# Patient Record
Sex: MALE | Race: BLACK OR AFRICAN AMERICAN | NOT HISPANIC OR LATINO | Employment: OTHER | URBAN - METROPOLITAN AREA
[De-identification: names, ages, dates, MRNs, and addresses within clinical notes are randomized per-mention and may not be internally consistent; named-entity substitution may affect disease eponyms.]

---

## 2019-11-12 ENCOUNTER — HOSPITAL ENCOUNTER (OUTPATIENT)
Dept: GENERAL RADIOLOGY | Facility: HOSPITAL | Age: 69
Discharge: HOME | End: 2019-11-12
Admitting: UROLOGY

## 2019-11-12 ENCOUNTER — APPOINTMENT (OUTPATIENT)
Dept: PREADMISSION TESTING | Facility: HOSPITAL | Age: 69
End: 2019-11-12

## 2019-11-12 VITALS
WEIGHT: 230.25 LBS | RESPIRATION RATE: 16 BRPM | SYSTOLIC BLOOD PRESSURE: 120 MMHG | TEMPERATURE: 97.7 F | OXYGEN SATURATION: 98 % | HEART RATE: 60 BPM | BODY MASS INDEX: 28.63 KG/M2 | HEIGHT: 75 IN | DIASTOLIC BLOOD PRESSURE: 73 MMHG

## 2019-11-12 LAB
ANION GAP SERPL CALCULATED.3IONS-SCNC: 11.8 MMOL/L (ref 5–15)
BUN BLD-MCNC: 16 MG/DL (ref 8–23)
BUN/CREAT SERPL: 9.4 (ref 7–25)
CALCIUM SPEC-SCNC: 9.6 MG/DL (ref 8.6–10.5)
CHLORIDE SERPL-SCNC: 97 MMOL/L (ref 98–107)
CO2 SERPL-SCNC: 24.2 MMOL/L (ref 22–29)
CREAT BLD-MCNC: 1.7 MG/DL (ref 0.76–1.27)
DEPRECATED RDW RBC AUTO: 42.1 FL (ref 37–54)
ERYTHROCYTE [DISTWIDTH] IN BLOOD BY AUTOMATED COUNT: 13 % (ref 12.3–15.4)
GFR SERPL CREATININE-BSD FRML MDRD: 40 ML/MIN/1.73
GLUCOSE BLD-MCNC: 190 MG/DL (ref 65–99)
HCT VFR BLD AUTO: 37.5 % (ref 37.5–51)
HGB BLD-MCNC: 12.8 G/DL (ref 13–17.7)
MCH RBC QN AUTO: 30.2 PG (ref 26.6–33)
MCHC RBC AUTO-ENTMCNC: 34.1 G/DL (ref 31.5–35.7)
MCV RBC AUTO: 88.4 FL (ref 79–97)
PLATELET # BLD AUTO: 209 10*3/MM3 (ref 140–450)
PMV BLD AUTO: 11.2 FL (ref 6–12)
POTASSIUM BLD-SCNC: 4 MMOL/L (ref 3.5–5.2)
RBC # BLD AUTO: 4.24 10*6/MM3 (ref 4.14–5.8)
SODIUM BLD-SCNC: 133 MMOL/L (ref 136–145)
WBC NRBC COR # BLD: 7.63 10*3/MM3 (ref 3.4–10.8)

## 2019-11-12 PROCEDURE — 93005 ELECTROCARDIOGRAM TRACING: CPT

## 2019-11-12 PROCEDURE — 93010 ELECTROCARDIOGRAM REPORT: CPT | Performed by: INTERNAL MEDICINE

## 2019-11-12 PROCEDURE — 80048 BASIC METABOLIC PNL TOTAL CA: CPT | Performed by: UROLOGY

## 2019-11-12 PROCEDURE — 36415 COLL VENOUS BLD VENIPUNCTURE: CPT

## 2019-11-12 PROCEDURE — 85027 COMPLETE CBC AUTOMATED: CPT | Performed by: UROLOGY

## 2019-11-12 PROCEDURE — 71046 X-RAY EXAM CHEST 2 VIEWS: CPT

## 2019-11-12 RX ORDER — TIZANIDINE 4 MG/1
4 TABLET ORAL NIGHTLY
COMMUNITY

## 2019-11-12 RX ORDER — AMLODIPINE BESYLATE 10 MG/1
10 TABLET ORAL EVERY MORNING
COMMUNITY

## 2019-11-12 RX ORDER — GLIPIZIDE 10 MG/1
10 TABLET ORAL
COMMUNITY
End: 2019-11-22 | Stop reason: HOSPADM

## 2019-11-12 RX ORDER — HYDROCODONE BITARTRATE AND ACETAMINOPHEN 10; 325 MG/1; MG/1
1 TABLET ORAL EVERY 4 HOURS PRN
COMMUNITY
End: 2023-03-31 | Stop reason: HOSPADM

## 2019-11-12 RX ORDER — TRAZODONE HYDROCHLORIDE 100 MG/1
100 TABLET ORAL NIGHTLY
COMMUNITY

## 2019-11-12 RX ORDER — INSULIN GLARGINE 100 [IU]/ML
30 INJECTION, SOLUTION SUBCUTANEOUS DAILY
COMMUNITY
End: 2019-11-22 | Stop reason: HOSPADM

## 2019-11-12 RX ORDER — OMEPRAZOLE 40 MG/1
40 CAPSULE, DELAYED RELEASE ORAL NIGHTLY
COMMUNITY

## 2019-11-12 RX ORDER — ALLOPURINOL 100 MG/1
100 TABLET ORAL EVERY EVENING
COMMUNITY

## 2019-11-12 RX ORDER — SIMVASTATIN 20 MG
20 TABLET ORAL
COMMUNITY
End: 2021-01-12

## 2019-11-12 RX ORDER — LOSARTAN POTASSIUM AND HYDROCHLOROTHIAZIDE 25; 100 MG/1; MG/1
1 TABLET ORAL DAILY
COMMUNITY
End: 2019-11-22 | Stop reason: HOSPADM

## 2019-11-12 RX ORDER — NEBIVOLOL 10 MG/1
10 TABLET ORAL EVERY MORNING
COMMUNITY

## 2019-11-12 NOTE — DISCHARGE INSTRUCTIONS
Take the following medications the morning of surgery with a small sip of water:    TAKE OMEPRAZOLE, AMLODIPINE, LOSARTAN, BYSTOLIC             MAY TAKE PAIN PILL IF NEEDED    General Instructions:  • Do not eat or drink anything after midnight the night before surgery. PER ORDER OF DOCTOR  • Patients who avoid  alcohol for 4 weeks prior to surgery have a reduced risk of post-operative complications.    • Do not  drink alcohol the day of surgery.   • Bring any papers given to you in the doctor’s office.  • Wear clean comfortable clothes.  • Remove all piercings.  Leave jewelry and any other valuables at home.  • The Pre-Admission Testing nurse will instruct you to bring medications if unable to obtain an accurate list in Pre-Admission Testing.   • REPORT TO MAIN SURGERY  ON 11-19-19 AT 1 PM           Preventing a Surgical Site Infection:  • For 2 to 3 days before surgery, avoid shaving with a razor because the razor can irritate skin and make it easier to develop an infection.    • Any areas of open skin can increase the risk of a post-operative wound infection by allowing bacteria to enter and travel throughout the body.  Notify your surgeon if you have any skin wounds / rashes even if it is not near the expected surgical site.  The area will need assessed to determine if surgery should be delayed until it is healed.  • The night prior to surgery sleep in a clean bed with clean clothing.  Do not allow pets to sleep with you.  • Shower on the morning of surgery using a fresh bar of anti-bacterial soap (such as Dial) and clean washcloth.  Dry with a clean towel and dress in clean clothing.  • Ask your surgeon if you will be receiving antibiotics prior to surgery.  • Make sure you, your family, and all healthcare providers clean their hands with soap and water or an alcohol based hand  before caring for you or your wound.    Day of surgery:  Your arrival time is approximately two hours before your scheduled  surgery time.  Upon arrival, a Pre-op nurse and Anesthesiologist will review your health history, obtain vital signs, and answer questions you may have.  The only belongings needed at this time will be your home medications and if applicable your C-PAP/BI-PAP machine.  If you are staying overnight your family can leave the rest of your belongings in the car and bring them to your room later.  A Pre-op nurse will start an IV and you may receive medication in preparation for surgery, including something to help you relax.  Your family will be able to see you in the Pre-op area.  Two visitors at a time will be allowed in the Pre-op room.  While you are in surgery your family should notify the waiting room  if they leave the waiting room area and provide a contact phone number.    Please be aware that surgery does come with discomfort.  We want to make every effort to control your discomfort so please discuss any uncontrolled symptoms with your nurse.   Your doctor will most likely have prescribed pain medications.          If you are staying overnight following surgery, you will be transported to your hospital room following the recovery period.  [facility] has all private rooms.    You have received a list of surgical assistants for your reference.  If you have any questions please call Pre-Admission Testing at [phone].  Deductibles and co-payments are collected on the day of service. Please be prepared to pay the required co-pay, deductible or deposit on the day of service as defined by your plan.

## 2019-11-19 ENCOUNTER — ANESTHESIA (OUTPATIENT)
Dept: PERIOP | Facility: HOSPITAL | Age: 69
End: 2019-11-19

## 2019-11-19 ENCOUNTER — HOSPITAL ENCOUNTER (INPATIENT)
Facility: HOSPITAL | Age: 69
LOS: 3 days | Discharge: HOME | End: 2019-11-22
Attending: UROLOGY | Admitting: UROLOGY

## 2019-11-19 ENCOUNTER — ANESTHESIA EVENT (OUTPATIENT)
Dept: PERIOP | Facility: HOSPITAL | Age: 69
End: 2019-11-19

## 2019-11-19 DIAGNOSIS — N28.89 LEFT RENAL MASS: Primary | ICD-10-CM

## 2019-11-19 LAB
ABO GROUP BLD: NORMAL
BLD GP AB SCN SERPL QL: NEGATIVE
GLUCOSE BLDC GLUCOMTR-MCNC: 107 MG/DL (ref 70–130)
GLUCOSE BLDC GLUCOMTR-MCNC: 129 MG/DL (ref 70–130)
RH BLD: POSITIVE
T&S EXPIRATION DATE: NORMAL

## 2019-11-19 PROCEDURE — 86901 BLOOD TYPING SEROLOGIC RH(D): CPT | Performed by: UROLOGY

## 2019-11-19 PROCEDURE — 25010000003 CEFAZOLIN IN DEXTROSE 2-4 GM/100ML-% SOLUTION: Performed by: UROLOGY

## 2019-11-19 PROCEDURE — 25010000002 FENTANYL CITRATE (PF) 100 MCG/2ML SOLUTION: Performed by: NURSE ANESTHETIST, CERTIFIED REGISTERED

## 2019-11-19 PROCEDURE — 88342 IMHCHEM/IMCYTCHM 1ST ANTB: CPT | Performed by: UROLOGY

## 2019-11-19 PROCEDURE — 25010000002 PROPOFOL 10 MG/ML EMULSION: Performed by: NURSE ANESTHETIST, CERTIFIED REGISTERED

## 2019-11-19 PROCEDURE — 86920 COMPATIBILITY TEST SPIN: CPT

## 2019-11-19 PROCEDURE — 25010000002 HYDROMORPHONE PER 4 MG: Performed by: NURSE ANESTHETIST, CERTIFIED REGISTERED

## 2019-11-19 PROCEDURE — 0TB10ZZ EXCISION OF LEFT KIDNEY, OPEN APPROACH: ICD-10-PCS | Performed by: UROLOGY

## 2019-11-19 PROCEDURE — 25010000002 MIDAZOLAM PER 1 MG: Performed by: ANESTHESIOLOGY

## 2019-11-19 PROCEDURE — 86900 BLOOD TYPING SEROLOGIC ABO: CPT | Performed by: UROLOGY

## 2019-11-19 PROCEDURE — 88341 IMHCHEM/IMCYTCHM EA ADD ANTB: CPT | Performed by: UROLOGY

## 2019-11-19 PROCEDURE — 82962 GLUCOSE BLOOD TEST: CPT

## 2019-11-19 PROCEDURE — 25010000002 FUROSEMIDE PER 20 MG: Performed by: NURSE ANESTHETIST, CERTIFIED REGISTERED

## 2019-11-19 PROCEDURE — 25010000002 ONDANSETRON PER 1 MG: Performed by: UROLOGY

## 2019-11-19 PROCEDURE — 25010000002 HYDROMORPHONE 1 MG/ML SOLUTION: Performed by: UROLOGY

## 2019-11-19 PROCEDURE — 25010000002 MANNITOL PER 50 ML: Performed by: NURSE ANESTHETIST, CERTIFIED REGISTERED

## 2019-11-19 PROCEDURE — 86850 RBC ANTIBODY SCREEN: CPT | Performed by: UROLOGY

## 2019-11-19 PROCEDURE — 25010000002 SUCCINYLCHOLINE PER 20 MG: Performed by: NURSE ANESTHETIST, CERTIFIED REGISTERED

## 2019-11-19 PROCEDURE — 88341 IMHCHEM/IMCYTCHM EA ADD ANTB: CPT

## 2019-11-19 PROCEDURE — 88307 TISSUE EXAM BY PATHOLOGIST: CPT | Performed by: UROLOGY

## 2019-11-19 DEVICE — CLIP LIG HEMOLOK PA LG 6CT PRP: Type: IMPLANTABLE DEVICE | Site: KIDNEY | Status: FUNCTIONAL

## 2019-11-19 DEVICE — CLIP LIGAT VASC HORIZON TI LG ORNG 6CT: Type: IMPLANTABLE DEVICE | Site: KIDNEY | Status: FUNCTIONAL

## 2019-11-19 DEVICE — CLIP LIGAT VASC HORIZON TI MD/LG GRN 6CT: Type: IMPLANTABLE DEVICE | Site: KIDNEY | Status: FUNCTIONAL

## 2019-11-19 RX ORDER — OXYCODONE AND ACETAMINOPHEN 7.5; 325 MG/1; MG/1
1 TABLET ORAL ONCE AS NEEDED
Status: COMPLETED | OUTPATIENT
Start: 2019-11-19 | End: 2019-11-19

## 2019-11-19 RX ORDER — MANNITOL 250 MG/ML
INJECTION, SOLUTION INTRAVENOUS AS NEEDED
Status: DISCONTINUED | OUTPATIENT
Start: 2019-11-19 | End: 2019-11-19 | Stop reason: SURG

## 2019-11-19 RX ORDER — HYDROCODONE BITARTRATE AND ACETAMINOPHEN 10; 325 MG/1; MG/1
1 TABLET ORAL EVERY 4 HOURS PRN
Status: DISCONTINUED | OUTPATIENT
Start: 2019-11-19 | End: 2019-11-21

## 2019-11-19 RX ORDER — CEFAZOLIN SODIUM 2 G/100ML
2 INJECTION, SOLUTION INTRAVENOUS
Status: COMPLETED | OUTPATIENT
Start: 2019-11-19 | End: 2019-11-19

## 2019-11-19 RX ORDER — DIPHENHYDRAMINE HCL 25 MG
25 CAPSULE ORAL
Status: DISCONTINUED | OUTPATIENT
Start: 2019-11-19 | End: 2019-11-19 | Stop reason: HOSPADM

## 2019-11-19 RX ORDER — ASPIRIN 81 MG/1
81 TABLET, CHEWABLE ORAL DAILY
COMMUNITY
End: 2019-11-22 | Stop reason: HOSPADM

## 2019-11-19 RX ORDER — ONDANSETRON 2 MG/ML
4 INJECTION INTRAMUSCULAR; INTRAVENOUS ONCE AS NEEDED
Status: DISCONTINUED | OUTPATIENT
Start: 2019-11-19 | End: 2019-11-19 | Stop reason: HOSPADM

## 2019-11-19 RX ORDER — SUCCINYLCHOLINE CHLORIDE 20 MG/ML
INJECTION INTRAMUSCULAR; INTRAVENOUS AS NEEDED
Status: DISCONTINUED | OUTPATIENT
Start: 2019-11-19 | End: 2019-11-19 | Stop reason: SURG

## 2019-11-19 RX ORDER — ALLOPURINOL 100 MG/1
100 TABLET ORAL NIGHTLY
Status: DISCONTINUED | OUTPATIENT
Start: 2019-11-19 | End: 2019-11-22 | Stop reason: HOSPADM

## 2019-11-19 RX ORDER — NEBIVOLOL 10 MG/1
10 TABLET ORAL DAILY
Status: DISCONTINUED | OUTPATIENT
Start: 2019-11-20 | End: 2019-11-22 | Stop reason: HOSPADM

## 2019-11-19 RX ORDER — SENNA AND DOCUSATE SODIUM 50; 8.6 MG/1; MG/1
2 TABLET, FILM COATED ORAL 2 TIMES DAILY
Status: DISCONTINUED | OUTPATIENT
Start: 2019-11-19 | End: 2019-11-22 | Stop reason: HOSPADM

## 2019-11-19 RX ORDER — CEFAZOLIN SODIUM 2 G/100ML
2 INJECTION, SOLUTION INTRAVENOUS EVERY 8 HOURS
Status: COMPLETED | OUTPATIENT
Start: 2019-11-19 | End: 2019-11-20

## 2019-11-19 RX ORDER — ONDANSETRON 4 MG/1
4 TABLET, FILM COATED ORAL EVERY 6 HOURS PRN
Status: DISCONTINUED | OUTPATIENT
Start: 2019-11-19 | End: 2019-11-22 | Stop reason: HOSPADM

## 2019-11-19 RX ORDER — LIDOCAINE HYDROCHLORIDE 10 MG/ML
0.5 INJECTION, SOLUTION EPIDURAL; INFILTRATION; INTRACAUDAL; PERINEURAL ONCE AS NEEDED
Status: DISCONTINUED | OUTPATIENT
Start: 2019-11-19 | End: 2019-11-19 | Stop reason: HOSPADM

## 2019-11-19 RX ORDER — HYDROMORPHONE HYDROCHLORIDE 1 MG/ML
0.5 INJECTION, SOLUTION INTRAMUSCULAR; INTRAVENOUS; SUBCUTANEOUS
Status: DISCONTINUED | OUTPATIENT
Start: 2019-11-19 | End: 2019-11-19 | Stop reason: HOSPADM

## 2019-11-19 RX ORDER — ACETAMINOPHEN 325 MG/1
650 TABLET ORAL ONCE AS NEEDED
Status: DISCONTINUED | OUTPATIENT
Start: 2019-11-19 | End: 2019-11-19 | Stop reason: HOSPADM

## 2019-11-19 RX ORDER — PANTOPRAZOLE SODIUM 40 MG/1
40 TABLET, DELAYED RELEASE ORAL
Status: DISCONTINUED | OUTPATIENT
Start: 2019-11-20 | End: 2019-11-22 | Stop reason: HOSPADM

## 2019-11-19 RX ORDER — SODIUM CHLORIDE 0.9 % (FLUSH) 0.9 %
3 SYRINGE (ML) INJECTION EVERY 12 HOURS SCHEDULED
Status: DISCONTINUED | OUTPATIENT
Start: 2019-11-19 | End: 2019-11-19 | Stop reason: HOSPADM

## 2019-11-19 RX ORDER — MIDAZOLAM HYDROCHLORIDE 1 MG/ML
0.5 INJECTION INTRAMUSCULAR; INTRAVENOUS
Status: DISCONTINUED | OUTPATIENT
Start: 2019-11-19 | End: 2019-11-19 | Stop reason: HOSPADM

## 2019-11-19 RX ORDER — NALOXONE HCL 0.4 MG/ML
0.1 VIAL (ML) INJECTION
Status: DISCONTINUED | OUTPATIENT
Start: 2019-11-19 | End: 2019-11-22 | Stop reason: HOSPADM

## 2019-11-19 RX ORDER — LIDOCAINE HYDROCHLORIDE 40 MG/ML
SOLUTION TOPICAL AS NEEDED
Status: DISCONTINUED | OUTPATIENT
Start: 2019-11-19 | End: 2019-11-19 | Stop reason: SURG

## 2019-11-19 RX ORDER — ATORVASTATIN CALCIUM 10 MG/1
10 TABLET, FILM COATED ORAL DAILY
Status: DISCONTINUED | OUTPATIENT
Start: 2019-11-20 | End: 2019-11-22 | Stop reason: HOSPADM

## 2019-11-19 RX ORDER — FAMOTIDINE 10 MG/ML
20 INJECTION, SOLUTION INTRAVENOUS ONCE
Status: COMPLETED | OUTPATIENT
Start: 2019-11-19 | End: 2019-11-19

## 2019-11-19 RX ORDER — HYDROCODONE BITARTRATE AND ACETAMINOPHEN 7.5; 325 MG/1; MG/1
1 TABLET ORAL EVERY 4 HOURS PRN
Status: DISCONTINUED | OUTPATIENT
Start: 2019-11-19 | End: 2019-11-22 | Stop reason: HOSPADM

## 2019-11-19 RX ORDER — PROPOFOL 10 MG/ML
VIAL (ML) INTRAVENOUS AS NEEDED
Status: DISCONTINUED | OUTPATIENT
Start: 2019-11-19 | End: 2019-11-19 | Stop reason: SURG

## 2019-11-19 RX ORDER — EPHEDRINE SULFATE 50 MG/ML
INJECTION, SOLUTION INTRAVENOUS AS NEEDED
Status: DISCONTINUED | OUTPATIENT
Start: 2019-11-19 | End: 2019-11-19 | Stop reason: SURG

## 2019-11-19 RX ORDER — HYDROCODONE BITARTRATE AND ACETAMINOPHEN 7.5; 325 MG/1; MG/1
1 TABLET ORAL ONCE AS NEEDED
Status: DISCONTINUED | OUTPATIENT
Start: 2019-11-19 | End: 2019-11-19 | Stop reason: HOSPADM

## 2019-11-19 RX ORDER — ONDANSETRON 2 MG/ML
4 INJECTION INTRAMUSCULAR; INTRAVENOUS EVERY 6 HOURS PRN
Status: DISCONTINUED | OUTPATIENT
Start: 2019-11-19 | End: 2019-11-22 | Stop reason: HOSPADM

## 2019-11-19 RX ORDER — PROMETHAZINE HYDROCHLORIDE 25 MG/1
25 TABLET ORAL ONCE AS NEEDED
Status: DISCONTINUED | OUTPATIENT
Start: 2019-11-19 | End: 2019-11-19 | Stop reason: HOSPADM

## 2019-11-19 RX ORDER — FUROSEMIDE 10 MG/ML
INJECTION INTRAMUSCULAR; INTRAVENOUS AS NEEDED
Status: DISCONTINUED | OUTPATIENT
Start: 2019-11-19 | End: 2019-11-19 | Stop reason: SURG

## 2019-11-19 RX ORDER — DIPHENHYDRAMINE HYDROCHLORIDE 50 MG/ML
12.5 INJECTION INTRAMUSCULAR; INTRAVENOUS
Status: DISCONTINUED | OUTPATIENT
Start: 2019-11-19 | End: 2019-11-19 | Stop reason: HOSPADM

## 2019-11-19 RX ORDER — EPHEDRINE SULFATE 50 MG/ML
5 INJECTION, SOLUTION INTRAVENOUS ONCE AS NEEDED
Status: DISCONTINUED | OUTPATIENT
Start: 2019-11-19 | End: 2019-11-19 | Stop reason: HOSPADM

## 2019-11-19 RX ORDER — NITROGLYCERIN 0.4 MG/1
0.4 TABLET SUBLINGUAL
Status: DISCONTINUED | OUTPATIENT
Start: 2019-11-19 | End: 2019-11-22 | Stop reason: HOSPADM

## 2019-11-19 RX ORDER — ROCURONIUM BROMIDE 10 MG/ML
INJECTION, SOLUTION INTRAVENOUS AS NEEDED
Status: DISCONTINUED | OUTPATIENT
Start: 2019-11-19 | End: 2019-11-19 | Stop reason: SURG

## 2019-11-19 RX ORDER — FLUMAZENIL 0.1 MG/ML
0.2 INJECTION INTRAVENOUS AS NEEDED
Status: DISCONTINUED | OUTPATIENT
Start: 2019-11-19 | End: 2019-11-19 | Stop reason: HOSPADM

## 2019-11-19 RX ORDER — LIDOCAINE HYDROCHLORIDE 20 MG/ML
INJECTION, SOLUTION INFILTRATION; PERINEURAL AS NEEDED
Status: DISCONTINUED | OUTPATIENT
Start: 2019-11-19 | End: 2019-11-19 | Stop reason: SURG

## 2019-11-19 RX ORDER — TIZANIDINE 4 MG/1
4 TABLET ORAL 2 TIMES DAILY
Status: DISCONTINUED | OUTPATIENT
Start: 2019-11-19 | End: 2019-11-22 | Stop reason: HOSPADM

## 2019-11-19 RX ORDER — FENTANYL CITRATE 50 UG/ML
50 INJECTION, SOLUTION INTRAMUSCULAR; INTRAVENOUS
Status: DISCONTINUED | OUTPATIENT
Start: 2019-11-19 | End: 2019-11-19 | Stop reason: HOSPADM

## 2019-11-19 RX ORDER — GLIPIZIDE 10 MG/1
10 TABLET ORAL
Status: DISCONTINUED | OUTPATIENT
Start: 2019-11-20 | End: 2019-11-20

## 2019-11-19 RX ORDER — PROMETHAZINE HYDROCHLORIDE 25 MG/1
25 SUPPOSITORY RECTAL ONCE AS NEEDED
Status: DISCONTINUED | OUTPATIENT
Start: 2019-11-19 | End: 2019-11-19 | Stop reason: HOSPADM

## 2019-11-19 RX ORDER — SODIUM CHLORIDE, SODIUM LACTATE, POTASSIUM CHLORIDE, CALCIUM CHLORIDE 600; 310; 30; 20 MG/100ML; MG/100ML; MG/100ML; MG/100ML
9 INJECTION, SOLUTION INTRAVENOUS CONTINUOUS
Status: DISCONTINUED | OUTPATIENT
Start: 2019-11-19 | End: 2019-11-20

## 2019-11-19 RX ORDER — FENTANYL CITRATE 50 UG/ML
INJECTION, SOLUTION INTRAMUSCULAR; INTRAVENOUS AS NEEDED
Status: DISCONTINUED | OUTPATIENT
Start: 2019-11-19 | End: 2019-11-19 | Stop reason: SURG

## 2019-11-19 RX ORDER — NALOXONE HCL 0.4 MG/ML
0.2 VIAL (ML) INJECTION AS NEEDED
Status: DISCONTINUED | OUTPATIENT
Start: 2019-11-19 | End: 2019-11-19 | Stop reason: HOSPADM

## 2019-11-19 RX ORDER — PROMETHAZINE HYDROCHLORIDE 25 MG/ML
12.5 INJECTION, SOLUTION INTRAMUSCULAR; INTRAVENOUS ONCE AS NEEDED
Status: DISCONTINUED | OUTPATIENT
Start: 2019-11-19 | End: 2019-11-19 | Stop reason: HOSPADM

## 2019-11-19 RX ORDER — SODIUM CHLORIDE 0.9 % (FLUSH) 0.9 %
3-10 SYRINGE (ML) INJECTION AS NEEDED
Status: DISCONTINUED | OUTPATIENT
Start: 2019-11-19 | End: 2019-11-19 | Stop reason: HOSPADM

## 2019-11-19 RX ORDER — SODIUM CHLORIDE 450 MG/100ML
100 INJECTION, SOLUTION INTRAVENOUS CONTINUOUS
Status: DISCONTINUED | OUTPATIENT
Start: 2019-11-19 | End: 2019-11-20

## 2019-11-19 RX ORDER — TRAZODONE HYDROCHLORIDE 100 MG/1
100 TABLET ORAL NIGHTLY
Status: DISCONTINUED | OUTPATIENT
Start: 2019-11-19 | End: 2019-11-22 | Stop reason: HOSPADM

## 2019-11-19 RX ORDER — HYDROMORPHONE HCL 110MG/55ML
PATIENT CONTROLLED ANALGESIA SYRINGE INTRAVENOUS AS NEEDED
Status: DISCONTINUED | OUTPATIENT
Start: 2019-11-19 | End: 2019-11-19 | Stop reason: SURG

## 2019-11-19 RX ORDER — CEFAZOLIN SODIUM 1 G/50ML
1 INJECTION, SOLUTION INTRAVENOUS
Status: DISCONTINUED | OUTPATIENT
Start: 2019-11-19 | End: 2019-11-19

## 2019-11-19 RX ORDER — AMLODIPINE BESYLATE 10 MG/1
10 TABLET ORAL DAILY
Status: DISCONTINUED | OUTPATIENT
Start: 2019-11-20 | End: 2019-11-22 | Stop reason: HOSPADM

## 2019-11-19 RX ORDER — HYDRALAZINE HYDROCHLORIDE 20 MG/ML
5 INJECTION INTRAMUSCULAR; INTRAVENOUS
Status: DISCONTINUED | OUTPATIENT
Start: 2019-11-19 | End: 2019-11-19 | Stop reason: HOSPADM

## 2019-11-19 RX ORDER — PROMETHAZINE HYDROCHLORIDE 25 MG/ML
6.25 INJECTION, SOLUTION INTRAMUSCULAR; INTRAVENOUS
Status: DISCONTINUED | OUTPATIENT
Start: 2019-11-19 | End: 2019-11-19 | Stop reason: HOSPADM

## 2019-11-19 RX ORDER — MAGNESIUM HYDROXIDE 1200 MG/15ML
LIQUID ORAL AS NEEDED
Status: DISCONTINUED | OUTPATIENT
Start: 2019-11-19 | End: 2019-11-19 | Stop reason: HOSPADM

## 2019-11-19 RX ADMIN — EPHEDRINE SULFATE 10 MG: 50 INJECTION INTRAMUSCULAR; INTRAVENOUS; SUBCUTANEOUS at 15:46

## 2019-11-19 RX ADMIN — HYDROMORPHONE HYDROCHLORIDE 0.5 MG: 1 INJECTION, SOLUTION INTRAMUSCULAR; INTRAVENOUS; SUBCUTANEOUS at 20:11

## 2019-11-19 RX ADMIN — TRAZODONE HYDROCHLORIDE 100 MG: 100 TABLET ORAL at 22:50

## 2019-11-19 RX ADMIN — CEFAZOLIN SODIUM 2 G: 2 INJECTION, SOLUTION INTRAVENOUS at 15:28

## 2019-11-19 RX ADMIN — METFORMIN HYDROCHLORIDE 1000 MG: 1000 TABLET ORAL at 22:50

## 2019-11-19 RX ADMIN — EPHEDRINE SULFATE 10 MG: 50 INJECTION INTRAMUSCULAR; INTRAVENOUS; SUBCUTANEOUS at 15:42

## 2019-11-19 RX ADMIN — MIDAZOLAM 0.5 MG: 1 INJECTION INTRAMUSCULAR; INTRAVENOUS at 13:55

## 2019-11-19 RX ADMIN — LIDOCAINE HYDROCHLORIDE 1 EACH: 40 SOLUTION TOPICAL at 15:26

## 2019-11-19 RX ADMIN — SENNOSIDES AND DOCUSATE SODIUM 2 TABLET: 8.6; 5 TABLET ORAL at 22:50

## 2019-11-19 RX ADMIN — ROCURONIUM BROMIDE 40 MG: 10 INJECTION INTRAVENOUS at 15:30

## 2019-11-19 RX ADMIN — SUCCINYLCHOLINE CHLORIDE 140 MG: 20 INJECTION, SOLUTION INTRAMUSCULAR; INTRAVENOUS; PARENTERAL at 15:24

## 2019-11-19 RX ADMIN — FENTANYL CITRATE 100 MCG: 50 INJECTION, SOLUTION INTRAMUSCULAR; INTRAVENOUS at 15:24

## 2019-11-19 RX ADMIN — FENTANYL CITRATE 50 MCG: 50 INJECTION, SOLUTION INTRAMUSCULAR; INTRAVENOUS at 18:49

## 2019-11-19 RX ADMIN — TIZANIDINE 4 MG: 4 TABLET ORAL at 22:50

## 2019-11-19 RX ADMIN — ROCURONIUM BROMIDE 10 MG: 10 INJECTION INTRAVENOUS at 16:55

## 2019-11-19 RX ADMIN — HYDROMORPHONE HYDROCHLORIDE 0.5 MG: 1 INJECTION, SOLUTION INTRAMUSCULAR; INTRAVENOUS; SUBCUTANEOUS at 18:39

## 2019-11-19 RX ADMIN — MANNITOL 12.5 G: 12.5 INJECTION, SOLUTION INTRAVENOUS at 17:07

## 2019-11-19 RX ADMIN — HYDROMORPHONE HYDROCHLORIDE 1 MG: 10 INJECTION INTRAMUSCULAR; INTRAVENOUS; SUBCUTANEOUS at 21:51

## 2019-11-19 RX ADMIN — SODIUM CHLORIDE 125 ML/HR: 4.5 INJECTION, SOLUTION INTRAVENOUS at 21:02

## 2019-11-19 RX ADMIN — SODIUM CHLORIDE, POTASSIUM CHLORIDE, SODIUM LACTATE AND CALCIUM CHLORIDE 9 ML/HR: 600; 310; 30; 20 INJECTION, SOLUTION INTRAVENOUS at 13:55

## 2019-11-19 RX ADMIN — FUROSEMIDE 20 MG: 10 INJECTION, SOLUTION INTRAMUSCULAR; INTRAVENOUS at 17:07

## 2019-11-19 RX ADMIN — ROCURONIUM BROMIDE 20 MG: 10 INJECTION INTRAVENOUS at 16:00

## 2019-11-19 RX ADMIN — FAMOTIDINE 20 MG: 10 INJECTION INTRAVENOUS at 13:55

## 2019-11-19 RX ADMIN — SODIUM CHLORIDE, POTASSIUM CHLORIDE, SODIUM LACTATE AND CALCIUM CHLORIDE: 600; 310; 30; 20 INJECTION, SOLUTION INTRAVENOUS at 17:10

## 2019-11-19 RX ADMIN — CEFAZOLIN SODIUM 2 G: 2 INJECTION, SOLUTION INTRAVENOUS at 22:51

## 2019-11-19 RX ADMIN — FENTANYL CITRATE 50 MCG: 50 INJECTION, SOLUTION INTRAMUSCULAR; INTRAVENOUS at 16:09

## 2019-11-19 RX ADMIN — OXYCODONE HYDROCHLORIDE AND ACETAMINOPHEN 1 TABLET: 7.5; 325 TABLET ORAL at 19:15

## 2019-11-19 RX ADMIN — ONDANSETRON 4 MG: 2 INJECTION INTRAMUSCULAR; INTRAVENOUS at 21:58

## 2019-11-19 RX ADMIN — FENTANYL CITRATE 50 MCG: 50 INJECTION, SOLUTION INTRAMUSCULAR; INTRAVENOUS at 18:34

## 2019-11-19 RX ADMIN — SUGAMMADEX 400 MG: 100 INJECTION, SOLUTION INTRAVENOUS at 18:12

## 2019-11-19 RX ADMIN — FENTANYL CITRATE 50 MCG: 50 INJECTION, SOLUTION INTRAMUSCULAR; INTRAVENOUS at 16:00

## 2019-11-19 RX ADMIN — EPHEDRINE SULFATE 5 MG: 50 INJECTION INTRAMUSCULAR; INTRAVENOUS; SUBCUTANEOUS at 17:28

## 2019-11-19 RX ADMIN — FENTANYL CITRATE 50 MCG: 50 INJECTION, SOLUTION INTRAMUSCULAR; INTRAVENOUS at 16:04

## 2019-11-19 RX ADMIN — HYDROMORPHONE HYDROCHLORIDE 1 MG: 2 INJECTION INTRAMUSCULAR; INTRAVENOUS; SUBCUTANEOUS at 17:31

## 2019-11-19 RX ADMIN — HYDROMORPHONE HYDROCHLORIDE 0.5 MG: 1 INJECTION, SOLUTION INTRAMUSCULAR; INTRAVENOUS; SUBCUTANEOUS at 19:00

## 2019-11-19 RX ADMIN — HYDROCODONE BITARTRATE AND ACETAMINOPHEN 1 TABLET: 7.5; 325 TABLET ORAL at 22:54

## 2019-11-19 RX ADMIN — ROCURONIUM BROMIDE 10 MG: 10 INJECTION INTRAVENOUS at 15:24

## 2019-11-19 RX ADMIN — ALLOPURINOL 100 MG: 100 TABLET ORAL at 22:50

## 2019-11-19 RX ADMIN — PROPOFOL 200 MG: 10 INJECTION, EMULSION INTRAVENOUS at 15:24

## 2019-11-19 RX ADMIN — LIDOCAINE HYDROCHLORIDE 100 MG: 20 INJECTION, SOLUTION INFILTRATION; PERINEURAL at 15:24

## 2019-11-19 NOTE — ANESTHESIA PREPROCEDURE EVALUATION
Anesthesia Evaluation     Patient summary reviewed and Nursing notes reviewed   no history of anesthetic complications:  NPO Solid Status: > 8 hours  NPO Liquid Status: > 8 hours           Airway   Mallampati: II  TM distance: >3 FB  Neck ROM: full  No difficulty expected  Comment: Full beard  Dental - normal exam     Pulmonary - normal exam    breath sounds clear to auscultation  (+) a smoker Former,   Cardiovascular - normal exam    ECG reviewed  Rhythm: regular  Rate: normal    (+) hypertension 2 medications or greater, hyperlipidemia,       Neuro/Psych    ROS Comment: Diabetic neuropathy  GI/Hepatic/Renal/Endo    (+)  GERD,  renal disease (Lrenal mass), diabetes mellitus (BSG = 107) type 2,     Musculoskeletal         ROS comment: gout  Abdominal  - normal exam   Substance History - negative use     OB/GYN negative ob/gyn ROS         Other - negative ROS                       Anesthesia Plan    ASA 3     general     intravenous induction     Anesthetic plan, all risks, benefits, and alternatives have been provided, discussed and informed consent has been obtained with: patient.

## 2019-11-19 NOTE — ANESTHESIA PROCEDURE NOTES
Airway  Urgency: elective    Date/Time: 11/19/2019 3:26 PM  Airway not difficult    General Information and Staff    Patient location during procedure: OR  Anesthesiologist: Tres Willett MD  CRNA: Franny Tabares CRNA    Indications and Patient Condition  Indications for airway management: airway protection    Preoxygenated: yes  MILS not maintained throughout  Mask difficulty assessment: 1 - vent by mask    Final Airway Details  Final airway type: endotracheal airway      Successful airway: ETT  Cuffed: yes   Successful intubation technique: direct laryngoscopy  Facilitating devices/methods: cricoid pressure  Endotracheal tube insertion site: oral  Blade: Lindsey  ETT size (mm): 7.5  Cormack-Lehane Classification: grade IIb - view of arytenoids or posterior of glottis only  Placement verified by: chest auscultation and capnometry   Measured from: lips  ETT/EBT  to lips (cm): 22  Number of attempts at approach: 1  Assessment: lips, teeth, and gum same as pre-op and atraumatic intubation    Additional Comments  Dentition intact and unchanged. CBEBS.  +ETCO2.

## 2019-11-20 PROBLEM — I10 HYPERTENSION: Status: ACTIVE | Noted: 2019-11-20

## 2019-11-20 PROBLEM — E11.40 DIABETIC NEUROPATHY (HCC): Status: ACTIVE | Noted: 2019-11-20

## 2019-11-20 PROBLEM — Z78.9 ALCOHOL USE: Status: ACTIVE | Noted: 2019-11-20

## 2019-11-20 PROBLEM — E11.49 TYPE 2 DIABETES MELLITUS WITH NEUROLOGIC COMPLICATION, WITH LONG-TERM CURRENT USE OF INSULIN: Status: ACTIVE | Noted: 2019-11-20

## 2019-11-20 PROBLEM — N18.30 CKD (CHRONIC KIDNEY DISEASE) STAGE 3, GFR 30-59 ML/MIN: Status: ACTIVE | Noted: 2019-11-20

## 2019-11-20 PROBLEM — N17.9 AKI (ACUTE KIDNEY INJURY) (HCC): Status: ACTIVE | Noted: 2019-11-20

## 2019-11-20 PROBLEM — Z79.4 TYPE 2 DIABETES MELLITUS WITH NEUROLOGIC COMPLICATION, WITH LONG-TERM CURRENT USE OF INSULIN: Status: ACTIVE | Noted: 2019-11-20

## 2019-11-20 LAB
ANION GAP SERPL CALCULATED.3IONS-SCNC: 11.4 MMOL/L (ref 5–15)
BASOPHILS # BLD AUTO: 0.05 10*3/MM3 (ref 0–0.2)
BASOPHILS NFR BLD AUTO: 0.6 % (ref 0–1.5)
BUN BLD-MCNC: 15 MG/DL (ref 8–23)
BUN/CREAT SERPL: 7.4 (ref 7–25)
CALCIUM SPEC-SCNC: 8.8 MG/DL (ref 8.6–10.5)
CHLORIDE SERPL-SCNC: 97 MMOL/L (ref 98–107)
CO2 SERPL-SCNC: 27.6 MMOL/L (ref 22–29)
CREAT BLD-MCNC: 2.04 MG/DL (ref 0.76–1.27)
CREAT FLD-MCNC: 2 MG/DL
DEPRECATED RDW RBC AUTO: 40.8 FL (ref 37–54)
EOSINOPHIL # BLD AUTO: 0.08 10*3/MM3 (ref 0–0.4)
EOSINOPHIL NFR BLD AUTO: 0.9 % (ref 0.3–6.2)
ERYTHROCYTE [DISTWIDTH] IN BLOOD BY AUTOMATED COUNT: 12.7 % (ref 12.3–15.4)
GFR SERPL CREATININE-BSD FRML MDRD: 32 ML/MIN/1.73
GLUCOSE BLD-MCNC: 129 MG/DL (ref 65–99)
GLUCOSE BLDC GLUCOMTR-MCNC: 114 MG/DL (ref 70–130)
GLUCOSE BLDC GLUCOMTR-MCNC: 128 MG/DL (ref 70–130)
GLUCOSE BLDC GLUCOMTR-MCNC: 148 MG/DL (ref 70–130)
HBA1C MFR BLD: 5.6 % (ref 4.8–5.6)
HCT VFR BLD AUTO: 37.7 % (ref 37.5–51)
HGB BLD-MCNC: 13 G/DL (ref 13–17.7)
IMM GRANULOCYTES # BLD AUTO: 0.04 10*3/MM3 (ref 0–0.05)
IMM GRANULOCYTES NFR BLD AUTO: 0.4 % (ref 0–0.5)
LYMPHOCYTES # BLD AUTO: 1.34 10*3/MM3 (ref 0.7–3.1)
LYMPHOCYTES NFR BLD AUTO: 15.1 % (ref 19.6–45.3)
MCH RBC QN AUTO: 30.4 PG (ref 26.6–33)
MCHC RBC AUTO-ENTMCNC: 34.5 G/DL (ref 31.5–35.7)
MCV RBC AUTO: 88.1 FL (ref 79–97)
MONOCYTES # BLD AUTO: 0.97 10*3/MM3 (ref 0.1–0.9)
MONOCYTES NFR BLD AUTO: 10.9 % (ref 5–12)
NEUTROPHILS # BLD AUTO: 6.41 10*3/MM3 (ref 1.7–7)
NEUTROPHILS NFR BLD AUTO: 72.1 % (ref 42.7–76)
NRBC BLD AUTO-RTO: 0 /100 WBC (ref 0–0.2)
PLATELET # BLD AUTO: 245 10*3/MM3 (ref 140–450)
PMV BLD AUTO: 11.3 FL (ref 6–12)
POTASSIUM BLD-SCNC: 4.8 MMOL/L (ref 3.5–5.2)
RBC # BLD AUTO: 4.28 10*6/MM3 (ref 4.14–5.8)
SODIUM BLD-SCNC: 136 MMOL/L (ref 136–145)
WBC NRBC COR # BLD: 8.89 10*3/MM3 (ref 3.4–10.8)

## 2019-11-20 PROCEDURE — 25010000002 HYDROMORPHONE 1 MG/ML SOLUTION: Performed by: UROLOGY

## 2019-11-20 PROCEDURE — 85025 COMPLETE CBC W/AUTO DIFF WBC: CPT | Performed by: UROLOGY

## 2019-11-20 PROCEDURE — 82570 ASSAY OF URINE CREATININE: CPT | Performed by: UROLOGY

## 2019-11-20 PROCEDURE — 25010000003 CEFAZOLIN IN DEXTROSE 2-4 GM/100ML-% SOLUTION: Performed by: UROLOGY

## 2019-11-20 PROCEDURE — 86900 BLOOD TYPING SEROLOGIC ABO: CPT

## 2019-11-20 PROCEDURE — 86901 BLOOD TYPING SEROLOGIC RH(D): CPT

## 2019-11-20 PROCEDURE — 83036 HEMOGLOBIN GLYCOSYLATED A1C: CPT | Performed by: NURSE PRACTITIONER

## 2019-11-20 PROCEDURE — 80048 BASIC METABOLIC PNL TOTAL CA: CPT | Performed by: UROLOGY

## 2019-11-20 PROCEDURE — 82962 GLUCOSE BLOOD TEST: CPT

## 2019-11-20 RX ORDER — NICOTINE POLACRILEX 4 MG
15 LOZENGE BUCCAL
Status: DISCONTINUED | OUTPATIENT
Start: 2019-11-20 | End: 2019-11-22 | Stop reason: HOSPADM

## 2019-11-20 RX ORDER — INSULIN GLARGINE 100 [IU]/ML
15 INJECTION, SOLUTION SUBCUTANEOUS NIGHTLY
Status: DISCONTINUED | OUTPATIENT
Start: 2019-11-20 | End: 2019-11-21

## 2019-11-20 RX ORDER — SODIUM CHLORIDE 9 MG/ML
75 INJECTION, SOLUTION INTRAVENOUS CONTINUOUS
Status: DISCONTINUED | OUTPATIENT
Start: 2019-11-20 | End: 2019-11-21

## 2019-11-20 RX ORDER — DEXTROSE MONOHYDRATE 25 G/50ML
25 INJECTION, SOLUTION INTRAVENOUS
Status: DISCONTINUED | OUTPATIENT
Start: 2019-11-20 | End: 2019-11-22 | Stop reason: HOSPADM

## 2019-11-20 RX ADMIN — HYDROMORPHONE HYDROCHLORIDE 1 MG: 10 INJECTION INTRAMUSCULAR; INTRAVENOUS; SUBCUTANEOUS at 05:56

## 2019-11-20 RX ADMIN — HYDROCODONE BITARTRATE AND ACETAMINOPHEN 1 TABLET: 7.5; 325 TABLET ORAL at 02:58

## 2019-11-20 RX ADMIN — GLIPIZIDE 10 MG: 10 TABLET ORAL at 08:49

## 2019-11-20 RX ADMIN — NEBIVOLOL HYDROCHLORIDE 10 MG: 10 TABLET ORAL at 08:49

## 2019-11-20 RX ADMIN — HYDROMORPHONE HYDROCHLORIDE 1 MG: 10 INJECTION INTRAMUSCULAR; INTRAVENOUS; SUBCUTANEOUS at 18:17

## 2019-11-20 RX ADMIN — HYDROCODONE BITARTRATE AND ACETAMINOPHEN 1 TABLET: 10; 325 TABLET ORAL at 21:30

## 2019-11-20 RX ADMIN — HYDROMORPHONE HYDROCHLORIDE 1 MG: 10 INJECTION INTRAMUSCULAR; INTRAVENOUS; SUBCUTANEOUS at 10:39

## 2019-11-20 RX ADMIN — HYDROCODONE BITARTRATE AND ACETAMINOPHEN 1 TABLET: 7.5; 325 TABLET ORAL at 08:49

## 2019-11-20 RX ADMIN — TRAZODONE HYDROCHLORIDE 100 MG: 100 TABLET ORAL at 21:30

## 2019-11-20 RX ADMIN — TIZANIDINE 4 MG: 4 TABLET ORAL at 08:49

## 2019-11-20 RX ADMIN — SODIUM CHLORIDE 125 ML/HR: 4.5 INJECTION, SOLUTION INTRAVENOUS at 04:45

## 2019-11-20 RX ADMIN — METFORMIN HYDROCHLORIDE 1000 MG: 1000 TABLET ORAL at 08:49

## 2019-11-20 RX ADMIN — SODIUM CHLORIDE 75 ML/HR: 9 INJECTION, SOLUTION INTRAVENOUS at 12:20

## 2019-11-20 RX ADMIN — SENNOSIDES AND DOCUSATE SODIUM 2 TABLET: 8.6; 5 TABLET ORAL at 21:30

## 2019-11-20 RX ADMIN — HYDROCODONE BITARTRATE AND ACETAMINOPHEN 1 TABLET: 10; 325 TABLET ORAL at 14:54

## 2019-11-20 RX ADMIN — AMLODIPINE BESYLATE 10 MG: 10 TABLET ORAL at 08:48

## 2019-11-20 RX ADMIN — CEFAZOLIN SODIUM 2 G: 2 INJECTION, SOLUTION INTRAVENOUS at 06:18

## 2019-11-20 RX ADMIN — ATORVASTATIN CALCIUM 10 MG: 10 TABLET, FILM COATED ORAL at 08:48

## 2019-11-20 RX ADMIN — PANTOPRAZOLE SODIUM 40 MG: 40 TABLET, DELAYED RELEASE ORAL at 06:18

## 2019-11-20 RX ADMIN — ALLOPURINOL 100 MG: 100 TABLET ORAL at 21:30

## 2019-11-20 RX ADMIN — TIZANIDINE 4 MG: 4 TABLET ORAL at 21:30

## 2019-11-20 RX ADMIN — SENNOSIDES AND DOCUSATE SODIUM 2 TABLET: 8.6; 5 TABLET ORAL at 08:49

## 2019-11-20 RX ADMIN — SODIUM CHLORIDE 75 ML/HR: 9 INJECTION, SOLUTION INTRAVENOUS at 23:54

## 2019-11-20 NOTE — ANESTHESIA POSTPROCEDURE EVALUATION
"Patient: Tomy Manjarrez    Procedure Summary     Date:  11/19/19 Room / Location:  [facility] OR  / [facility] MAIN OR    Anesthesia Start:  1520 Anesthesia Stop:  1831    Procedure:  LEFT PARTIAL NEPHRECTOMY X2, INTRAOPERATIVE RENAL ULTRASONOGRAPHY (Left ) Diagnosis:      Surgeon:  Tres Mena MD Provider:  Tres Willett MD    Anesthesia Type:  general ASA Status:  3          Anesthesia Type: general  Last vitals  BP   137/65 (11/19/19 1915)   Temp   36.6 °C (97.8 °F) (11/19/19 1827)   Pulse   70 (11/19/19 1915)   Resp   18 (11/19/19 1915)     SpO2   96 % (11/19/19 1915)     Post Anesthesia Care and Evaluation    Patient location during evaluation: bedside  Patient participation: complete - patient participated  Level of consciousness: awake and alert  Pain management: adequate  Airway patency: patent  Anesthetic complications: No anesthetic complications    Cardiovascular status: acceptable  Respiratory status: acceptable  Hydration status: acceptable    Comments: /65   Pulse 70   Temp 36.6 °C (97.8 °F) (Oral)   Resp 18   Ht 190.5 cm (75\")   Wt 99.7 kg (219 lb 12.8 oz)   SpO2 96%   BMI 27.47 kg/m²       "

## 2019-11-21 LAB
ALBUMIN SERPL-MCNC: 3.3 G/DL (ref 3.5–5.2)
ANION GAP SERPL CALCULATED.3IONS-SCNC: 13.4 MMOL/L (ref 5–15)
BASOPHILS # BLD AUTO: 0.04 10*3/MM3 (ref 0–0.2)
BASOPHILS NFR BLD AUTO: 0.4 % (ref 0–1.5)
BUN BLD-MCNC: 20 MG/DL (ref 8–23)
BUN/CREAT SERPL: 9.3 (ref 7–25)
CALCIUM SPEC-SCNC: 8.4 MG/DL (ref 8.6–10.5)
CHLORIDE SERPL-SCNC: 97 MMOL/L (ref 98–107)
CO2 SERPL-SCNC: 24.6 MMOL/L (ref 22–29)
CREAT BLD-MCNC: 2.14 MG/DL (ref 0.76–1.27)
DEPRECATED RDW RBC AUTO: 40.1 FL (ref 37–54)
EOSINOPHIL # BLD AUTO: 0.12 10*3/MM3 (ref 0–0.4)
EOSINOPHIL NFR BLD AUTO: 1.1 % (ref 0.3–6.2)
ERYTHROCYTE [DISTWIDTH] IN BLOOD BY AUTOMATED COUNT: 12.6 % (ref 12.3–15.4)
GFR SERPL CREATININE-BSD FRML MDRD: 31 ML/MIN/1.73
GLUCOSE BLD-MCNC: 99 MG/DL (ref 65–99)
GLUCOSE BLDC GLUCOMTR-MCNC: 109 MG/DL (ref 70–130)
GLUCOSE BLDC GLUCOMTR-MCNC: 112 MG/DL (ref 70–130)
GLUCOSE BLDC GLUCOMTR-MCNC: 123 MG/DL (ref 70–130)
GLUCOSE BLDC GLUCOMTR-MCNC: 150 MG/DL (ref 70–130)
HCT VFR BLD AUTO: 32.9 % (ref 37.5–51)
HGB BLD-MCNC: 11.6 G/DL (ref 13–17.7)
IMM GRANULOCYTES # BLD AUTO: 0.06 10*3/MM3 (ref 0–0.05)
IMM GRANULOCYTES NFR BLD AUTO: 0.5 % (ref 0–0.5)
LYMPHOCYTES # BLD AUTO: 1.27 10*3/MM3 (ref 0.7–3.1)
LYMPHOCYTES NFR BLD AUTO: 11.6 % (ref 19.6–45.3)
MAGNESIUM SERPL-MCNC: 1.6 MG/DL (ref 1.6–2.4)
MCH RBC QN AUTO: 30.9 PG (ref 26.6–33)
MCHC RBC AUTO-ENTMCNC: 35.3 G/DL (ref 31.5–35.7)
MCV RBC AUTO: 87.5 FL (ref 79–97)
MONOCYTES # BLD AUTO: 1.35 10*3/MM3 (ref 0.1–0.9)
MONOCYTES NFR BLD AUTO: 12.3 % (ref 5–12)
NEUTROPHILS # BLD AUTO: 8.12 10*3/MM3 (ref 1.7–7)
NEUTROPHILS NFR BLD AUTO: 74.1 % (ref 42.7–76)
NRBC BLD AUTO-RTO: 0 /100 WBC (ref 0–0.2)
PHOSPHATE SERPL-MCNC: 3.8 MG/DL (ref 2.5–4.5)
PLATELET # BLD AUTO: 204 10*3/MM3 (ref 140–450)
PMV BLD AUTO: 11 FL (ref 6–12)
POTASSIUM BLD-SCNC: 4.5 MMOL/L (ref 3.5–5.2)
RBC # BLD AUTO: 3.76 10*6/MM3 (ref 4.14–5.8)
SODIUM BLD-SCNC: 135 MMOL/L (ref 136–145)
WBC NRBC COR # BLD: 10.96 10*3/MM3 (ref 3.4–10.8)

## 2019-11-21 PROCEDURE — 97162 PT EVAL MOD COMPLEX 30 MIN: CPT

## 2019-11-21 PROCEDURE — 97110 THERAPEUTIC EXERCISES: CPT

## 2019-11-21 PROCEDURE — 80069 RENAL FUNCTION PANEL: CPT | Performed by: HOSPITALIST

## 2019-11-21 PROCEDURE — 25010000002 HYDROMORPHONE 1 MG/ML SOLUTION: Performed by: UROLOGY

## 2019-11-21 PROCEDURE — 94799 UNLISTED PULMONARY SVC/PX: CPT

## 2019-11-21 PROCEDURE — 85025 COMPLETE CBC W/AUTO DIFF WBC: CPT | Performed by: UROLOGY

## 2019-11-21 PROCEDURE — 63710000001 INSULIN LISPRO (HUMAN) PER 5 UNITS: Performed by: NURSE PRACTITIONER

## 2019-11-21 PROCEDURE — 82962 GLUCOSE BLOOD TEST: CPT

## 2019-11-21 PROCEDURE — 83735 ASSAY OF MAGNESIUM: CPT | Performed by: HOSPITALIST

## 2019-11-21 RX ORDER — TAMSULOSIN HYDROCHLORIDE 0.4 MG/1
0.4 CAPSULE ORAL DAILY
Status: DISCONTINUED | OUTPATIENT
Start: 2019-11-21 | End: 2019-11-22 | Stop reason: HOSPADM

## 2019-11-21 RX ORDER — HYDROCODONE BITARTRATE AND ACETAMINOPHEN 10; 325 MG/1; MG/1
1 TABLET ORAL EVERY 4 HOURS PRN
Status: DISCONTINUED | OUTPATIENT
Start: 2019-11-21 | End: 2019-11-22 | Stop reason: HOSPADM

## 2019-11-21 RX ORDER — SODIUM CHLORIDE 450 MG/100ML
100 INJECTION, SOLUTION INTRAVENOUS CONTINUOUS
Status: DISCONTINUED | OUTPATIENT
Start: 2019-11-21 | End: 2019-11-22 | Stop reason: HOSPADM

## 2019-11-21 RX ADMIN — HYDROMORPHONE HYDROCHLORIDE 1 MG: 10 INJECTION INTRAMUSCULAR; INTRAVENOUS; SUBCUTANEOUS at 11:23

## 2019-11-21 RX ADMIN — TIZANIDINE 4 MG: 4 TABLET ORAL at 08:43

## 2019-11-21 RX ADMIN — HYDROCODONE BITARTRATE AND ACETAMINOPHEN 1 TABLET: 10; 325 TABLET ORAL at 16:24

## 2019-11-21 RX ADMIN — TAMSULOSIN HYDROCHLORIDE 0.4 MG: 0.4 CAPSULE ORAL at 08:44

## 2019-11-21 RX ADMIN — PANTOPRAZOLE SODIUM 40 MG: 40 TABLET, DELAYED RELEASE ORAL at 05:19

## 2019-11-21 RX ADMIN — SENNOSIDES AND DOCUSATE SODIUM 2 TABLET: 8.6; 5 TABLET ORAL at 20:46

## 2019-11-21 RX ADMIN — SODIUM CHLORIDE 100 ML/HR: 4.5 INJECTION, SOLUTION INTRAVENOUS at 07:05

## 2019-11-21 RX ADMIN — HYDROCODONE BITARTRATE AND ACETAMINOPHEN 1 TABLET: 10; 325 TABLET ORAL at 08:44

## 2019-11-21 RX ADMIN — HYDROCODONE BITARTRATE AND ACETAMINOPHEN 1 TABLET: 10; 325 TABLET ORAL at 20:46

## 2019-11-21 RX ADMIN — INSULIN LISPRO 2 UNITS: 100 INJECTION, SOLUTION INTRAVENOUS; SUBCUTANEOUS at 12:50

## 2019-11-21 RX ADMIN — ALLOPURINOL 100 MG: 100 TABLET ORAL at 20:46

## 2019-11-21 RX ADMIN — TRAZODONE HYDROCHLORIDE 100 MG: 100 TABLET ORAL at 20:46

## 2019-11-21 RX ADMIN — TIZANIDINE 4 MG: 4 TABLET ORAL at 20:46

## 2019-11-21 RX ADMIN — SENNOSIDES AND DOCUSATE SODIUM 2 TABLET: 8.6; 5 TABLET ORAL at 08:44

## 2019-11-21 RX ADMIN — AMLODIPINE BESYLATE 10 MG: 10 TABLET ORAL at 08:44

## 2019-11-21 RX ADMIN — ATORVASTATIN CALCIUM 10 MG: 10 TABLET, FILM COATED ORAL at 08:43

## 2019-11-21 RX ADMIN — SODIUM CHLORIDE 100 ML/HR: 4.5 INJECTION, SOLUTION INTRAVENOUS at 17:12

## 2019-11-21 RX ADMIN — NEBIVOLOL HYDROCHLORIDE 10 MG: 10 TABLET ORAL at 08:43

## 2019-11-22 VITALS
BODY MASS INDEX: 27.33 KG/M2 | TEMPERATURE: 97.8 F | HEIGHT: 75 IN | SYSTOLIC BLOOD PRESSURE: 121 MMHG | RESPIRATION RATE: 18 BRPM | OXYGEN SATURATION: 95 % | HEART RATE: 67 BPM | DIASTOLIC BLOOD PRESSURE: 59 MMHG | WEIGHT: 219.8 LBS

## 2019-11-22 LAB
ANION GAP SERPL CALCULATED.3IONS-SCNC: 12.3 MMOL/L (ref 5–15)
BASOPHILS # BLD AUTO: 0.06 10*3/MM3 (ref 0–0.2)
BASOPHILS NFR BLD AUTO: 0.7 % (ref 0–1.5)
BUN BLD-MCNC: 26 MG/DL (ref 8–23)
BUN/CREAT SERPL: 13.3 (ref 7–25)
CALCIUM SPEC-SCNC: 9 MG/DL (ref 8.6–10.5)
CHLORIDE SERPL-SCNC: 98 MMOL/L (ref 98–107)
CO2 SERPL-SCNC: 23.7 MMOL/L (ref 22–29)
CREAT BLD-MCNC: 1.95 MG/DL (ref 0.76–1.27)
DEPRECATED RDW RBC AUTO: 38.1 FL (ref 37–54)
EOSINOPHIL # BLD AUTO: 0.21 10*3/MM3 (ref 0–0.4)
EOSINOPHIL NFR BLD AUTO: 2.3 % (ref 0.3–6.2)
ERYTHROCYTE [DISTWIDTH] IN BLOOD BY AUTOMATED COUNT: 12.4 % (ref 12.3–15.4)
GFR SERPL CREATININE-BSD FRML MDRD: 34 ML/MIN/1.73
GLUCOSE BLD-MCNC: 104 MG/DL (ref 65–99)
GLUCOSE BLDC GLUCOMTR-MCNC: 115 MG/DL (ref 70–130)
GLUCOSE BLDC GLUCOMTR-MCNC: 154 MG/DL (ref 70–130)
HCT VFR BLD AUTO: 31.5 % (ref 37.5–51)
HGB BLD-MCNC: 11.4 G/DL (ref 13–17.7)
IMM GRANULOCYTES # BLD AUTO: 0.04 10*3/MM3 (ref 0–0.05)
IMM GRANULOCYTES NFR BLD AUTO: 0.4 % (ref 0–0.5)
LYMPHOCYTES # BLD AUTO: 1.14 10*3/MM3 (ref 0.7–3.1)
LYMPHOCYTES NFR BLD AUTO: 12.5 % (ref 19.6–45.3)
MCH RBC QN AUTO: 31.1 PG (ref 26.6–33)
MCHC RBC AUTO-ENTMCNC: 36.2 G/DL (ref 31.5–35.7)
MCV RBC AUTO: 86.1 FL (ref 79–97)
MONOCYTES # BLD AUTO: 0.93 10*3/MM3 (ref 0.1–0.9)
MONOCYTES NFR BLD AUTO: 10.2 % (ref 5–12)
NEUTROPHILS # BLD AUTO: 6.75 10*3/MM3 (ref 1.7–7)
NEUTROPHILS NFR BLD AUTO: 73.9 % (ref 42.7–76)
NRBC BLD AUTO-RTO: 0 /100 WBC (ref 0–0.2)
PLATELET # BLD AUTO: 187 10*3/MM3 (ref 140–450)
PMV BLD AUTO: 11.4 FL (ref 6–12)
POTASSIUM BLD-SCNC: 4.2 MMOL/L (ref 3.5–5.2)
RBC # BLD AUTO: 3.66 10*6/MM3 (ref 4.14–5.8)
SODIUM BLD-SCNC: 134 MMOL/L (ref 136–145)
WBC NRBC COR # BLD: 9.13 10*3/MM3 (ref 3.4–10.8)

## 2019-11-22 PROCEDURE — 85025 COMPLETE CBC W/AUTO DIFF WBC: CPT | Performed by: UROLOGY

## 2019-11-22 PROCEDURE — 80048 BASIC METABOLIC PNL TOTAL CA: CPT | Performed by: UROLOGY

## 2019-11-22 PROCEDURE — 97110 THERAPEUTIC EXERCISES: CPT

## 2019-11-22 PROCEDURE — 94799 UNLISTED PULMONARY SVC/PX: CPT

## 2019-11-22 PROCEDURE — 82962 GLUCOSE BLOOD TEST: CPT

## 2019-11-22 RX ORDER — BISACODYL 10 MG
10 SUPPOSITORY, RECTAL RECTAL DAILY
Status: DISCONTINUED | OUTPATIENT
Start: 2019-11-22 | End: 2019-11-22 | Stop reason: HOSPADM

## 2019-11-22 RX ORDER — DOCUSATE SODIUM 250 MG
250 CAPSULE ORAL 2 TIMES DAILY PRN
Qty: 30 CAPSULE | Refills: 1 | Status: SHIPPED | OUTPATIENT
Start: 2019-11-22 | End: 2021-09-10

## 2019-11-22 RX ADMIN — SODIUM CHLORIDE 100 ML/HR: 4.5 INJECTION, SOLUTION INTRAVENOUS at 03:00

## 2019-11-22 RX ADMIN — AMLODIPINE BESYLATE 10 MG: 10 TABLET ORAL at 09:33

## 2019-11-22 RX ADMIN — ATORVASTATIN CALCIUM 10 MG: 10 TABLET, FILM COATED ORAL at 09:33

## 2019-11-22 RX ADMIN — TAMSULOSIN HYDROCHLORIDE 0.4 MG: 0.4 CAPSULE ORAL at 09:32

## 2019-11-22 RX ADMIN — HYDROCODONE BITARTRATE AND ACETAMINOPHEN 1 TABLET: 10; 325 TABLET ORAL at 00:45

## 2019-11-22 RX ADMIN — NEBIVOLOL HYDROCHLORIDE 10 MG: 10 TABLET ORAL at 09:33

## 2019-11-22 RX ADMIN — TIZANIDINE 4 MG: 4 TABLET ORAL at 09:33

## 2019-11-22 RX ADMIN — SENNOSIDES AND DOCUSATE SODIUM 2 TABLET: 8.6; 5 TABLET ORAL at 09:33

## 2019-11-22 RX ADMIN — PANTOPRAZOLE SODIUM 40 MG: 40 TABLET, DELAYED RELEASE ORAL at 05:13

## 2019-11-22 RX ADMIN — HYDROCODONE BITARTRATE AND ACETAMINOPHEN 1 TABLET: 10; 325 TABLET ORAL at 10:57

## 2019-11-22 RX ADMIN — HYDROCODONE BITARTRATE AND ACETAMINOPHEN 1 TABLET: 10; 325 TABLET ORAL at 05:15

## 2019-11-23 ENCOUNTER — READMISSION MANAGEMENT (OUTPATIENT)
Dept: CALL CENTER | Facility: HOSPITAL | Age: 69
End: 2019-11-23

## 2019-11-23 LAB
ABO + RH BLD: NORMAL
ABO + RH BLD: NORMAL
BH BB BLOOD EXPIRATION DATE: NORMAL
BH BB BLOOD EXPIRATION DATE: NORMAL
BH BB BLOOD TYPE BARCODE: 5100
BH BB BLOOD TYPE BARCODE: 5100
BH BB DISPENSE STATUS: NORMAL
BH BB DISPENSE STATUS: NORMAL
BH BB PRODUCT CODE: NORMAL
BH BB PRODUCT CODE: NORMAL
BH BB UNIT NUMBER: NORMAL
BH BB UNIT NUMBER: NORMAL
CROSSMATCH INTERPRETATION: NORMAL
CROSSMATCH INTERPRETATION: NORMAL
UNIT  ABO: NORMAL
UNIT  ABO: NORMAL
UNIT  RH: NORMAL
UNIT  RH: NORMAL

## 2019-11-23 NOTE — OUTREACH NOTE
Prep Survey      Responses   Facility patient discharged from?  [facility]   Is patient eligible?  Yes   Discharge diagnosis  left partial nephrectoy this visit   Does the patient have one of the following disease processes/diagnoses(primary or secondary)?  General Surgery   Does the patient have Home health ordered?  No   Is there a DME ordered?  No   Prep survey completed?  Yes          Sherin Hastings RN

## 2019-11-25 ENCOUNTER — READMISSION MANAGEMENT (OUTPATIENT)
Dept: CALL CENTER | Facility: HOSPITAL | Age: 69
End: 2019-11-25

## 2019-11-25 NOTE — OUTREACH NOTE
General Surgery Week 1 Survey      Responses   Facility patient discharged from?  [place]   Does the patient have one of the following disease processes/diagnoses(primary or secondary)?  General Surgery   Is there a successful TCM telephone encounter documented?  No   Week 1 attempt successful?  Yes   Call start time  1209   Call end time  1214   Discharge diagnosis  left partial nephrectoy this visit   Meds reviewed with patient/caregiver?  Yes   Is the patient having any side effects they believe may be caused by any medication additions or changes?  No   Does the patient have all medications related to this admission filled (includes all antibiotics, pain medications, etc.)  Yes   Is the patient taking all medications as directed (includes completed medication regime)?  Yes   Does the patient have a follow up appointment scheduled with their surgeon?  No [Will call to schedule an appointment today]   What is preventing the patient from scheduling follow up appointments?  Waiting on return call   Nursing Interventions  Educated patient on importance of making appointment   Has the patient kept scheduled appointments due by today?  N/A   Comments  Pt will schedule an appointment with PCP soon   Psychosocial issues?  No   Did the patient receive a copy of their discharge instructions?  Yes   Nursing interventions  Reviewed instructions with patient   What is the patient's perception of their health status since discharge?  Improving   Nursing interventions  Nurse provided patient education   Is the patient /caregiver able to teach back basic post-op care?  Drive as instructed by MD in discharge instructions, Take showers only when approved by MD-sponge bathe until then, No tub bath, swimming, or hot tub until instructed by MD, Lifting as instructed by MD in discharge instructions   Is the patient/caregiver able to teach back signs and symptoms of incisional infection?  Fever, Increased redness, swelling or pain  at the [facility] site   Is the patient/caregiver able to teach back steps to recovery at home?  Set small, achievable goals for return to baseline health, Rest and rebuild strength, gradually increase activity, Eat a well-balance diet   If the patient is a current smoker, are they able to teach back resources for cessation?  -- [Nonsmoker]   Is the patient/caregiver able to teach back the hierarchy of who to call/visit for symptoms/problems? PCP, Specialist, Home health nurse, Urgent Care, ED, 911  Yes   Week 1 call completed?  Yes          Karley Cox RN

## 2019-11-27 LAB
CYTO UR: NORMAL
LAB AP CASE REPORT: NORMAL
LAB AP DIAGNOSIS COMMENT: NORMAL
LAB AP SYNOPTIC CHECKLIST: NORMAL
PATH REPORT.FINAL DX SPEC: NORMAL
PATH REPORT.GROSS SPEC: NORMAL

## 2019-12-03 ENCOUNTER — READMISSION MANAGEMENT (OUTPATIENT)
Dept: CALL CENTER | Facility: HOSPITAL | Age: 69
End: 2019-12-03

## 2019-12-03 NOTE — OUTREACH NOTE
General Surgery Week 2 Survey      Responses   Facility patient discharged from?  [place]   Does the patient have one of the following disease processes/diagnoses(primary or secondary)?  General Surgery   Week 2 attempt successful?  No   Unsuccessful attempts  Attempt 1          Chela Smith RN

## 2019-12-04 ENCOUNTER — READMISSION MANAGEMENT (OUTPATIENT)
Dept: CALL CENTER | Facility: HOSPITAL | Age: 69
End: 2019-12-04

## 2019-12-04 NOTE — OUTREACH NOTE
General Surgery Week 2 Survey      Responses   Facility patient discharged from?  [place]   Does the patient have one of the following disease processes/diagnoses(primary or secondary)?  General Surgery   Week 2 attempt successful?  Yes   Call start time  1514   Rescheduled  Revoked [Does not feel calls will be necessary. Went to MD today, staples removed. Aware of Nurse Call Center.]   Revoke  Decline to participate   Discharge diagnosis  left partial nephrectoy this visit          Mae Duncan RN

## 2019-12-06 NOTE — DISCHARGE SUMMARY
Date of admission:  11/19/2019   Date of discharge:  11/22/2019   Admitting diagnosis:  Left renal mass  Discharge diagnosis:  Same    Procedures:  Left partial nephrectomy    Hospital course:  The patient was taken to the OR on the day of admission for the above mentioned surgery. Postoperatively he was managed on the surgical floor and comanaged by the A team. His diet was slowly advanced, and he ambulated without difficulty. His labs and vitals remained stable throughout his admission. By the date of discharge, he was tolerating solid food and ambulating without difficulty. He failed a voiding trial however and was discharged with an indwelling lee in place.    Discharge medications:       Discharge Medications      New Medications      Instructions Start Date   ACCU-CHEK FASTCLIX LANCETS misc   TEST BEFORE MEALS AND AT BEDTIME      ACCU-CHEK GUIDE test strip  Generic drug:  glucose blood   TEST BEFORE MEALS AND AT BEDTIME      docusate sodium 250 MG capsule  Commonly known as:  COLACE   250 mg, Oral, 2 Times Daily PRN      NOVOLOG FLEXPEN 100 UNIT/ML solution pen-injector sc pen  Generic drug:  insulin aspart   Subcutaneous      UNIFINE PENTIPS 31G X 5 MM misc  Generic drug:  Insulin Pen Needle   USE WITH INSULIN         Continue These Medications      Instructions Start Date   allopurinol 100 MG tablet  Commonly known as:  ZYLOPRIM   100 mg, Oral, Every Evening      amLODIPine 10 MG tablet  Commonly known as:  NORVASC   10 mg, Oral, Every Morning      BYSTOLIC 10 MG tablet  Generic drug:  nebivolol   10 mg, Oral, Every Morning      HYDROcodone-acetaminophen  MG per tablet  Commonly known as:  NORCO   1 tablet, Oral, Every 4 Hours PRN      omeprazole 40 MG capsule  Commonly known as:  priLOSEC   40 mg, Oral, Every Morning      simvastatin 20 MG tablet  Commonly known as:  ZOCOR   20 mg, Oral      tiZANidine 4 MG tablet  Commonly known as:  ZANAFLEX   4 mg, Oral, 2 Times Daily      traZODone 100 MG  tablet  Commonly known as:  DESYREL   100 mg, Oral, Nightly      vitamin D3 125 MCG (5000 UT) capsule capsule   5,000 Units, Oral, Daily         Stop These Medications    aspirin 81 MG chewable tablet     glipizide 10 MG tablet  Commonly known as:  GLUCOTROL     insulin glargine 100 UNIT/ML injection  Commonly known as:  LANTUS     losartan-hydrochlorothiazide 100-25 MG per tablet  Commonly known as:  HYZAAR     metFORMIN 1000 MG tablet  Commonly known as:  GLUCOPHAGE             ROV:  1 week

## 2019-12-18 DIAGNOSIS — Z79.4 TYPE 2 DIABETES MELLITUS WITH HYPERGLYCEMIA, WITH LONG-TERM CURRENT USE OF INSULIN (HCC): Primary | ICD-10-CM

## 2019-12-18 DIAGNOSIS — E11.65 TYPE 2 DIABETES MELLITUS WITH HYPERGLYCEMIA, WITH LONG-TERM CURRENT USE OF INSULIN (HCC): Primary | ICD-10-CM

## 2020-01-07 ENCOUNTER — TELEPHONE (OUTPATIENT)
Dept: ENDOCRINOLOGY | Facility: CLINIC | Age: 70
End: 2020-01-07

## 2020-01-08 ENCOUNTER — OFFICE VISIT (OUTPATIENT)
Dept: ENDOCRINOLOGY | Facility: CLINIC | Age: 70
End: 2020-01-08

## 2020-01-08 DIAGNOSIS — E11.65 TYPE 2 DIABETES MELLITUS WITH HYPERGLYCEMIA, WITH LONG-TERM CURRENT USE OF INSULIN (HCC): Primary | ICD-10-CM

## 2020-01-08 DIAGNOSIS — Z79.4 TYPE 2 DIABETES MELLITUS WITH HYPERGLYCEMIA, WITH LONG-TERM CURRENT USE OF INSULIN (HCC): Primary | ICD-10-CM

## 2020-01-08 PROCEDURE — G0108 DIAB MANAGE TRN  PER INDIV: HCPCS | Performed by: INTERNAL MEDICINE

## 2020-01-15 ENCOUNTER — OFFICE VISIT (OUTPATIENT)
Dept: ENDOCRINOLOGY | Facility: CLINIC | Age: 70
End: 2020-01-15

## 2020-01-15 DIAGNOSIS — Z79.4 TYPE 2 DIABETES MELLITUS WITH HYPERGLYCEMIA, WITH LONG-TERM CURRENT USE OF INSULIN (HCC): Primary | ICD-10-CM

## 2020-01-15 DIAGNOSIS — E11.65 TYPE 2 DIABETES MELLITUS WITH HYPERGLYCEMIA, WITH LONG-TERM CURRENT USE OF INSULIN (HCC): Primary | ICD-10-CM

## 2020-01-15 PROCEDURE — G0109 DIAB MANAGE TRN IND/GROUP: HCPCS | Performed by: INTERNAL MEDICINE

## 2020-02-05 ENCOUNTER — OFFICE VISIT (OUTPATIENT)
Dept: ENDOCRINOLOGY | Facility: CLINIC | Age: 70
End: 2020-02-05

## 2020-02-05 DIAGNOSIS — E11.40 TYPE 2 DIABETES MELLITUS WITH DIABETIC NEUROPATHY, WITH LONG-TERM CURRENT USE OF INSULIN (HCC): ICD-10-CM

## 2020-02-05 DIAGNOSIS — Z79.4 TYPE 2 DIABETES MELLITUS WITH DIABETIC NEUROPATHY, WITH LONG-TERM CURRENT USE OF INSULIN (HCC): ICD-10-CM

## 2020-02-05 PROCEDURE — G0109 DIAB MANAGE TRN IND/GROUP: HCPCS | Performed by: DIETITIAN, REGISTERED

## 2020-03-12 ENCOUNTER — TRANSCRIBE ORDERS (OUTPATIENT)
Dept: ADMINISTRATIVE | Facility: HOSPITAL | Age: 70
End: 2020-03-12

## 2020-03-12 DIAGNOSIS — N28.89 RENAL MASS: Primary | ICD-10-CM

## 2020-06-04 ENCOUNTER — HOSPITAL ENCOUNTER (OUTPATIENT)
Dept: NUCLEAR MEDICINE | Facility: HOSPITAL | Age: 70
Discharge: HOME | End: 2020-06-04

## 2020-06-04 ENCOUNTER — APPOINTMENT (OUTPATIENT)
Dept: PET IMAGING | Facility: HOSPITAL | Age: 70
End: 2020-06-04

## 2020-06-04 DIAGNOSIS — N28.89 RENAL MASS: ICD-10-CM

## 2020-06-04 PROCEDURE — A9503 TC99M MEDRONATE: HCPCS | Performed by: UROLOGY

## 2020-06-04 PROCEDURE — 0 TECHNETIUM MEDRONATE KIT: Performed by: UROLOGY

## 2020-06-04 PROCEDURE — 78306 BONE IMAGING WHOLE BODY: CPT

## 2020-06-04 RX ORDER — TC 99M MEDRONATE 20 MG/10ML
22.4 INJECTION, POWDER, LYOPHILIZED, FOR SOLUTION INTRAVENOUS
Status: COMPLETED | OUTPATIENT
Start: 2020-06-04 | End: 2020-06-04

## 2020-06-04 RX ADMIN — Medication 22.4 MILLICURIE: at 09:47

## 2020-08-17 PROBLEM — N41.3 PROSTATOCYSTITIS: Status: ACTIVE | Noted: 2020-08-17

## 2020-08-17 PROBLEM — E11.42 DIABETIC PERIPHERAL NEUROPATHY ASSOCIATED WITH TYPE 2 DIABETES MELLITUS: Status: ACTIVE | Noted: 2020-08-17

## 2020-08-17 PROBLEM — E11.65 HYPERGLYCEMIA DUE TO TYPE 2 DIABETES MELLITUS: Status: ACTIVE | Noted: 2020-08-17

## 2020-08-17 PROBLEM — N18.30 CHRONIC RENAL INSUFFICIENCY, STAGE III (MODERATE): Status: ACTIVE | Noted: 2020-08-17

## 2020-08-17 PROBLEM — I10 ESSENTIAL HYPERTENSION: Status: ACTIVE | Noted: 2020-08-17

## 2020-08-17 RX ORDER — PREGABALIN 75 MG/1
CAPSULE ORAL
COMMUNITY
Start: 2020-05-29 | End: 2021-01-12

## 2020-08-17 RX ORDER — LOSARTAN POTASSIUM AND HYDROCHLOROTHIAZIDE 25; 100 MG/1; MG/1
1 TABLET ORAL DAILY
COMMUNITY
Start: 2020-05-29

## 2020-08-17 RX ORDER — SIMVASTATIN 40 MG
TABLET ORAL
COMMUNITY
Start: 2020-05-28 | End: 2021-01-12

## 2021-01-12 ENCOUNTER — LAB (OUTPATIENT)
Dept: LAB | Facility: HOSPITAL | Age: 71
End: 2021-01-12

## 2021-01-12 ENCOUNTER — OFFICE VISIT (OUTPATIENT)
Dept: ENDOCRINOLOGY | Facility: CLINIC | Age: 71
End: 2021-01-12

## 2021-01-12 VITALS
SYSTOLIC BLOOD PRESSURE: 140 MMHG | HEIGHT: 75 IN | BODY MASS INDEX: 30.21 KG/M2 | WEIGHT: 243 LBS | TEMPERATURE: 96.6 F | OXYGEN SATURATION: 97 % | HEART RATE: 54 BPM | DIASTOLIC BLOOD PRESSURE: 80 MMHG

## 2021-01-12 DIAGNOSIS — E78.2 MIXED HYPERLIPIDEMIA: ICD-10-CM

## 2021-01-12 DIAGNOSIS — I10 ESSENTIAL HYPERTENSION: ICD-10-CM

## 2021-01-12 DIAGNOSIS — E11.42 DIABETIC PERIPHERAL NEUROPATHY (HCC): ICD-10-CM

## 2021-01-12 DIAGNOSIS — N18.32 STAGE 3B CHRONIC KIDNEY DISEASE (HCC): ICD-10-CM

## 2021-01-12 DIAGNOSIS — E11.65 TYPE 2 DIABETES MELLITUS WITH HYPERGLYCEMIA, WITH LONG-TERM CURRENT USE OF INSULIN (HCC): Primary | ICD-10-CM

## 2021-01-12 DIAGNOSIS — Z79.4 TYPE 2 DIABETES MELLITUS WITH HYPERGLYCEMIA, WITH LONG-TERM CURRENT USE OF INSULIN (HCC): ICD-10-CM

## 2021-01-12 DIAGNOSIS — E11.65 TYPE 2 DIABETES MELLITUS WITH HYPERGLYCEMIA, WITH LONG-TERM CURRENT USE OF INSULIN (HCC): ICD-10-CM

## 2021-01-12 DIAGNOSIS — Z79.4 TYPE 2 DIABETES MELLITUS WITH HYPERGLYCEMIA, WITH LONG-TERM CURRENT USE OF INSULIN (HCC): Primary | ICD-10-CM

## 2021-01-12 PROBLEM — N18.30 STAGE 3 CHRONIC KIDNEY DISEASE: Status: ACTIVE | Noted: 2021-01-12

## 2021-01-12 LAB
ALBUMIN SERPL-MCNC: 4.4 G/DL (ref 3.5–5.2)
ALBUMIN UR-MCNC: 2.7 MG/DL
ALBUMIN/GLOB SERPL: 1.4 G/DL
ALP SERPL-CCNC: 205 U/L (ref 39–117)
ALT SERPL W P-5'-P-CCNC: 16 U/L (ref 1–41)
ANION GAP SERPL CALCULATED.3IONS-SCNC: 9.4 MMOL/L (ref 5–15)
AST SERPL-CCNC: 20 U/L (ref 1–40)
BILIRUB SERPL-MCNC: 0.5 MG/DL (ref 0–1.2)
BUN SERPL-MCNC: 31 MG/DL (ref 8–23)
BUN/CREAT SERPL: 14.8 (ref 7–25)
CALCIUM SPEC-SCNC: 9.7 MG/DL (ref 8.6–10.5)
CHLORIDE SERPL-SCNC: 102 MMOL/L (ref 98–107)
CHOLEST SERPL-MCNC: 175 MG/DL (ref 0–200)
CO2 SERPL-SCNC: 25.6 MMOL/L (ref 22–29)
CREAT SERPL-MCNC: 2.1 MG/DL (ref 0.76–1.27)
CREAT UR-MCNC: 89.3 MG/DL
GFR SERPL CREATININE-BSD FRML MDRD: 31 ML/MIN/1.73
GLOBULIN UR ELPH-MCNC: 3.2 GM/DL
GLUCOSE BLDC GLUCOMTR-MCNC: 183 MG/DL (ref 70–105)
GLUCOSE SERPL-MCNC: 186 MG/DL (ref 65–99)
HBA1C MFR BLD: 8.8 % (ref 3.5–5.6)
HDLC SERPL-MCNC: 41 MG/DL (ref 40–60)
LDLC SERPL CALC-MCNC: 80 MG/DL (ref 0–100)
LDLC/HDLC SERPL: 1.63 {RATIO}
MICROALBUMIN/CREAT UR: 30.2 MG/G
POTASSIUM SERPL-SCNC: 4.9 MMOL/L (ref 3.5–5.2)
PROT SERPL-MCNC: 7.6 G/DL (ref 6–8.5)
SODIUM SERPL-SCNC: 137 MMOL/L (ref 136–145)
T4 FREE SERPL-MCNC: 1.18 NG/DL (ref 0.93–1.7)
TRIGL SERPL-MCNC: 335 MG/DL (ref 0–150)
TSH SERPL DL<=0.05 MIU/L-ACNC: 2.41 UIU/ML (ref 0.27–4.2)
VLDLC SERPL-MCNC: 54 MG/DL (ref 5–40)

## 2021-01-12 PROCEDURE — 36415 COLL VENOUS BLD VENIPUNCTURE: CPT

## 2021-01-12 PROCEDURE — 82570 ASSAY OF URINE CREATININE: CPT

## 2021-01-12 PROCEDURE — 83036 HEMOGLOBIN GLYCOSYLATED A1C: CPT

## 2021-01-12 PROCEDURE — 84439 ASSAY OF FREE THYROXINE: CPT

## 2021-01-12 PROCEDURE — 82962 GLUCOSE BLOOD TEST: CPT | Performed by: INTERNAL MEDICINE

## 2021-01-12 PROCEDURE — 80053 COMPREHEN METABOLIC PANEL: CPT

## 2021-01-12 PROCEDURE — 82043 UR ALBUMIN QUANTITATIVE: CPT

## 2021-01-12 PROCEDURE — 99214 OFFICE O/P EST MOD 30 MIN: CPT | Performed by: INTERNAL MEDICINE

## 2021-01-12 PROCEDURE — 80061 LIPID PANEL: CPT

## 2021-01-12 PROCEDURE — 84443 ASSAY THYROID STIM HORMONE: CPT

## 2021-01-12 RX ORDER — BIMATOPROST 0.3 MG/ML
1 SOLUTION/ DROPS OPHTHALMIC NIGHTLY
COMMUNITY
Start: 2020-12-09

## 2021-01-12 RX ORDER — GLIPIZIDE 10 MG/1
10 TABLET ORAL 2 TIMES DAILY
COMMUNITY
Start: 2021-01-08 | End: 2022-08-09

## 2021-01-12 RX ORDER — ERGOCALCIFEROL (VITAMIN D2) 50 MCG
2000 CAPSULE ORAL DAILY
Qty: 100 CAPSULE | Refills: 4 | Status: SHIPPED | OUTPATIENT
Start: 2021-01-12 | End: 2021-07-09 | Stop reason: SDUPTHER

## 2021-01-12 RX ORDER — FENOFIBRATE 200 MG/1
200 CAPSULE ORAL NIGHTLY
COMMUNITY
Start: 2021-01-08 | End: 2022-08-04

## 2021-01-12 RX ORDER — SEMAGLUTIDE 1.34 MG/ML
INJECTION, SOLUTION SUBCUTANEOUS
Qty: 2 ML | Refills: 6 | Status: SHIPPED | OUTPATIENT
Start: 2021-01-12 | End: 2021-07-09 | Stop reason: SDUPTHER

## 2021-01-12 RX ORDER — ATORVASTATIN CALCIUM 40 MG/1
40 TABLET, FILM COATED ORAL DAILY
COMMUNITY
Start: 2021-01-08 | End: 2021-01-12

## 2021-01-12 RX ORDER — FENOFIBRATE 160 MG/1
200 TABLET ORAL DAILY
COMMUNITY
Start: 2020-11-12 | End: 2021-01-12

## 2021-01-12 RX ORDER — PREGABALIN 200 MG/1
200 CAPSULE ORAL
COMMUNITY
Start: 2021-01-08

## 2021-01-12 RX ORDER — MAGNESIUM 200 MG
1000 TABLET ORAL DAILY
Qty: 100 EACH | Refills: 4 | Status: SHIPPED | OUTPATIENT
Start: 2021-01-12 | End: 2021-09-10

## 2021-01-12 RX ORDER — FERROUS SULFATE TAB EC 324 MG (65 MG FE EQUIVALENT) 324 (65 FE) MG
324 TABLET DELAYED RESPONSE ORAL
COMMUNITY
Start: 2021-01-08

## 2021-01-12 RX ORDER — INSULIN GLARGINE 100 [IU]/ML
INJECTION, SOLUTION SUBCUTANEOUS
COMMUNITY
End: 2021-01-12

## 2021-01-12 NOTE — PATIENT INSTRUCTIONS
Please decrease Metformin to 500 mg twice a day  Please start Ozempic 0.25 mg subcu weekly for 4 weeks and if able to tolerate without any abdominal pain, nausea or vomiting for 4 weeks then increase the dose to 0.50 mg subcu weekly.  Watch for low blood sugars and if your blood sugars start running less than 100 then DC glipizide  Always keep glucose source in case of low blood sugar  Annual eye exam and flu vaccine  Start vitamin D 2000 units p.o. daily  Start vitamin B12 1000 mcg sublingual daily  Labs today  Please work on your diet and activity.

## 2021-01-12 NOTE — PROGRESS NOTES
Endocrine Consult Outpatient  Referred by Ms. Rivera for uncontrolled type 2 diabetes  Patient Care Team:  Zoya Claros PA as PCP - General (Physician Assistant)     Chief Complaint: Uncontrolled type 2 diabetes    HPI: 70-year-old male with a diagnosis of type 2 diabetes, hypertension and hyperlipidemia as well as CKD stage III disease is referred for diabetes consultation.  For type 2 diabetes: Initial diagnosis was in , he had an partial diabetes education.  He is currently on Metformin 1000 twice a day with glipizide 10 mg twice a day.  He is telling me the blood sugars are running more than 150 most of the time at this time.  Is not able to follow his diet the best he can.  Hypertension: Fair control  Hyperlipidemia: Currently on fenofibrate.    Old records reviewed: Labs from 2020 showed a sodium 137, potassium 5.0, chloride 104, CO2 28, glucose 198, BUN 22, creatinine 1.7, EGFR 40, AST 30 and ALT 18.  Any trouble with    Past Medical History:   Diagnosis Date   • Chronic back pain    • Diabetes mellitus (CMS/[facility])     TYPE 2   • GERD (gastroesophageal reflux disease)    • Gout    • Hyperlipidemia    • Hypertension    • Left kidney mass    • Neuropathy     IN FEET   • Type 2 diabetes mellitus (CMS/[facility])        Social History     Socioeconomic History   • Marital status: Single     Spouse name: Not on file   • Number of children: Not on file   • Years of education: Not on file   • Highest education level: Not on file   Tobacco Use   • Smoking status: Former Smoker     Years: 1.00     Types: Cigarettes     Quit date:      Years since quittin.0   • Smokeless tobacco: Never Used   Substance and Sexual Activity   • Alcohol use: Yes     Alcohol/week: 14.0 - 21.0 standard drinks     Types: 14 - 21 Shots of liquor per week     Comment: NONE SINCE 18   • Drug use: No   • Sexual activity: Defer       Family History   Problem Relation Age of Onset   • Diabetes Paternal  Grandmother    • Diabetes Paternal Grandfather    • Hypertension Maternal Grandmother    • Malig Hyperthermia Neg Hx    • Cancer Neg Hx        No Known Allergies    ROS:   Constitutional:  Denies fatigue, tiredness.    Eyes:  Denies change in visual acuity   HENT:  Denies nasal congestion or sore throat   Respiratory: denies cough, shortness of breath.   Cardiovascular:  denies chest pain, edema   GI:  Denies abdominal pain, nausea, vomiting.    :  Denies dysuria   Musculoskeletal:  Denies back pain or joint pain   Integument:  Denies dry skin, rash   Neurologic:  Denies headache, focal weakness or sensory changes   Endocrine:  Denies polyuria or polydipsia   Psychiatric:  Admit depression and anxiety      Vitals:    01/12/21 1031   BP: 140/80   Pulse: 54   Temp: 96.6 °F (35.9 °C)   SpO2: 97%        Physical Exam:  GEN: NAD, conversant  EYES: EOMI, PERRL, no conjunctival erythema  NECK: no thyromegaly, full ROM   CV: RRR, no murmurs/rubs/gallops, no peripheral edema  LUNG: CTAB, no wheezes/rales/ronchi  SKIN: no rashes, no acanthosis  MSK: no deformities, full ROM of all extremities  NEURO: no tremors, DTR normal  PSYCH: AOX3, appropriate mood, affect normal  FEET: Has callus on the toes as well as bunion and athlete's foot.    Results Review:     I reviewed the patient's new clinical results.    Lab Results   Component Value Date    GLUCOSE 104 (H) 11/22/2019    BUN 22 (H) 07/01/2020    CREATININE 1.7 (H) 07/01/2020    EGFRIFNONA 34 (L) 11/22/2019    BCR 12.9 07/01/2020    K 5.0 07/01/2020    CO2 28 07/01/2020    CALCIUM 9.6 07/01/2020    ALBUMIN 4.0 07/01/2020    LABIL2 1.1 07/01/2020    AST 30 07/01/2020    ALT 18 07/01/2020     Lab Results   Component Value Date    HGBA1C 5.60 11/20/2019     Lab Results   Component Value Date    CREATININE 1.7 (H) 07/01/2020       Medication Review: Reviewed.       Current Outpatient Medications:   •  ACCU-CHEK FASTCLIX LANCETS misc, TEST BEFORE MEALS AND AT BEDTIME, Disp: 102  each, Rfl: 0  •  allopurinol (ZYLOPRIM) 100 MG tablet, Take 100 mg by mouth Every Evening., Disp: , Rfl:   •  amLODIPine (NORVASC) 10 MG tablet, Take 10 mg by mouth Every Morning., Disp: , Rfl:   •  bimatoprost (LUMIGAN) 0.03 % ophthalmic drops, INSTILL 1 DROP INTO BOTH EYES EVERY NIGHT AT BEDTIME, Disp: , Rfl:   •  Cholecalciferol (VITAMIN D3) 125 MCG (5000 UT) capsule capsule, Take 5,000 Units by mouth Daily., Disp: , Rfl:   •  docusate sodium (COLACE) 250 MG capsule, Take 1 capsule by mouth 2 (Two) Times a Day As Needed for Constipation., Disp: 30 capsule, Rfl: 1  •  fenofibrate micronized (LOFIBRA) 200 MG capsule, TAKE ONE (1) CAPSULE BY MOUTH EVERY DAY, Disp: , Rfl:   •  ferrous sulfate 324 (65 Fe) MG tablet delayed-release EC tablet, TAKE ONE (1) TABLET BY MOUTH EVERY DAY, Disp: , Rfl:   •  glipizide (GLUCOTROL) 10 MG tablet, Take 10 mg by mouth 2 (Two) Times a Day., Disp: , Rfl:   •  glucose blood test strip, TEST BEFORE MEALS AND AT BEDTIME, Disp: 100 each, Rfl: 0  •  HYDROcodone-acetaminophen (NORCO)  MG per tablet, Take 1 tablet by mouth Every 4 (Four) Hours As Needed for Moderate Pain ., Disp: , Rfl:   •  Insulin Pen Needle (UNIFINE PENTIPS) 31G X 5 MM misc, USE WITH INSULIN, Disp: 100 each, Rfl: 0  •  losartan-hydrochlorothiazide (HYZAAR) 100-25 MG per tablet, Take 1 tablet by mouth Daily., Disp: , Rfl:   •  metFORMIN (GLUCOPHAGE) 1000 MG tablet, Take 1,000 mg by mouth Daily With Breakfast., Disp: , Rfl:   •  nebivolol (BYSTOLIC) 10 MG tablet, Take 10 mg by mouth Every Morning., Disp: , Rfl:   •  omeprazole (priLOSEC) 40 MG capsule, Take 40 mg by mouth 2 (two) times a day., Disp: , Rfl:   •  pregabalin (LYRICA) 200 MG capsule, TAKE ONE (1) CAPSULE BY MOUTH EVERY DAY, Disp: , Rfl:   •  tiZANidine (ZANAFLEX) 4 MG tablet, Take 4 mg by mouth 2 (Two) Times a Day., Disp: , Rfl:   •  traZODone (DESYREL) 100 MG tablet, Take 100 mg by mouth Every Night. 2 tablet at night, Disp: , Rfl:     Assessment/Plan    1.  Diabetes mellitus type 2: Uncontrolled with significant hyperglycemia, I will decrease Metformin to 500 mg twice a day due to CKD stage III disease and add Ozempic 0.25 mg subcu weekly for 4 weeks and if able to tolerate then increase the dose to 0.50 mg subcu weekly.  Advised to watch for low blood sugars as he is on glipizide.  If the blood sugar starts coming down less than 100 then I will consider stopping glipizide.  Needs to work on his diet and activity but he feels like he can do it on himself.  Advised to get regular eye exam and flu vaccine.  Also advised to always keep glucose source in case of low blood sugar.  2.  Hypertension: Fair control, continue current medication  3.  Hyperlipidemia: Will follow lipid panel and make further recommendation  4.  Diabetic peripheral neuropathy: We will add vitamin B12 supplementation  5.  CKD stage III disease: Follows with nephrology.                    Nati Scruggs MD FACE.

## 2021-01-18 DIAGNOSIS — E11.65 TYPE 2 DIABETES MELLITUS WITH HYPERGLYCEMIA, WITH LONG-TERM CURRENT USE OF INSULIN (HCC): Primary | ICD-10-CM

## 2021-01-18 DIAGNOSIS — Z79.4 TYPE 2 DIABETES MELLITUS WITH HYPERGLYCEMIA, WITH LONG-TERM CURRENT USE OF INSULIN (HCC): Primary | ICD-10-CM

## 2021-01-18 DIAGNOSIS — N18.32 STAGE 3B CHRONIC KIDNEY DISEASE (HCC): ICD-10-CM

## 2021-03-02 DIAGNOSIS — Z23 IMMUNIZATION DUE: ICD-10-CM

## 2021-05-26 ENCOUNTER — TRANSCRIBE ORDERS (OUTPATIENT)
Dept: ADMINISTRATIVE | Facility: HOSPITAL | Age: 71
End: 2021-05-26

## 2021-05-26 DIAGNOSIS — N28.89 RENAL MASS: Primary | ICD-10-CM

## 2021-07-09 ENCOUNTER — LAB (OUTPATIENT)
Dept: LAB | Facility: HOSPITAL | Age: 71
End: 2021-07-09

## 2021-07-09 ENCOUNTER — OFFICE VISIT (OUTPATIENT)
Dept: ENDOCRINOLOGY | Facility: CLINIC | Age: 71
End: 2021-07-09

## 2021-07-09 VITALS
BODY MASS INDEX: 29.72 KG/M2 | OXYGEN SATURATION: 98 % | WEIGHT: 239 LBS | HEIGHT: 75 IN | SYSTOLIC BLOOD PRESSURE: 138 MMHG | DIASTOLIC BLOOD PRESSURE: 78 MMHG | HEART RATE: 56 BPM

## 2021-07-09 DIAGNOSIS — Z79.4 TYPE 2 DIABETES MELLITUS WITH HYPERGLYCEMIA, WITH LONG-TERM CURRENT USE OF INSULIN (HCC): Primary | ICD-10-CM

## 2021-07-09 DIAGNOSIS — E11.65 TYPE 2 DIABETES MELLITUS WITH HYPERGLYCEMIA, WITH LONG-TERM CURRENT USE OF INSULIN (HCC): ICD-10-CM

## 2021-07-09 DIAGNOSIS — Z79.4 TYPE 2 DIABETES MELLITUS WITH HYPERGLYCEMIA, WITH LONG-TERM CURRENT USE OF INSULIN (HCC): ICD-10-CM

## 2021-07-09 DIAGNOSIS — N18.32 STAGE 3B CHRONIC KIDNEY DISEASE (HCC): ICD-10-CM

## 2021-07-09 DIAGNOSIS — E11.65 TYPE 2 DIABETES MELLITUS WITH HYPERGLYCEMIA, WITH LONG-TERM CURRENT USE OF INSULIN (HCC): Primary | ICD-10-CM

## 2021-07-09 DIAGNOSIS — E78.2 MIXED HYPERLIPIDEMIA: ICD-10-CM

## 2021-07-09 DIAGNOSIS — I10 ESSENTIAL HYPERTENSION: ICD-10-CM

## 2021-07-09 PROBLEM — E11.42 DIABETIC PERIPHERAL NEUROPATHY: Status: ACTIVE | Noted: 2019-11-20

## 2021-07-09 LAB
ALBUMIN SERPL-MCNC: 4.4 G/DL (ref 3.5–5.2)
ALBUMIN UR-MCNC: 1.4 MG/DL
ALBUMIN/GLOB SERPL: 1.4 G/DL
ALP SERPL-CCNC: 196 U/L (ref 39–117)
ALT SERPL W P-5'-P-CCNC: 22 U/L (ref 1–41)
ANION GAP SERPL CALCULATED.3IONS-SCNC: 10.7 MMOL/L (ref 5–15)
AST SERPL-CCNC: 28 U/L (ref 1–40)
BILIRUB SERPL-MCNC: 0.4 MG/DL (ref 0–1.2)
BUN SERPL-MCNC: 26 MG/DL (ref 8–23)
BUN/CREAT SERPL: 11.6 (ref 7–25)
CALCIUM SPEC-SCNC: 9.5 MG/DL (ref 8.6–10.5)
CHLORIDE SERPL-SCNC: 101 MMOL/L (ref 98–107)
CHOLEST SERPL-MCNC: 194 MG/DL (ref 0–200)
CO2 SERPL-SCNC: 23.3 MMOL/L (ref 22–29)
CREAT SERPL-MCNC: 2.25 MG/DL (ref 0.76–1.27)
CREAT UR-MCNC: 65.7 MG/DL
GFR SERPL CREATININE-BSD FRML MDRD: 29 ML/MIN/1.73
GLOBULIN UR ELPH-MCNC: 3.1 GM/DL
GLUCOSE BLDC GLUCOMTR-MCNC: 133 MG/DL (ref 70–105)
GLUCOSE SERPL-MCNC: 132 MG/DL (ref 65–99)
HBA1C MFR BLD: 7.2 % (ref 3.5–5.6)
HDLC SERPL-MCNC: 38 MG/DL (ref 40–60)
LDLC SERPL CALC-MCNC: 101 MG/DL (ref 0–100)
LDLC/HDLC SERPL: 2.39 {RATIO}
MICROALBUMIN/CREAT UR: 21.3 MG/G
POTASSIUM SERPL-SCNC: 4.9 MMOL/L (ref 3.5–5.2)
PROT SERPL-MCNC: 7.5 G/DL (ref 6–8.5)
SODIUM SERPL-SCNC: 135 MMOL/L (ref 136–145)
TRIGL SERPL-MCNC: 325 MG/DL (ref 0–150)
VLDLC SERPL-MCNC: 55 MG/DL (ref 5–40)

## 2021-07-09 PROCEDURE — 80053 COMPREHEN METABOLIC PANEL: CPT

## 2021-07-09 PROCEDURE — 82043 UR ALBUMIN QUANTITATIVE: CPT

## 2021-07-09 PROCEDURE — 36415 COLL VENOUS BLD VENIPUNCTURE: CPT

## 2021-07-09 PROCEDURE — 99214 OFFICE O/P EST MOD 30 MIN: CPT | Performed by: INTERNAL MEDICINE

## 2021-07-09 PROCEDURE — 82962 GLUCOSE BLOOD TEST: CPT | Performed by: INTERNAL MEDICINE

## 2021-07-09 PROCEDURE — 80061 LIPID PANEL: CPT

## 2021-07-09 PROCEDURE — 83036 HEMOGLOBIN GLYCOSYLATED A1C: CPT

## 2021-07-09 PROCEDURE — 82570 ASSAY OF URINE CREATININE: CPT

## 2021-07-09 RX ORDER — ATORVASTATIN CALCIUM 40 MG/1
40 TABLET, FILM COATED ORAL NIGHTLY
COMMUNITY
Start: 2021-07-07

## 2021-07-09 RX ORDER — AMOXICILLIN 500 MG/1
500 CAPSULE ORAL 2 TIMES DAILY
COMMUNITY
Start: 2021-05-21 | End: 2021-07-09

## 2021-07-09 RX ORDER — ACETAMINOPHEN 160 MG
2000 TABLET,DISINTEGRATING ORAL DAILY
COMMUNITY
Start: 2021-06-14 | End: 2022-02-01

## 2021-07-09 RX ORDER — SEMAGLUTIDE 1.34 MG/ML
INJECTION, SOLUTION SUBCUTANEOUS
Qty: 2 ML | Refills: 6 | Status: SHIPPED | OUTPATIENT
Start: 2021-07-09 | End: 2021-07-23

## 2021-07-09 RX ORDER — TRIAMCINOLONE ACETONIDE 5 MG/G
1 CREAM TOPICAL DAILY
COMMUNITY
Start: 2021-05-12 | End: 2023-03-31 | Stop reason: HOSPADM

## 2021-07-09 NOTE — PROGRESS NOTES
Endocrine Progress Note Outpatient     Patient Care Team:  Zoya Claros PA as PCP - General (Physician Assistant)    Chief Complaint: Follow-up type 2 diabetes     {CC  Problem List  Visit Diagnosis   Encounters  Notes  Medications  Labs  Result Review Imaging  Media :23}     HPI: 71-year-old male with history of type 2 diabetes, hypertension, hyperlipidemia and CKD stage III disease is here for follow-up.  For type 2 diabetes: Currently on Metformin 5 mg twice a day with Ozempic 0.5 mg once a week.  He did bring in blood sugar records for review and mostly running less than 180.  He has lost 4 pounds since last seen.  He is also on glipizide 10 mg twice a day.  No low blood sugars noted.  Hypertension: Fair control  Hyperlipidemia: Currently taking fenofibrate  CKD stage III disease: Following with a nephrologist at [facility].    Past Medical History:   Diagnosis Date   • Chronic back pain    • Diabetes mellitus (CMS/HCC)     TYPE 2   • GERD (gastroesophageal reflux disease)    • Gout    • Hyperlipidemia    • Hypertension    • Left kidney mass    • Neuropathy     IN FEET   • Type 2 diabetes mellitus (CMS/HCC)        Social History     Socioeconomic History   • Marital status: Single     Spouse name: Not on file   • Number of children: Not on file   • Years of education: Not on file   • Highest education level: Not on file   Tobacco Use   • Smoking status: Former Smoker     Years: 1.00     Types: Cigarettes     Quit date:      Years since quittin.5   • Smokeless tobacco: Never Used   Vaping Use   • Vaping Use: Never used   Substance and Sexual Activity   • Alcohol use: Yes     Alcohol/week: 14.0 - 21.0 standard drinks     Types: 14 - 21 Shots of liquor per week     Comment: NONE SINCE 19   • Drug use: No   • Sexual activity: Defer       Family History   Problem Relation Age of Onset   • Diabetes Paternal Grandmother    • Diabetes Paternal Grandfather    •  Hypertension Maternal Grandmother    • Malig Hyperthermia Neg Hx    • Cancer Neg Hx        No Known Allergies    ROS:   Constitutional:  Denies fatigue, tiredness.    Eyes:  Denies change in visual acuity   HENT:  Denies nasal congestion or sore throat   Respiratory: denies cough, shortness of breath.   Cardiovascular:  denies chest pain, edema   GI:  Denies abdominal pain, nausea, vomiting.   Musculoskeletal:  Denies back pain or joint pain   Integument:  Denies dry skin and rash   Neurologic:  Denies headache, focal weakness or sensory changes   Endocrine:  Denies polyuria or polydipsia   Psychiatric:  Denies depression or anxiety      Vitals:    07/09/21 1056   BP: 138/78   Pulse: 56   SpO2: 98%     Body mass index is 29.87 kg/m².     Physical Exam:  GEN: NAD, conversant  EYES: EOMI, PERRL, no conjunctival erythema  NECK: no thyromegaly, full ROM   CV: RRR, no murmurs/rubs/gallops, no peripheral edema  LUNG: CTAB, no wheezes/rales/ronchi  SKIN: no rashes, no acanthosis  MSK: no deformities, full ROM of all extremities  NEURO: no tremors, DTR normal  PSYCH: AOX3, appropriate mood, affect normal      Results Review:     I reviewed the patient's new clinical results.    Lab Results   Component Value Date    HGBA1C 8.8 (H) 01/12/2021    HGBA1C 5.60 11/20/2019      Lab Results   Component Value Date    GLUCOSE 186 (H) 01/12/2021    BUN 31 (H) 01/12/2021    CREATININE 2.10 (H) 01/12/2021    EGFRIFNONA 31 (L) 01/12/2021    BCR 14.8 01/12/2021    K 4.9 01/12/2021    CO2 25.6 01/12/2021    CALCIUM 9.7 01/12/2021    ALBUMIN 4.40 01/12/2021    LABIL2 1.1 07/01/2020    AST 20 01/12/2021    ALT 16 01/12/2021    CHOL 175 01/12/2021    TRIG 335 (H) 01/12/2021    LDL 80 01/12/2021    HDL 41 01/12/2021     Lab Results   Component Value Date    TSH 2.410 01/12/2021    FREET4 1.18 01/12/2021         Medication Review: Reviewed.       Current Outpatient Medications:   •  ACCU-CHEK FASTCLIX LANCETS misc, TEST BEFORE MEALS AND AT  BEDTIME, Disp: 102 each, Rfl: 0  •  allopurinol (ZYLOPRIM) 100 MG tablet, Take 100 mg by mouth Every Evening., Disp: , Rfl:   •  amLODIPine (NORVASC) 10 MG tablet, Take 10 mg by mouth Every Morning., Disp: , Rfl:   •  atorvastatin (LIPITOR) 40 MG tablet, , Disp: , Rfl:   •  bimatoprost (LUMIGAN) 0.03 % ophthalmic drops, INSTILL 1 DROP INTO BOTH EYES EVERY NIGHT AT BEDTIME, Disp: , Rfl:   •  Cholecalciferol (Vitamin D3) 50 MCG (2000 UT) capsule, TAKE ONE (1) CAPSULE BY MOUTH DAILY, Disp: , Rfl:   •  Cyanocobalamin (Vitamin B-12) 1000 MCG sublingual tablet, Place 1,000 mcg under the tongue Daily., Disp: 100 each, Rfl: 4  •  docusate sodium (COLACE) 250 MG capsule, Take 1 capsule by mouth 2 (Two) Times a Day As Needed for Constipation., Disp: 30 capsule, Rfl: 1  •  fenofibrate micronized (LOFIBRA) 200 MG capsule, TAKE ONE (1) CAPSULE BY MOUTH EVERY DAY, Disp: , Rfl:   •  ferrous sulfate 324 (65 Fe) MG tablet delayed-release EC tablet, TAKE ONE (1) TABLET BY MOUTH EVERY DAY, Disp: , Rfl:   •  glipizide (GLUCOTROL) 10 MG tablet, Take 10 mg by mouth 2 (Two) Times a Day., Disp: , Rfl:   •  glucose blood test strip, TEST BEFORE MEALS AND AT BEDTIME, Disp: 100 each, Rfl: 0  •  HYDROcodone-acetaminophen (NORCO)  MG per tablet, Take 1 tablet by mouth Every 4 (Four) Hours As Needed for Moderate Pain ., Disp: , Rfl:   •  Insulin Pen Needle (UNIFINE PENTIPS) 31G X 5 MM misc, USE WITH INSULIN, Disp: 100 each, Rfl: 0  •  losartan-hydrochlorothiazide (HYZAAR) 100-25 MG per tablet, Take 1 tablet by mouth Daily., Disp: , Rfl:   •  metFORMIN (GLUCOPHAGE) 500 MG tablet, Take 1 tablet by mouth 2 (Two) Times a Day With Meals., Disp: 60 tablet, Rfl: 6  •  nebivolol (BYSTOLIC) 10 MG tablet, Take 10 mg by mouth Every Morning., Disp: , Rfl:   •  omeprazole (priLOSEC) 40 MG capsule, Take 40 mg by mouth 2 (two) times a day., Disp: , Rfl:   •  pregabalin (LYRICA) 200 MG capsule, TAKE ONE (1) CAPSULE BY MOUTH EVERY DAY, Disp: , Rfl:   •   Semaglutide,0.25 or 0.5MG/DOS, (Ozempic, 0.25 or 0.5 MG/DOSE,) 2 MG/1.5ML solution pen-injector, 0.25 mg subcu weekly for 4 weeks then increase to 0.5 mg subcu weekly if tolerated., Disp: 2 mL, Rfl: 6  •  tiZANidine (ZANAFLEX) 4 MG tablet, Take 4 mg by mouth 2 (Two) Times a Day., Disp: , Rfl:   •  traZODone (DESYREL) 100 MG tablet, Take 100 mg by mouth Every Night. 2 tablet at night, Disp: , Rfl:   •  triamcinolone (KENALOG) 0.5 % cream, Apply  topically to the appropriate area as directed 2 (Two) Times a Day., Disp: , Rfl:       Assessment/Plan   1.  Diabetes mellitus type 2: Blood sugars fair, no labs available at this time.  Will check A1c.  Continue current management including Metformin with Ozempic and glipizide for now.  Encouraged to continue to work on diet and activity and will follow blood sugars and A1c.  Advised to always keep glucose source in case of low blood sugars.  Annual eye exam and flu vaccine.  2.  Hypertension: Well-controlled, continue current medication  3.  Hyperlipidemia: Currently on atorvastatin with fenofibrate, will follow lipid panel  4.  CKD stage III disease: Follows with nephrologist.            Nati Scruggs MD FACE.

## 2021-07-09 NOTE — PATIENT INSTRUCTIONS
Please continue your current medication  Get your labs done today  Always keep glucose source in case of low blood sugar  Annual eye exam and flu vaccine

## 2021-07-23 DIAGNOSIS — Z79.4 TYPE 2 DIABETES MELLITUS WITH HYPERGLYCEMIA, WITH LONG-TERM CURRENT USE OF INSULIN (HCC): Primary | ICD-10-CM

## 2021-07-23 DIAGNOSIS — E11.65 TYPE 2 DIABETES MELLITUS WITH HYPERGLYCEMIA, WITH LONG-TERM CURRENT USE OF INSULIN (HCC): Primary | ICD-10-CM

## 2021-07-23 RX ORDER — SEMAGLUTIDE 1.34 MG/ML
1 INJECTION, SOLUTION SUBCUTANEOUS WEEKLY
Qty: 3 ML | Refills: 11 | Status: SHIPPED | OUTPATIENT
Start: 2021-07-23 | End: 2022-06-14

## 2021-07-28 ENCOUNTER — TELEPHONE (OUTPATIENT)
Dept: ENDOCRINOLOGY | Facility: CLINIC | Age: 71
End: 2021-07-28

## 2021-07-28 NOTE — TELEPHONE ENCOUNTER
PA submitted via [email or web address]    Message from Plan  Available without authorization.    Pharmacy notified to be sure to put 3 mL per 30 days.

## 2021-08-06 ENCOUNTER — HOSPITAL ENCOUNTER (EMERGENCY)
Facility: HOSPITAL | Age: 71
Discharge: HOME | End: 2021-08-06
Attending: EMERGENCY MEDICINE | Admitting: EMERGENCY MEDICINE

## 2021-08-06 ENCOUNTER — APPOINTMENT (OUTPATIENT)
Dept: CT IMAGING | Facility: HOSPITAL | Age: 71
End: 2021-08-06

## 2021-08-06 VITALS
WEIGHT: 240 LBS | SYSTOLIC BLOOD PRESSURE: 155 MMHG | OXYGEN SATURATION: 97 % | HEART RATE: 100 BPM | DIASTOLIC BLOOD PRESSURE: 91 MMHG | RESPIRATION RATE: 16 BRPM | TEMPERATURE: 97 F | HEIGHT: 75 IN | BODY MASS INDEX: 29.84 KG/M2

## 2021-08-06 DIAGNOSIS — K62.89 PROCTITIS: ICD-10-CM

## 2021-08-06 DIAGNOSIS — R33.8 ACUTE URINARY RETENTION: Primary | ICD-10-CM

## 2021-08-06 DIAGNOSIS — N39.0 URINARY TRACT INFECTION WITHOUT HEMATURIA, SITE UNSPECIFIED: ICD-10-CM

## 2021-08-06 LAB
ALBUMIN SERPL-MCNC: 4.3 G/DL (ref 3.5–5.2)
ALBUMIN/GLOB SERPL: 1 G/DL
ALP SERPL-CCNC: 170 U/L (ref 39–117)
ALT SERPL W P-5'-P-CCNC: 20 U/L (ref 1–41)
ANION GAP SERPL CALCULATED.3IONS-SCNC: 10.5 MMOL/L (ref 5–15)
AST SERPL-CCNC: 25 U/L (ref 1–40)
BACTERIA UR QL AUTO: ABNORMAL /HPF
BASOPHILS # BLD AUTO: 0.07 10*3/MM3 (ref 0–0.2)
BASOPHILS NFR BLD AUTO: 0.5 % (ref 0–1.5)
BILIRUB SERPL-MCNC: 0.5 MG/DL (ref 0–1.2)
BILIRUB UR QL STRIP: NEGATIVE
BUN SERPL-MCNC: 30 MG/DL (ref 8–23)
BUN/CREAT SERPL: 13.1 (ref 7–25)
CALCIUM SPEC-SCNC: 10.4 MG/DL (ref 8.6–10.5)
CHLORIDE SERPL-SCNC: 102 MMOL/L (ref 98–107)
CLARITY UR: CLEAR
CO2 SERPL-SCNC: 24.5 MMOL/L (ref 22–29)
COLOR UR: YELLOW
CREAT SERPL-MCNC: 2.29 MG/DL (ref 0.76–1.27)
D-LACTATE SERPL-SCNC: 1.3 MMOL/L (ref 0.5–2)
DEPRECATED RDW RBC AUTO: 54.1 FL (ref 37–54)
EOSINOPHIL # BLD AUTO: 0.02 10*3/MM3 (ref 0–0.4)
EOSINOPHIL NFR BLD AUTO: 0.1 % (ref 0.3–6.2)
ERYTHROCYTE [DISTWIDTH] IN BLOOD BY AUTOMATED COUNT: 17.3 % (ref 12.3–15.4)
GFR SERPL CREATININE-BSD FRML MDRD: 28 ML/MIN/1.73
GLOBULIN UR ELPH-MCNC: 4.3 GM/DL
GLUCOSE SERPL-MCNC: 197 MG/DL (ref 65–99)
GLUCOSE UR STRIP-MCNC: ABNORMAL MG/DL
HCT VFR BLD AUTO: 41.2 % (ref 37.5–51)
HGB BLD-MCNC: 13.8 G/DL (ref 13–17.7)
HGB UR QL STRIP.AUTO: NEGATIVE
HOLD SPECIMEN: NORMAL
HOLD SPECIMEN: NORMAL
HYALINE CASTS UR QL AUTO: ABNORMAL /LPF
IMM GRANULOCYTES # BLD AUTO: 0.06 10*3/MM3 (ref 0–0.05)
IMM GRANULOCYTES NFR BLD AUTO: 0.4 % (ref 0–0.5)
KETONES UR QL STRIP: NEGATIVE
LEUKOCYTE ESTERASE UR QL STRIP.AUTO: ABNORMAL
LIPASE SERPL-CCNC: 22 U/L (ref 13–60)
LYMPHOCYTES # BLD AUTO: 0.83 10*3/MM3 (ref 0.7–3.1)
LYMPHOCYTES NFR BLD AUTO: 5.6 % (ref 19.6–45.3)
MCH RBC QN AUTO: 28.7 PG (ref 26.6–33)
MCHC RBC AUTO-ENTMCNC: 33.5 G/DL (ref 31.5–35.7)
MCV RBC AUTO: 85.7 FL (ref 79–97)
MONOCYTES # BLD AUTO: 0.79 10*3/MM3 (ref 0.1–0.9)
MONOCYTES NFR BLD AUTO: 5.3 % (ref 5–12)
NEUTROPHILS NFR BLD AUTO: 13.1 10*3/MM3 (ref 1.7–7)
NEUTROPHILS NFR BLD AUTO: 88.1 % (ref 42.7–76)
NITRITE UR QL STRIP: NEGATIVE
NRBC BLD AUTO-RTO: 0 /100 WBC (ref 0–0.2)
PH UR STRIP.AUTO: 5.5 [PH] (ref 5–8)
PLATELET # BLD AUTO: 304 10*3/MM3 (ref 140–450)
PMV BLD AUTO: 12 FL (ref 6–12)
POTASSIUM SERPL-SCNC: 4.7 MMOL/L (ref 3.5–5.2)
PROT SERPL-MCNC: 8.6 G/DL (ref 6–8.5)
PROT UR QL STRIP: NEGATIVE
RBC # BLD AUTO: 4.81 10*6/MM3 (ref 4.14–5.8)
RBC # UR: ABNORMAL /HPF
REF LAB TEST METHOD: ABNORMAL
SODIUM SERPL-SCNC: 137 MMOL/L (ref 136–145)
SP GR UR STRIP: 1.01 (ref 1–1.03)
SQUAMOUS #/AREA URNS HPF: ABNORMAL /HPF
UROBILINOGEN UR QL STRIP: ABNORMAL
WBC # BLD AUTO: 14.87 10*3/MM3 (ref 3.4–10.8)
WBC UR QL AUTO: ABNORMAL /HPF
WHOLE BLOOD HOLD SPECIMEN: NORMAL
YEAST URNS QL MICRO: ABNORMAL /HPF

## 2021-08-06 PROCEDURE — 96365 THER/PROPH/DIAG IV INF INIT: CPT

## 2021-08-06 PROCEDURE — 83690 ASSAY OF LIPASE: CPT

## 2021-08-06 PROCEDURE — 25010000002 CEFTRIAXONE PER 250 MG: Performed by: EMERGENCY MEDICINE

## 2021-08-06 PROCEDURE — 87040 BLOOD CULTURE FOR BACTERIA: CPT | Performed by: EMERGENCY MEDICINE

## 2021-08-06 PROCEDURE — 36415 COLL VENOUS BLD VENIPUNCTURE: CPT

## 2021-08-06 PROCEDURE — 83605 ASSAY OF LACTIC ACID: CPT | Performed by: EMERGENCY MEDICINE

## 2021-08-06 PROCEDURE — 51702 INSERT TEMP BLADDER CATH: CPT

## 2021-08-06 PROCEDURE — 99284 EMERGENCY DEPT VISIT MOD MDM: CPT

## 2021-08-06 PROCEDURE — 74176 CT ABD & PELVIS W/O CONTRAST: CPT

## 2021-08-06 PROCEDURE — 63710000001 ONDANSETRON ODT 4 MG TABLET DISPERSIBLE: Performed by: PHYSICIAN ASSISTANT

## 2021-08-06 PROCEDURE — 51798 US URINE CAPACITY MEASURE: CPT

## 2021-08-06 PROCEDURE — 80053 COMPREHEN METABOLIC PANEL: CPT

## 2021-08-06 PROCEDURE — 81001 URINALYSIS AUTO W/SCOPE: CPT | Performed by: EMERGENCY MEDICINE

## 2021-08-06 PROCEDURE — 85025 COMPLETE CBC W/AUTO DIFF WBC: CPT

## 2021-08-06 RX ORDER — TAMSULOSIN HYDROCHLORIDE 0.4 MG/1
1 CAPSULE ORAL DAILY
Qty: 10 CAPSULE | Refills: 0 | Status: SHIPPED | OUTPATIENT
Start: 2021-08-06 | End: 2021-08-16

## 2021-08-06 RX ORDER — CEPHALEXIN 500 MG/1
500 CAPSULE ORAL 4 TIMES DAILY
Qty: 40 CAPSULE | Refills: 0 | Status: SHIPPED | OUTPATIENT
Start: 2021-08-06 | End: 2021-08-06 | Stop reason: SDUPTHER

## 2021-08-06 RX ORDER — CEFTRIAXONE SODIUM 1 G/50ML
1 INJECTION, SOLUTION INTRAVENOUS ONCE
Status: COMPLETED | OUTPATIENT
Start: 2021-08-06 | End: 2021-08-06

## 2021-08-06 RX ORDER — CEPHALEXIN 500 MG/1
500 CAPSULE ORAL 4 TIMES DAILY
Qty: 40 CAPSULE | Refills: 0 | Status: SHIPPED | OUTPATIENT
Start: 2021-08-06 | End: 2021-08-16

## 2021-08-06 RX ORDER — ONDANSETRON 4 MG/1
4 TABLET, ORALLY DISINTEGRATING ORAL ONCE
Status: COMPLETED | OUTPATIENT
Start: 2021-08-06 | End: 2021-08-06

## 2021-08-06 RX ORDER — TAMSULOSIN HYDROCHLORIDE 0.4 MG/1
1 CAPSULE ORAL DAILY
Qty: 10 CAPSULE | Refills: 0 | Status: SHIPPED | OUTPATIENT
Start: 2021-08-06 | End: 2021-08-06 | Stop reason: SDUPTHER

## 2021-08-06 RX ORDER — SODIUM CHLORIDE 0.9 % (FLUSH) 0.9 %
10 SYRINGE (ML) INJECTION AS NEEDED
Status: DISCONTINUED | OUTPATIENT
Start: 2021-08-06 | End: 2021-08-06 | Stop reason: HOSPADM

## 2021-08-06 RX ADMIN — ONDANSETRON 4 MG: 4 TABLET, ORALLY DISINTEGRATING ORAL at 12:51

## 2021-08-06 RX ADMIN — CEFTRIAXONE SODIUM 1 G: 1 INJECTION, SOLUTION INTRAVENOUS at 17:58

## 2021-08-06 NOTE — ED TRIAGE NOTES
Pt comes to ED from home with c/o lower middle abdominal pain. Pt has stage 3 renal disease and has been having trouble urinating since last Sunday along with constipation, and can only urinate when having a bowel movement.  Pt reports that oliguria is worse with constipation and relieved when constipation subsidies. Pt says that there is creamy white discharge produced and the tip of his penis has redness and irritation. Pt said he came to the ED because his nephrologist would not call back and he is in 10/10 pain. Pt is AAO x4 and shows no signs of distress. Pt and RN wore masks during encounter.

## 2021-08-06 NOTE — ED NOTES
Pt reports uncontrollable abdominal pain, diarrhea and vomiting since last night. Pt reports hx of stage 3 kidney disease.      Arleth Gonzales, RN  08/06/21 1024

## 2021-08-06 NOTE — ED NOTES
Bladder scan unable to be preformed prior to pt going to CT scan. After pt returned from CT Dr. Fall viewed pt CT scan and ordered indwelling catheter. Bladder scan no longer needed per Dr. Fall. Dr. Fall does not wish to straight cath pt due to acute urinary retention.      Ben Dotson, RN  08/06/21 7103

## 2021-08-06 NOTE — ED PROVIDER NOTES
EMERGENCY DEPARTMENT ENCOUNTER    Room Number:  22/22  Date of encounter:  8/6/2021  PCP: Zoya Claros PA  Patient Care Team:  Zoya Claros PA as PCP - General (Physician Assistant)   Historian: Patient    HPI:  Chief Complaint: Difficulty with urination  A complete HPI/ROS/PMH/PSH/SH/FH are unobtainable due to: Poor historian    Context: Tomy Manjarrez is a 71 y.o. male who presents to the ED c/o having difficulty with urination with abdominal pain since Sunday.  He reports that on Sunday he was constipated and with straining to use the restroom.  He reports he is having difficulty with urination as well.  He states that he was having burning with urination and white urine.  He states he was finally able to use the bowel movement on Sunday.  He reports he developed the same symptoms yesterday.  He has had persistent difficulty with urination today.  He reports he has had some mild constipation today.  He reports some redness around the tip of his penis.  He denies prior similar episodes.  He reports a history of kidney disease.  He reports he is on chronic stool softeners and has been taking them.    Prior record review: History of CKD stage III.  History of diabetes, hypertension, hyperlipidemia.  Nephrologist is [facility].    PAST MEDICAL HISTORY  Active Ambulatory Problems     Diagnosis Date Noted   • Left renal mass 11/19/2019   • Type 2 diabetes mellitus with hyperglycemia, with long-term current use of insulin (CMS/[facility]) 11/20/2019   • Hypertension 11/20/2019   • Diabetic neuropathy (CMS/[facility]) 11/20/2019   • MARTIN (acute kidney injury) (CMS/[facility]) 11/20/2019   • CKD (chronic kidney disease) stage 3, GFR 30-59 ml/min (CMS/[facility]) 11/20/2019   • Alcohol use 11/20/2019   • Essential hypertension 08/17/2020   • Chronic renal insufficiency, stage III (moderate) (CMS/[facility]) 08/17/2020   • Prostatocystitis 08/17/2020   • Hyperglycemia due to type 2 diabetes mellitus (CMS/[facility])  2020   • Diabetic peripheral neuropathy associated with type 2 diabetes mellitus (CMS/[facility]) 2020   • Stage 3b chronic kidney disease (CMS/[facility]) 2021   • Mixed hyperlipidemia 2021   • Diabetic peripheral neuropathy (CMS/[facility]) 2021   • Stage 3b chronic kidney disease (CMS/[facility]) 2019   • Diabetic peripheral neuropathy (CMS/[facility]) 2019   • Essential hypertension 2019   • Type 2 diabetes mellitus with hyperglycemia, with long-term current use of insulin (CMS/[facility]) 2019     Resolved Ambulatory Problems     Diagnosis Date Noted   • No Resolved Ambulatory Problems     Past Medical History:   Diagnosis Date   • Chronic back pain    • Diabetes mellitus (CMS/[facility])    • GERD (gastroesophageal reflux disease)    • Gout    • Hyperlipidemia    • Left kidney mass    • Neuropathy    • Type 2 diabetes mellitus (CMS/[facility])          PAST SURGICAL HISTORY  Past Surgical History:   Procedure Laterality Date   • CARDIAC CATHETERIZATION     • COLONOSCOPY     • NEPHRECTOMY PARTIAL Left 2019    Procedure: LEFT PARTIAL NEPHRECTOMY X2, INTRAOPERATIVE RENAL ULTRASONOGRAPHY;  Surgeon: Tres Mena MD;  Location: [facility];  Service: Urology   • WISDOM TOOTH EXTRACTION           FAMILY HISTORY  Family History   Problem Relation Age of Onset   • Diabetes Paternal Grandmother    • Diabetes Paternal Grandfather    • Hypertension Maternal Grandmother    • Malig Hyperthermia Neg Hx    • Cancer Neg Hx          SOCIAL HISTORY  Social History     Socioeconomic History   • Marital status: Single     Spouse name: Not on file   • Number of children: Not on file   • Years of education: Not on file   • Highest education level: Not on file   Tobacco Use   • Smoking status: Former Smoker     Years: 1.00     Types: Cigarettes     Quit date:      Years since quittin.6   • Smokeless tobacco: Never Used   Vaping Use   • Vaping Use: Never used   Substance and Sexual Activity   •  Alcohol use: Yes     Alcohol/week: 14.0 - 21.0 standard drinks     Types: 14 - 21 Shots of liquor per week     Comment: NONE SINCE 11-8-19   • Drug use: No   • Sexual activity: Defer         ALLERGIES  Patient has no known allergies.        REVIEW OF SYSTEMS  Review of Systems   Positive abdominal pain, negative nausea, negative vomiting, positive constipation, positive dysuria  All systems reviewed and negative except for those discussed in HPI.       PHYSICAL EXAM    I have reviewed the triage vital signs and nursing notes.    ED Triage Vitals [08/06/21 1025]   Temp Heart Rate Resp BP SpO2   97.9 °F (36.6 °C) 102 15 (!) 191/91 99 %      Temp src Heart Rate Source Patient Position BP Location FiO2 (%)   Tympanic Monitor Sitting Right arm --       Physical Exam  GENERAL: Awake, alert, no acute distress  SKIN: Warm, dry  HENT: Normocephalic, atraumatic  EYES: no scleral icterus  CV: regular rhythm, regular rate  RESPIRATORY: normal effort, lungs clear  ABDOMEN: soft, mild generalized tenderness, nondistended. no balanitis.  No external skin infection around the penis.  MUSCULOSKELETAL: no deformity  NEURO: alert, moves all extremities, follows commands          LAB RESULTS  Recent Results (from the past 24 hour(s))   Comprehensive Metabolic Panel    Collection Time: 08/06/21 12:45 PM    Specimen: Blood   Result Value Ref Range    Glucose 197 (H) 65 - 99 mg/dL    BUN 30 (H) 8 - 23 mg/dL    Creatinine 2.29 (H) 0.76 - 1.27 mg/dL    Sodium 137 136 - 145 mmol/L    Potassium 4.7 3.5 - 5.2 mmol/L    Chloride 102 98 - 107 mmol/L    CO2 24.5 22.0 - 29.0 mmol/L    Calcium 10.4 8.6 - 10.5 mg/dL    Total Protein 8.6 (H) 6.0 - 8.5 g/dL    Albumin 4.30 3.50 - 5.20 g/dL    ALT (SGPT) 20 1 - 41 U/L    AST (SGOT) 25 1 - 40 U/L    Alkaline Phosphatase 170 (H) 39 - 117 U/L    Total Bilirubin 0.5 0.0 - 1.2 mg/dL    eGFR Non African Amer 28 (L) >60 mL/min/1.73    Globulin 4.3 gm/dL    A/G Ratio 1.0 g/dL    BUN/Creatinine Ratio 13.1 7.0 -  25.0    Anion Gap 10.5 5.0 - 15.0 mmol/L   Lipase    Collection Time: 08/06/21 12:45 PM    Specimen: Blood   Result Value Ref Range    Lipase 22 13 - 60 U/L   Green Top (Gel)    Collection Time: 08/06/21 12:45 PM   Result Value Ref Range    Extra Tube Hold for add-ons.    Lavender Top    Collection Time: 08/06/21 12:45 PM   Result Value Ref Range    Extra Tube hold for add-on    Gold Top - SST    Collection Time: 08/06/21 12:45 PM   Result Value Ref Range    Extra Tube Hold for add-ons.    CBC Auto Differential    Collection Time: 08/06/21 12:45 PM    Specimen: Blood   Result Value Ref Range    WBC 14.87 (H) 3.40 - 10.80 10*3/mm3    RBC 4.81 4.14 - 5.80 10*6/mm3    Hemoglobin 13.8 13.0 - 17.7 g/dL    Hematocrit 41.2 37.5 - 51.0 %    MCV 85.7 79.0 - 97.0 fL    MCH 28.7 26.6 - 33.0 pg    MCHC 33.5 31.5 - 35.7 g/dL    RDW 17.3 (H) 12.3 - 15.4 %    RDW-SD 54.1 (H) 37.0 - 54.0 fl    MPV 12.0 6.0 - 12.0 fL    Platelets 304 140 - 450 10*3/mm3    Neutrophil % 88.1 (H) 42.7 - 76.0 %    Lymphocyte % 5.6 (L) 19.6 - 45.3 %    Monocyte % 5.3 5.0 - 12.0 %    Eosinophil % 0.1 (L) 0.3 - 6.2 %    Basophil % 0.5 0.0 - 1.5 %    Immature Grans % 0.4 0.0 - 0.5 %    Neutrophils, Absolute 13.10 (H) 1.70 - 7.00 10*3/mm3    Lymphocytes, Absolute 0.83 0.70 - 3.10 10*3/mm3    Monocytes, Absolute 0.79 0.10 - 0.90 10*3/mm3    Eosinophils, Absolute 0.02 0.00 - 0.40 10*3/mm3    Basophils, Absolute 0.07 0.00 - 0.20 10*3/mm3    Immature Grans, Absolute 0.06 (H) 0.00 - 0.05 10*3/mm3    nRBC 0.0 0.0 - 0.2 /100 WBC   Urinalysis With Microscopic If Indicated (No Culture) - Urine, Catheter    Collection Time: 08/06/21  4:11 PM    Specimen: Urine, Catheter   Result Value Ref Range    Color, UA Yellow Yellow, Straw    Appearance, UA Clear Clear    pH, UA 5.5 5.0 - 8.0    Specific Gravity, UA 1.015 1.005 - 1.030    Glucose,  mg/dL (1+) (A) Negative    Ketones, UA Negative Negative    Bilirubin, UA Negative Negative    Blood, UA Negative Negative     Protein, UA Negative Negative    Leuk Esterase, UA Moderate (2+) (A) Negative    Nitrite, UA Negative Negative    Urobilinogen, UA 0.2 E.U./dL 0.2 - 1.0 E.U./dL   Urinalysis, Microscopic Only - Urine, Catheter    Collection Time: 21  4:11 PM    Specimen: Urine, Catheter   Result Value Ref Range    RBC, UA 0-2 None Seen, 0-2 /HPF    WBC, UA 13-20 (A) None Seen, 0-2 /HPF    Bacteria, UA None Seen None Seen /HPF    Squamous Epithelial Cells, UA None Seen None Seen, 0-2 /HPF    Yeast, UA Large/3+ Budding Yeast None Seen /HPF    Hyaline Casts, UA None Seen None Seen /LPF    Methodology Manual Light Microscopy    Lactic Acid, Plasma    Collection Time: 21  5:52 PM    Specimen: Blood   Result Value Ref Range    Lactate 1.3 0.5 - 2.0 mmol/L       Ordered the above labs and independently reviewed the results.        RADIOLOGY  CT Abdomen Pelvis Without Contrast    Result Date: 2021  CT SCAN OF THE ABDOMEN AND PELVIS WITHOUT CONTRAST  HISTORY: Abdominal pain and nausea and diarrhea. Difficulty urinating.  FINDINGS: The CT scan was performed as an emergency procedure through the abdomen and pelvis without contrast and demonstrates the followin. The lung bases are clear except for a calcified granuloma at the left base. The liver, spleen, pancreas, and both adrenal glands are unremarkable without intravenous contrast. There are multiple very tiny gallstones layering within the gallbladder and no evidence of gallbladder wall thickening. 2. There is borderline aneurysmal enlargement of the infrarenal aorta measuring 2.4 cm. There is no retroperitoneal lymphadenopathy. The large and small bowel loops are normal in caliber. In the pelvis there is a suspicion of some slight wall thickening of the rectal ampulla with some minimal perirectal haziness and potentially related to some minimal changes of proctitis. The remainder of the colon is unremarkable. 3. There are postsurgical changes involving the left kidney  with adjacent surgical clips and some thickening of the perinephric fat planes extending laterally. I suspect this represents chronic postsurgical change. There are several small predominantly exophytic cysts involving the right kidney. There is no evidence of renal obstruction but there is prominent distention of the urinary bladder which measures up to 16.3 cm in height and 16.6 cm in transverse dimension and 15 cm in AP dimension. There is only mild enlargement of the prostate measuring 5.3 cm but the findings do suggest an element of outlet obstruction.      Radiation dose reduction techniques were utilized, including automated exposure control and exposure modulation based on body size.  This report was finalized on 8/6/2021 6:23 PM by Dr. Maicol Lizarraga M.D.        I ordered the above noted radiological studies. Reviewed by me and discussed with radiologist.  See dictation for official radiology interpretation.      PROCEDURES    Procedures      MEDICATIONS GIVEN IN ER    Medications   sodium chloride 0.9 % flush 10 mL (has no administration in time range)   ondansetron ODT (ZOFRAN-ODT) disintegrating tablet 4 mg (4 mg Oral Given 8/6/21 1251)   cefTRIAXone (ROCEPHIN) IVPB 1 g (1 g Intravenous New Bag 8/6/21 7678)         PROGRESS, DATA ANALYSIS, CONSULTS, AND MEDICAL DECISION MAKING    All labs have been independently reviewed by me.  All radiology studies have been reviewed by me and discussed with radiologist dictating the report.   EKG's independently viewed and interpreted by me.  Discussion below represents my analysis of pertinent findings related to patient's condition, differential diagnosis, treatment plan and final disposition.    Differential diagnosis includes but is not limited to UTI, pyelonephritis, urinary obstruction, bladder outlet obstruction, intra-abdominal infection.    ED Course as of Aug 06 1845   Fri Aug 06, 2021   1543 He had to strain here to have a bowel movement.  He has some  bright red blood in his stool and a few external hemorrhoids.  His hemoglobin is 13.8.    [TR]   1543 Hemoglobin: 13.8 [TR]   1556 CAT scan shows markedly enlarged bladder immediately after the patient attempted to urinate and was only able to produce a small amount.  Ordering Sandoval.    [TR]   1657 Speaking with radiologist by phone.  CT shows urinary retention, normal size prostate, thickening of the rectal ampulla concerning for little proctitis.    [TR]   1703 I reviewed work-up and findings with the patient.  He is significantly better than when he arrived.  He reports he feels significantly improved.  He has had Sandoval catheter in the past.  He lives alone but has family in town that can check on him.  Plan to give him antibiotics, send him home on antibiotics and have him follow-up with urology in a week.  He is agreeable to this plan.  We are also going to try to arrange home health.    [TR]   1836 I suspect that his CT changes of proctitis are related to him straining to urinate and defecate and not actually related to infection of his colon.  Given his history of renal insufficiency, plan to place him on Keflex rather than other antibiotics so that we do not worsen his kidney function.  Plan to have him follow-up with urology and keep the Sandoval catheter in place until then.    [TR]      ED Course User Index  [TR] Darron Fall MD           PPE: Both the patient and I wore a surgical mask throughout the entire patient encounter. I wore protective goggles.       AS OF 18:45 EDT VITALS:    BP - 155/91  HR - 100  TEMP - 97 °F (36.1 °C) (Oral)  O2 SATS - 97%        DIAGNOSIS  Final diagnoses:   Acute urinary retention   Urinary tract infection without hematuria, site unspecified   Proctitis         DISPOSITION  ED Disposition     ED Disposition Condition Comment    Discharge Stable                 Darron Fall MD  08/06/21 1837       Darron Fall MD  08/06/21 1845

## 2021-08-06 NOTE — DISCHARGE INSTRUCTIONS
Take your antibiotics as prescribed until they are gone.  Follow-up with urology in 1 week to have your catheter reevaluated.  Call them on Monday for an appointment.  Return here immediately for fever, chills, inability to urinate or defecate.

## 2021-08-06 NOTE — ED NOTES
Pt reports large improvement in his abd pain after catheter insertion.      Ben Dotson, RN  08/06/21 1369

## 2021-08-06 NOTE — ED NOTES
"Attempted to exchange pt cath drainage back with leg bag upon his discharge. Pt refused bag exchange at this time stating \"I just want to go.\" Pt educated on how to exchange leg bag at a later time and leg bag sent with pt. Pt reports using leg bag before in the past without difficulty.      Ben Dotson, RN  08/06/21 1932    "

## 2021-08-06 NOTE — DISCHARGE PLACEMENT REQUEST
"Ron Domingo (71 y.o. Male)     Date of Birth Social Security Number Address Home Phone MRN    1950  [address]  [address]  [facility] [address]  [id number]    [place]ahai Marital Status          [facility] Single       Admission Date Admission Type Admitting Provider Attending Provider Department, Room/Bed    8/6/21 Emergency  Darron Fall MD [facility] Emergency Department, 22/22    Discharge Date Discharge Disposition Discharge Destination                       Attending Provider: Darron Fall MD    Allergies: No Known Allergies    Isolation: None   Infection: None   Code Status: Prior    Ht: 190.5 cm (75\")   Wt: 109 kg (240 lb)    Admission Cmt: None   Principal Problem: None                Active Insurance as of 8/6/2021     Primary Coverage     Payor Plan Insurance Group Employer/Plan Group    HUMANA MEDICARE REPLACEMENT HUMANA MEDICARE REPLACEMENT [id number]     Payor Plan Address Payor Plan Phone Number Payor Plan Fax Number Effective Dates    [address] [address] [phone]  1/1/2018 - None Entered    [facility] [address]       Subscriber Name Subscriber Birth Date Member ID       RON DOMINGO 1950 [id number]                 Emergency Contacts      (Rel.) Home Phone Work Phone Mobile Phone    BELÉN DOMINGO (Daughter) -- -- [phone]    MAHOGANY NOLASCO (Sister) -- -- [phone]              "

## 2021-08-06 NOTE — CASE MANAGEMENT/SOCIAL WORK
Discharge Planning Assessment  [facility]     Patient Name: Tomy Manjarrez  MRN: [id number]  Today's Date: 8/6/2021    Admit Date: 8/6/2021    Discharge Needs Assessment    No documentation.       Discharge Plan     Row Name 08/06/21 9599       Plan    Plan  Spoke with pt re disposition home with F/C. Pt stated tht he has had a F/C in place before and he is comfortable with going home with a catheter in place. Questioned pt if he would like home health. Pt verbalized agreement. Informed Dr. Fall, ER provider. Referral placed. Face sheet verified. Pt stated that what ever home health agency was available would be satisfactory        Continued Care and Services - Admitted Since 8/6/2021    Coordination has not been started for this encounter.         Demographic Summary    No documentation.       Functional Status    No documentation.       Psychosocial    No documentation.       Abuse/Neglect    No documentation.       Legal    No documentation.       Substance Abuse    No documentation.       Patient Forms    No documentation.           Afua Stringer RN

## 2021-08-11 LAB
BACTERIA SPEC AEROBE CULT: NORMAL
BACTERIA SPEC AEROBE CULT: NORMAL

## 2021-09-07 ENCOUNTER — TRANSCRIBE ORDERS (OUTPATIENT)
Dept: PREADMISSION TESTING | Facility: HOSPITAL | Age: 71
End: 2021-09-07

## 2021-09-10 ENCOUNTER — PRE-ADMISSION TESTING (OUTPATIENT)
Dept: PREADMISSION TESTING | Facility: HOSPITAL | Age: 71
End: 2021-09-10

## 2021-09-10 VITALS
HEIGHT: 75 IN | TEMPERATURE: 98.2 F | BODY MASS INDEX: 27.48 KG/M2 | DIASTOLIC BLOOD PRESSURE: 59 MMHG | SYSTOLIC BLOOD PRESSURE: 97 MMHG | WEIGHT: 221 LBS | RESPIRATION RATE: 16 BRPM | OXYGEN SATURATION: 100 % | HEART RATE: 74 BPM

## 2021-09-10 LAB
ANION GAP SERPL CALCULATED.3IONS-SCNC: 7 MMOL/L (ref 5–15)
BUN SERPL-MCNC: 26 MG/DL (ref 8–23)
BUN/CREAT SERPL: 11.5 (ref 7–25)
CALCIUM SPEC-SCNC: 9.5 MG/DL (ref 8.6–10.5)
CHLORIDE SERPL-SCNC: 101 MMOL/L (ref 98–107)
CO2 SERPL-SCNC: 25 MMOL/L (ref 22–29)
CREAT SERPL-MCNC: 2.27 MG/DL (ref 0.76–1.27)
DEPRECATED RDW RBC AUTO: 46.4 FL (ref 37–54)
ERYTHROCYTE [DISTWIDTH] IN BLOOD BY AUTOMATED COUNT: 14.6 % (ref 12.3–15.4)
GFR SERPL CREATININE-BSD FRML MDRD: 29 ML/MIN/1.73
GLUCOSE SERPL-MCNC: 147 MG/DL (ref 65–99)
HCT VFR BLD AUTO: 34.2 % (ref 37.5–51)
HGB BLD-MCNC: 11.5 G/DL (ref 13–17.7)
MCH RBC QN AUTO: 29.4 PG (ref 26.6–33)
MCHC RBC AUTO-ENTMCNC: 33.6 G/DL (ref 31.5–35.7)
MCV RBC AUTO: 87.5 FL (ref 79–97)
PLATELET # BLD AUTO: 314 10*3/MM3 (ref 140–450)
PMV BLD AUTO: 11.3 FL (ref 6–12)
POTASSIUM SERPL-SCNC: 4.7 MMOL/L (ref 3.5–5.2)
QT INTERVAL: 395 MS
RBC # BLD AUTO: 3.91 10*6/MM3 (ref 4.14–5.8)
SODIUM SERPL-SCNC: 133 MMOL/L (ref 136–145)
WBC # BLD AUTO: 8.91 10*3/MM3 (ref 3.4–10.8)

## 2021-09-10 PROCEDURE — 93005 ELECTROCARDIOGRAM TRACING: CPT

## 2021-09-10 PROCEDURE — 80048 BASIC METABOLIC PNL TOTAL CA: CPT

## 2021-09-10 PROCEDURE — 36415 COLL VENOUS BLD VENIPUNCTURE: CPT

## 2021-09-10 PROCEDURE — 85027 COMPLETE CBC AUTOMATED: CPT

## 2021-09-10 PROCEDURE — 93010 ELECTROCARDIOGRAM REPORT: CPT | Performed by: INTERNAL MEDICINE

## 2021-09-10 NOTE — DISCHARGE INSTRUCTIONS
Take the following medications the morning of surgery:  BYSTOLIC AND AMLODIPINE    If you are on prescription narcotic pain medication to control your pain you may also take that medication the morning of surgery.  HYDROCODONE/ACETAMINOPHEN (NORCO)    DRIVE THROUGH COVID TEST ON 09/17 @ 1:30    ARRIVAL FOR SURGERY IS 09/20 @0530        General Instructions:  • Do not eat or drink anything after midnight the night before surgery.    • Patients who avoid smoking, chewing tobacco and alcohol for 4 weeks prior to surgery have a reduced risk of post-operative complications.  Quit smoking as many days before surgery as you can.  • Do not smoke, use chewing tobacco or drink alcohol the day of surgery.   • If applicable bring your C-PAP/ BI-PAP machine.  • Bring any papers given to you in the doctor’s office.  • Wear clean comfortable clothes.  • Do not wear contact lenses, false eyelashes or make-up.  Bring a case for your glasses.   • Bring crutches or walker if applicable.  • Remove all piercings.  Leave jewelry and any other valuables at home.  • Hair extensions with metal clips must be removed prior to surgery.  • The Pre-Admission Testing nurse will instruct you to bring medications if unable to obtain an accurate list in Pre-Admission Testing.            Preventing a Surgical Site Infection:  • For 2 to 3 days before surgery, avoid shaving with a razor because the razor can irritate skin and make it easier to develop an infection.    • Any areas of open skin can increase the risk of a post-operative wound infection by allowing bacteria to enter and travel throughout the body.  Notify your surgeon if you have any skin wounds / rashes even if it is not near the expected surgical site.  The area will need assessed to determine if surgery should be delayed until it is healed.  • The night prior to surgery shower using a fresh bar of anti-bacterial soap (such as Dial) and clean washcloth.  Sleep in a clean bed with clean  clothing.  Do not allow pets to sleep with you.  • Shower on the morning of surgery using a fresh bar of anti-bacterial soap (such as Dial) and clean washcloth.  Dry with a clean towel and dress in clean clothing.  • Ask your surgeon if you will be receiving antibiotics prior to surgery.  • Make sure you, your family, and all healthcare providers clean their hands with soap and water or an alcohol based hand  before caring for you or your wound.    Day of surgery:  Your arrival time is approximately two hours before your scheduled surgery time.  Upon arrival, a Pre-op nurse and Anesthesiologist will review your health history, obtain vital signs, and answer questions you may have.  The only belongings needed at this time will be your home medications and if applicable your C-PAP/BI-PAP machine.  A Pre-op nurse will start an IV and you may receive medication in preparation for surgery, including something to help you relax.      Please be aware that surgery does come with discomfort.  We want to make every effort to control your discomfort so please discuss any uncontrolled symptoms with your nurse.   Your doctor will most likely have prescribed pain medications.      If you are going home after surgery you will receive individualized written care instructions before being discharged.  A responsible adult must drive you to and from the hospital on the day of your surgery and stay with you for 24 hours.  Discharge prescriptions can be filled by the hospital pharmacy during regular pharmacy hours.  If you are having surgery late in the day/evening your prescription may be e-prescribed to your pharmacy.  Please verify your pharmacy hours or chose a 24 hour pharmacy to avoid not having access to your prescription because your pharmacy has closed for the day.    If you are staying overnight following surgery, you will be transported to your hospital room following the recovery period.  [facility]  has all private rooms.    If you have any questions please call Pre-Admission Testing at [phone].  Deductibles and co-payments are collected on the day of service. Please be prepared to pay the required co-pay, deductible or deposit on the day of service as defined by your plan.    Patient Education for Self-Quarantine Process    • Following your COVID testing, we strongly recommend that you wear a mask when you are with other people and practice social distancing.   • Limit your activities to only required outings.  • Wash your hands with soap and water frequently for at least 20 seconds.   • Avoid touching your eyes, nose and mouth with unwashed hands.  • Do not share anything - utensils, drinking glasses, food from the same bowl.   • Sanitize household surfaces daily. Include all high touch areas (door handles, light switches, phones, countertops, etc.)    Call your surgeon immediately if you experience any of the following symptoms:  • Sore Throat  • Shortness of Breath or difficulty breathing  • Cough  • Chills  • Body soreness or muscle pain  • Headache  • Fever  • New loss of taste or smell  • Do not arrive for your surgery ill.  Your procedure will need to be rescheduled to another time.  You will need to call your physician before the day of surgery to avoid any unnecessary exposure to hospital staff as well as other patients.            • Patients who avoid smoking, chewing tobacco and alcohol for 4 weeks prior to surgery have a reduced risk of post-operative complications.  Quit smoking as many days before surgery as you can.  • Do not smoke, use chewing tobacco or drink alcohol the day of surgery.   • If applicable bring your C-PAP/ BI-PAP machine.  • Bring any papers given to you in the doctor’s office.  • Wear clean comfortable clothes.  • Do not wear contact lenses, false eyelashes or make-up.  Bring a case for your glasses.   • Bring crutches or walker if applicable.  • Remove all piercings.   Leave jewelry and any other valuables at home.  • Hair extensions with metal clips must be removed prior to surgery.  • The Pre-Admission Testing nurse will instruct you to bring medications if unable to obtain an accurate list in Pre-Admission Testing.            Preventing a Surgical Site Infection:  • For 2 to 3 days before surgery, avoid shaving with a razor because the razor can irritate skin and make it easier to develop an infection.    • Any areas of open skin can increase the risk of a post-operative wound infection by allowing bacteria to enter and travel throughout the body.  Notify your surgeon if you have any skin wounds / rashes even if it is not near the expected surgical site.  The area will need assessed to determine if surgery should be delayed until it is healed.  • The night prior to surgery shower using a fresh bar of anti-bacterial soap (such as Dial) and clean washcloth.  Sleep in a clean bed with clean clothing.  Do not allow pets to sleep with you.  • Shower on the morning of surgery using a fresh bar of anti-bacterial soap (such as Dial) and clean washcloth.  Dry with a clean towel and dress in clean clothing.  • Ask your surgeon if you will be receiving antibiotics prior to surgery.  • Make sure you, your family, and all healthcare providers clean their hands with soap and water or an alcohol based hand  before caring for you or your wound.    Day of surgery:  Your arrival time is approximately two hours before your scheduled surgery time.  Upon arrival, a Pre-op nurse and Anesthesiologist will review your health history, obtain vital signs, and answer questions you may have.  The only belongings needed at this time will be a list of your home medications and if applicable your C-PAP/BI-PAP machine.  A Pre-op nurse will start an IV and you may receive medication in preparation for surgery, including something to help you relax.     Please be aware that surgery does come with  discomfort.  We want to make every effort to control your discomfort so please discuss any uncontrolled symptoms with your nurse.   Your doctor will most likely have prescribed pain medications.      If you are going home after surgery you will receive individualized written care instructions before being discharged.  A responsible adult must drive you to and from the hospital on the day of your surgery and stay with you for 24 hours.  Discharge prescriptions can be filled by the hospital pharmacy during regular pharmacy hours.  If you are having surgery late in the day/evening your prescription may be e-prescribed to your pharmacy.  Please verify your pharmacy hours or chose a 24 hour pharmacy to avoid not having access to your prescription because your pharmacy has closed for the day.    If you are staying overnight following surgery, you will be transported to your hospital room following the recovery period.  [facility] has all private rooms.    If you have any questions please call Pre-Admission Testing at [phone].  Deductibles and co-payments are collected on the day of service. Please be prepared to pay the required co-pay, deductible or deposit on the day of service as defined by your plan.    Patient Education for Self-Quarantine Process    • Following your COVID testing, we strongly recommend that you wear a mask when you are with other people and practice social distancing.   • Limit your activities to only required outings.  • Wash your hands with soap and water frequently for at least 20 seconds.   • Avoid touching your eyes, nose and mouth with unwashed hands.  • Do not share anything - utensils, drinking glasses, food from the same bowl.   • Sanitize household surfaces daily. Include all high touch areas (door handles, light switches, phones, countertops, etc.)    Call your surgeon immediately if you experience any of the following symptoms:  • Sore Throat  • Shortness of Breath  or difficulty breathing  • Cough  • Chills  • Body soreness or muscle pain  • Headache  • Fever  • New loss of taste or smell  • Do not arrive for your surgery ill.  Your procedure will need to be rescheduled to another time.  You will need to call your physician before the day of surgery to avoid any unnecessary exposure to hospital staff as well as other patients.

## 2021-09-17 ENCOUNTER — LAB (OUTPATIENT)
Dept: LAB | Facility: HOSPITAL | Age: 71
End: 2021-09-17

## 2021-09-17 LAB — SARS-COV-2 ORF1AB RESP QL NAA+PROBE: NOT DETECTED

## 2021-09-17 PROCEDURE — C9803 HOPD COVID-19 SPEC COLLECT: HCPCS

## 2021-09-17 PROCEDURE — U0004 COV-19 TEST NON-CDC HGH THRU: HCPCS

## 2021-09-20 ENCOUNTER — HOSPITAL ENCOUNTER (OUTPATIENT)
Facility: HOSPITAL | Age: 71
Discharge: HOME | End: 2021-09-21
Attending: UROLOGY | Admitting: UROLOGY

## 2021-09-20 ENCOUNTER — ANESTHESIA EVENT (OUTPATIENT)
Dept: PERIOP | Facility: HOSPITAL | Age: 71
End: 2021-09-20

## 2021-09-20 ENCOUNTER — ANESTHESIA (OUTPATIENT)
Dept: PERIOP | Facility: HOSPITAL | Age: 71
End: 2021-09-20

## 2021-09-20 DIAGNOSIS — N40.0 BPH (BENIGN PROSTATIC HYPERPLASIA): ICD-10-CM

## 2021-09-20 DIAGNOSIS — N13.8 BPH WITH URINARY OBSTRUCTION: Primary | ICD-10-CM

## 2021-09-20 DIAGNOSIS — N40.1 BPH WITH URINARY OBSTRUCTION: Primary | ICD-10-CM

## 2021-09-20 LAB
GLUCOSE BLDC GLUCOMTR-MCNC: 143 MG/DL (ref 70–130)
GLUCOSE BLDC GLUCOMTR-MCNC: 156 MG/DL (ref 70–130)

## 2021-09-20 PROCEDURE — 63710000001 SULFAMETHOXAZOLE-TRIMETHOPRIM 400-80 MG TABLET: Performed by: UROLOGY

## 2021-09-20 PROCEDURE — A9270 NON-COVERED ITEM OR SERVICE: HCPCS | Performed by: UROLOGY

## 2021-09-20 PROCEDURE — 63710000001 TRAZODONE 100 MG TABLET: Performed by: UROLOGY

## 2021-09-20 PROCEDURE — 25010000002 ONDANSETRON PER 1 MG: Performed by: NURSE ANESTHETIST, CERTIFIED REGISTERED

## 2021-09-20 PROCEDURE — 25010000002 PIPERACILLIN SOD-TAZOBACTAM PER 1 G: Performed by: UROLOGY

## 2021-09-20 PROCEDURE — 63710000001 ALLOPURINOL 100 MG TABLET: Performed by: UROLOGY

## 2021-09-20 PROCEDURE — 63710000001 ATORVASTATIN 20 MG TABLET: Performed by: UROLOGY

## 2021-09-20 PROCEDURE — 25010000002 PROPOFOL 10 MG/ML EMULSION: Performed by: NURSE ANESTHETIST, CERTIFIED REGISTERED

## 2021-09-20 PROCEDURE — 63710000001 HYDROCODONE-ACETAMINOPHEN 10-325 MG TABLET: Performed by: UROLOGY

## 2021-09-20 PROCEDURE — 63710000001 OPIUM-BELLADONNA 16.2-30 MG SUPPOSITORY: Performed by: UROLOGY

## 2021-09-20 PROCEDURE — 25010000002 PHENYLEPHRINE 10 MG/ML SOLUTION: Performed by: NURSE ANESTHETIST, CERTIFIED REGISTERED

## 2021-09-20 PROCEDURE — 63710000001 OXYCODONE-ACETAMINOPHEN 10-325 MG TABLET: Performed by: NURSE ANESTHETIST, CERTIFIED REGISTERED

## 2021-09-20 PROCEDURE — 82962 GLUCOSE BLOOD TEST: CPT

## 2021-09-20 PROCEDURE — 63710000001 LOSARTAN 50 MG TABLET 90 EACH BOTTLE: Performed by: UROLOGY

## 2021-09-20 PROCEDURE — 25010000002 NEOSTIGMINE 10 MG/10ML SOLUTION: Performed by: NURSE ANESTHETIST, CERTIFIED REGISTERED

## 2021-09-20 PROCEDURE — 63710000001 CHOLECALCIFEROL 25 MCG (1000 UT) TABLET: Performed by: UROLOGY

## 2021-09-20 PROCEDURE — G0378 HOSPITAL OBSERVATION PER HR: HCPCS

## 2021-09-20 PROCEDURE — 63710000001 SENNOSIDES-DOCUSATE 8.6-50 MG TABLET: Performed by: UROLOGY

## 2021-09-20 PROCEDURE — 25010000002 FENTANYL CITRATE (PF) 50 MCG/ML SOLUTION: Performed by: NURSE ANESTHETIST, CERTIFIED REGISTERED

## 2021-09-20 PROCEDURE — 63710000001 HYDROCHLOROTHIAZIDE 25 MG TABLET 1,000 EACH BOTTLE: Performed by: UROLOGY

## 2021-09-20 PROCEDURE — 25010000002 MIDAZOLAM PER 1 MG: Performed by: ANESTHESIOLOGY

## 2021-09-20 PROCEDURE — 88305 TISSUE EXAM BY PATHOLOGIST: CPT | Performed by: UROLOGY

## 2021-09-20 PROCEDURE — 63710000001 PANTOPRAZOLE 40 MG TABLET DELAYED-RELEASE: Performed by: UROLOGY

## 2021-09-20 PROCEDURE — A9270 NON-COVERED ITEM OR SERVICE: HCPCS | Performed by: NURSE ANESTHETIST, CERTIFIED REGISTERED

## 2021-09-20 PROCEDURE — 63710000001 GLIPIZIDE 10 MG TABLET: Performed by: UROLOGY

## 2021-09-20 PROCEDURE — 25010000002 DEXAMETHASONE PER 1 MG: Performed by: NURSE ANESTHETIST, CERTIFIED REGISTERED

## 2021-09-20 PROCEDURE — 63710000001 LATANOPROST 0.005 % SOLUTION 2.5 ML BOTTLE: Performed by: UROLOGY

## 2021-09-20 PROCEDURE — 63710000001 TIZANIDINE 4 MG TABLET: Performed by: UROLOGY

## 2021-09-20 RX ORDER — HYDROCODONE BITARTRATE AND ACETAMINOPHEN 10; 325 MG/1; MG/1
1 TABLET ORAL EVERY 4 HOURS PRN
Status: DISCONTINUED | OUTPATIENT
Start: 2021-09-20 | End: 2021-09-21 | Stop reason: HOSPADM

## 2021-09-20 RX ORDER — FENTANYL CITRATE 50 UG/ML
50 INJECTION, SOLUTION INTRAMUSCULAR; INTRAVENOUS
Status: DISCONTINUED | OUTPATIENT
Start: 2021-09-20 | End: 2021-09-20 | Stop reason: HOSPADM

## 2021-09-20 RX ORDER — MELATONIN
2000 DAILY
Status: DISCONTINUED | OUTPATIENT
Start: 2021-09-20 | End: 2021-09-21 | Stop reason: HOSPADM

## 2021-09-20 RX ORDER — SULFAMETHOXAZOLE AND TRIMETHOPRIM 400; 80 MG/1; MG/1
1 TABLET ORAL EVERY 12 HOURS SCHEDULED
Status: DISCONTINUED | OUTPATIENT
Start: 2021-09-20 | End: 2021-09-21

## 2021-09-20 RX ORDER — LABETALOL HYDROCHLORIDE 5 MG/ML
5 INJECTION, SOLUTION INTRAVENOUS
Status: DISCONTINUED | OUTPATIENT
Start: 2021-09-20 | End: 2021-09-20 | Stop reason: HOSPADM

## 2021-09-20 RX ORDER — PHENYLEPHRINE HYDROCHLORIDE 10 MG/ML
INJECTION INTRAVENOUS AS NEEDED
Status: DISCONTINUED | OUTPATIENT
Start: 2021-09-20 | End: 2021-09-20 | Stop reason: SURG

## 2021-09-20 RX ORDER — HYDRALAZINE HYDROCHLORIDE 20 MG/ML
5 INJECTION INTRAMUSCULAR; INTRAVENOUS
Status: DISCONTINUED | OUTPATIENT
Start: 2021-09-20 | End: 2021-09-20 | Stop reason: HOSPADM

## 2021-09-20 RX ORDER — MIDAZOLAM HYDROCHLORIDE 1 MG/ML
0.5 INJECTION INTRAMUSCULAR; INTRAVENOUS
Status: DISCONTINUED | OUTPATIENT
Start: 2021-09-20 | End: 2021-09-20 | Stop reason: HOSPADM

## 2021-09-20 RX ORDER — AMLODIPINE BESYLATE 10 MG/1
10 TABLET ORAL EVERY MORNING
Status: DISCONTINUED | OUTPATIENT
Start: 2021-09-21 | End: 2021-09-21 | Stop reason: HOSPADM

## 2021-09-20 RX ORDER — FAMOTIDINE 10 MG/ML
20 INJECTION, SOLUTION INTRAVENOUS ONCE
Status: COMPLETED | OUTPATIENT
Start: 2021-09-20 | End: 2021-09-20

## 2021-09-20 RX ORDER — ATROPA BELLADONNA AND OPIUM 16.2; 3 MG/1; MG/1
30 SUPPOSITORY RECTAL EVERY 8 HOURS PRN
Status: DISCONTINUED | OUTPATIENT
Start: 2021-09-20 | End: 2021-09-21

## 2021-09-20 RX ORDER — NEOSTIGMINE METHYLSULFATE 1 MG/ML
INJECTION, SOLUTION INTRAVENOUS AS NEEDED
Status: DISCONTINUED | OUTPATIENT
Start: 2021-09-20 | End: 2021-09-20 | Stop reason: SURG

## 2021-09-20 RX ORDER — GLIPIZIDE 10 MG/1
10 TABLET ORAL 2 TIMES DAILY
Status: DISCONTINUED | OUTPATIENT
Start: 2021-09-20 | End: 2021-09-21 | Stop reason: HOSPADM

## 2021-09-20 RX ORDER — PROPOFOL 10 MG/ML
VIAL (ML) INTRAVENOUS AS NEEDED
Status: DISCONTINUED | OUTPATIENT
Start: 2021-09-20 | End: 2021-09-20 | Stop reason: SURG

## 2021-09-20 RX ORDER — HYDROCODONE BITARTRATE AND ACETAMINOPHEN 7.5; 325 MG/1; MG/1
1 TABLET ORAL ONCE AS NEEDED
Status: DISCONTINUED | OUTPATIENT
Start: 2021-09-20 | End: 2021-09-20 | Stop reason: HOSPADM

## 2021-09-20 RX ORDER — IBUPROFEN 600 MG/1
600 TABLET ORAL ONCE AS NEEDED
Status: DISCONTINUED | OUTPATIENT
Start: 2021-09-20 | End: 2021-09-20 | Stop reason: HOSPADM

## 2021-09-20 RX ORDER — ONDANSETRON 2 MG/ML
INJECTION INTRAMUSCULAR; INTRAVENOUS AS NEEDED
Status: DISCONTINUED | OUTPATIENT
Start: 2021-09-20 | End: 2021-09-20 | Stop reason: SURG

## 2021-09-20 RX ORDER — FENTANYL CITRATE 50 UG/ML
INJECTION, SOLUTION INTRAMUSCULAR; INTRAVENOUS AS NEEDED
Status: DISCONTINUED | OUTPATIENT
Start: 2021-09-20 | End: 2021-09-20 | Stop reason: SURG

## 2021-09-20 RX ORDER — ONDANSETRON 2 MG/ML
4 INJECTION INTRAMUSCULAR; INTRAVENOUS ONCE AS NEEDED
Status: DISCONTINUED | OUTPATIENT
Start: 2021-09-20 | End: 2021-09-20 | Stop reason: HOSPADM

## 2021-09-20 RX ORDER — SODIUM CHLORIDE 0.9 % (FLUSH) 0.9 %
3 SYRINGE (ML) INJECTION EVERY 12 HOURS SCHEDULED
Status: DISCONTINUED | OUTPATIENT
Start: 2021-09-20 | End: 2021-09-20 | Stop reason: HOSPADM

## 2021-09-20 RX ORDER — PROMETHAZINE HYDROCHLORIDE 25 MG/1
25 SUPPOSITORY RECTAL ONCE AS NEEDED
Status: DISCONTINUED | OUTPATIENT
Start: 2021-09-20 | End: 2021-09-20 | Stop reason: HOSPADM

## 2021-09-20 RX ORDER — SODIUM CHLORIDE 0.9 % (FLUSH) 0.9 %
3-10 SYRINGE (ML) INJECTION AS NEEDED
Status: DISCONTINUED | OUTPATIENT
Start: 2021-09-20 | End: 2021-09-20 | Stop reason: HOSPADM

## 2021-09-20 RX ORDER — SODIUM CHLORIDE, SODIUM LACTATE, POTASSIUM CHLORIDE, CALCIUM CHLORIDE 600; 310; 30; 20 MG/100ML; MG/100ML; MG/100ML; MG/100ML
9 INJECTION, SOLUTION INTRAVENOUS CONTINUOUS
Status: DISCONTINUED | OUTPATIENT
Start: 2021-09-20 | End: 2021-09-21

## 2021-09-20 RX ORDER — SODIUM CHLORIDE 9 MG/ML
50 INJECTION, SOLUTION INTRAVENOUS CONTINUOUS
Status: DISCONTINUED | OUTPATIENT
Start: 2021-09-20 | End: 2021-09-21

## 2021-09-20 RX ORDER — ATROPA BELLADONNA AND OPIUM 16.2; 3 MG/1; MG/1
SUPPOSITORY RECTAL AS NEEDED
Status: DISCONTINUED | OUTPATIENT
Start: 2021-09-20 | End: 2021-09-20 | Stop reason: HOSPADM

## 2021-09-20 RX ORDER — EPHEDRINE SULFATE 50 MG/ML
5 INJECTION, SOLUTION INTRAVENOUS ONCE AS NEEDED
Status: DISCONTINUED | OUTPATIENT
Start: 2021-09-20 | End: 2021-09-20 | Stop reason: HOSPADM

## 2021-09-20 RX ORDER — GLYCOPYRROLATE 0.2 MG/ML
INJECTION INTRAMUSCULAR; INTRAVENOUS AS NEEDED
Status: DISCONTINUED | OUTPATIENT
Start: 2021-09-20 | End: 2021-09-20 | Stop reason: SURG

## 2021-09-20 RX ORDER — LIDOCAINE HYDROCHLORIDE 10 MG/ML
0.5 INJECTION, SOLUTION EPIDURAL; INFILTRATION; INTRACAUDAL; PERINEURAL ONCE AS NEEDED
Status: DISCONTINUED | OUTPATIENT
Start: 2021-09-20 | End: 2021-09-20 | Stop reason: HOSPADM

## 2021-09-20 RX ORDER — ALLOPURINOL 100 MG/1
100 TABLET ORAL EVERY EVENING
Status: DISCONTINUED | OUTPATIENT
Start: 2021-09-20 | End: 2021-09-21 | Stop reason: HOSPADM

## 2021-09-20 RX ORDER — TRAZODONE HYDROCHLORIDE 100 MG/1
100 TABLET ORAL NIGHTLY
Status: DISCONTINUED | OUTPATIENT
Start: 2021-09-20 | End: 2021-09-21 | Stop reason: HOSPADM

## 2021-09-20 RX ORDER — PROMETHAZINE HYDROCHLORIDE 25 MG/1
25 TABLET ORAL ONCE AS NEEDED
Status: DISCONTINUED | OUTPATIENT
Start: 2021-09-20 | End: 2021-09-20 | Stop reason: HOSPADM

## 2021-09-20 RX ORDER — SORBITOL 30 G/1000ML
IRRIGANT IRRIGATION AS NEEDED
Status: DISCONTINUED | OUTPATIENT
Start: 2021-09-20 | End: 2021-09-20 | Stop reason: HOSPADM

## 2021-09-20 RX ORDER — MAGNESIUM HYDROXIDE 1200 MG/15ML
LIQUID ORAL AS NEEDED
Status: DISCONTINUED | OUTPATIENT
Start: 2021-09-20 | End: 2021-09-20 | Stop reason: HOSPADM

## 2021-09-20 RX ORDER — ONDANSETRON 2 MG/ML
4 INJECTION INTRAMUSCULAR; INTRAVENOUS EVERY 6 HOURS PRN
Status: DISCONTINUED | OUTPATIENT
Start: 2021-09-20 | End: 2021-09-21

## 2021-09-20 RX ORDER — ONDANSETRON 4 MG/1
4 TABLET, FILM COATED ORAL EVERY 6 HOURS PRN
Status: DISCONTINUED | OUTPATIENT
Start: 2021-09-20 | End: 2021-09-21

## 2021-09-20 RX ORDER — FLUMAZENIL 0.1 MG/ML
0.2 INJECTION INTRAVENOUS AS NEEDED
Status: DISCONTINUED | OUTPATIENT
Start: 2021-09-20 | End: 2021-09-20 | Stop reason: HOSPADM

## 2021-09-20 RX ORDER — LIDOCAINE HYDROCHLORIDE 20 MG/ML
INJECTION, SOLUTION INFILTRATION; PERINEURAL AS NEEDED
Status: DISCONTINUED | OUTPATIENT
Start: 2021-09-20 | End: 2021-09-20 | Stop reason: SURG

## 2021-09-20 RX ORDER — DEXAMETHASONE SODIUM PHOSPHATE 10 MG/ML
INJECTION INTRAMUSCULAR; INTRAVENOUS AS NEEDED
Status: DISCONTINUED | OUTPATIENT
Start: 2021-09-20 | End: 2021-09-20 | Stop reason: SURG

## 2021-09-20 RX ORDER — DIPHENHYDRAMINE HCL 25 MG
25 CAPSULE ORAL
Status: DISCONTINUED | OUTPATIENT
Start: 2021-09-20 | End: 2021-09-20 | Stop reason: HOSPADM

## 2021-09-20 RX ORDER — DIPHENHYDRAMINE HYDROCHLORIDE 50 MG/ML
12.5 INJECTION INTRAMUSCULAR; INTRAVENOUS
Status: DISCONTINUED | OUTPATIENT
Start: 2021-09-20 | End: 2021-09-20 | Stop reason: HOSPADM

## 2021-09-20 RX ORDER — TIZANIDINE 4 MG/1
4 TABLET ORAL NIGHTLY
Status: DISCONTINUED | OUTPATIENT
Start: 2021-09-20 | End: 2021-09-21 | Stop reason: HOSPADM

## 2021-09-20 RX ORDER — NALOXONE HCL 0.4 MG/ML
0.2 VIAL (ML) INJECTION AS NEEDED
Status: DISCONTINUED | OUTPATIENT
Start: 2021-09-20 | End: 2021-09-20 | Stop reason: HOSPADM

## 2021-09-20 RX ORDER — LATANOPROST 50 UG/ML
1 SOLUTION/ DROPS OPHTHALMIC NIGHTLY
Status: DISCONTINUED | OUTPATIENT
Start: 2021-09-20 | End: 2021-09-21 | Stop reason: HOSPADM

## 2021-09-20 RX ORDER — ROCURONIUM BROMIDE 10 MG/ML
INJECTION, SOLUTION INTRAVENOUS AS NEEDED
Status: DISCONTINUED | OUTPATIENT
Start: 2021-09-20 | End: 2021-09-20 | Stop reason: SURG

## 2021-09-20 RX ORDER — PANTOPRAZOLE SODIUM 40 MG/1
40 TABLET, DELAYED RELEASE ORAL EVERY MORNING
Status: DISCONTINUED | OUTPATIENT
Start: 2021-09-20 | End: 2021-09-21 | Stop reason: HOSPADM

## 2021-09-20 RX ORDER — OXYCODONE AND ACETAMINOPHEN 10; 325 MG/1; MG/1
1 TABLET ORAL EVERY 4 HOURS PRN
Status: DISCONTINUED | OUTPATIENT
Start: 2021-09-20 | End: 2021-09-20 | Stop reason: HOSPADM

## 2021-09-20 RX ORDER — EPHEDRINE SULFATE 50 MG/ML
INJECTION, SOLUTION INTRAVENOUS AS NEEDED
Status: DISCONTINUED | OUTPATIENT
Start: 2021-09-20 | End: 2021-09-20 | Stop reason: SURG

## 2021-09-20 RX ORDER — HYDROMORPHONE HYDROCHLORIDE 1 MG/ML
0.5 INJECTION, SOLUTION INTRAMUSCULAR; INTRAVENOUS; SUBCUTANEOUS
Status: DISCONTINUED | OUTPATIENT
Start: 2021-09-20 | End: 2021-09-20 | Stop reason: HOSPADM

## 2021-09-20 RX ORDER — NEBIVOLOL 10 MG/1
10 TABLET ORAL EVERY MORNING
Status: DISCONTINUED | OUTPATIENT
Start: 2021-09-21 | End: 2021-09-21 | Stop reason: HOSPADM

## 2021-09-20 RX ORDER — ATORVASTATIN CALCIUM 20 MG/1
40 TABLET, FILM COATED ORAL NIGHTLY
Status: DISCONTINUED | OUTPATIENT
Start: 2021-09-20 | End: 2021-09-21 | Stop reason: HOSPADM

## 2021-09-20 RX ORDER — AMOXICILLIN 250 MG
2 CAPSULE ORAL 2 TIMES DAILY
Status: DISCONTINUED | OUTPATIENT
Start: 2021-09-20 | End: 2021-09-21 | Stop reason: HOSPADM

## 2021-09-20 RX ADMIN — TAZOBACTAM SODIUM AND PIPERACILLIN SODIUM 3.38 G: 375; 3 INJECTION, SOLUTION INTRAVENOUS at 07:51

## 2021-09-20 RX ADMIN — NEOSTIGMINE METHYLSULFATE 4 MG: 1 INJECTION INTRAVENOUS at 08:50

## 2021-09-20 RX ADMIN — GLIPIZIDE 10 MG: 10 TABLET ORAL at 20:02

## 2021-09-20 RX ADMIN — ONDANSETRON 4 MG: 2 INJECTION INTRAMUSCULAR; INTRAVENOUS at 08:19

## 2021-09-20 RX ADMIN — LOSARTAN POTASSIUM: 50 TABLET, FILM COATED ORAL at 12:38

## 2021-09-20 RX ADMIN — FENTANYL CITRATE 50 MCG: 50 INJECTION INTRAMUSCULAR; INTRAVENOUS at 08:02

## 2021-09-20 RX ADMIN — ROCURONIUM BROMIDE 30 MG: 50 INJECTION INTRAVENOUS at 08:02

## 2021-09-20 RX ADMIN — SULFAMETHOXAZOLE AND TRIMETHOPRIM 1 TABLET: 400; 80 TABLET ORAL at 12:38

## 2021-09-20 RX ADMIN — TRAZODONE HYDROCHLORIDE 100 MG: 100 TABLET ORAL at 20:02

## 2021-09-20 RX ADMIN — DOCUSATE SODIUM 50MG AND SENNOSIDES 8.6MG 2 TABLET: 8.6; 5 TABLET, FILM COATED ORAL at 12:39

## 2021-09-20 RX ADMIN — PHENYLEPHRINE HYDROCHLORIDE 100 MCG: 10 INJECTION, SOLUTION INTRAVENOUS at 08:09

## 2021-09-20 RX ADMIN — PHENYLEPHRINE HYDROCHLORIDE 100 MCG: 10 INJECTION, SOLUTION INTRAVENOUS at 08:15

## 2021-09-20 RX ADMIN — ATORVASTATIN CALCIUM 40 MG: 20 TABLET, FILM COATED ORAL at 20:02

## 2021-09-20 RX ADMIN — SODIUM CHLORIDE, POTASSIUM CHLORIDE, SODIUM LACTATE AND CALCIUM CHLORIDE 9 ML/HR: 600; 310; 30; 20 INJECTION, SOLUTION INTRAVENOUS at 06:12

## 2021-09-20 RX ADMIN — DOCUSATE SODIUM 50MG AND SENNOSIDES 8.6MG 2 TABLET: 8.6; 5 TABLET, FILM COATED ORAL at 20:02

## 2021-09-20 RX ADMIN — HYDROCODONE BITARTRATE AND ACETAMINOPHEN 1 TABLET: 10; 325 TABLET ORAL at 20:02

## 2021-09-20 RX ADMIN — DEXAMETHASONE SODIUM PHOSPHATE 8 MG: 10 INJECTION INTRAMUSCULAR; INTRAVENOUS at 08:19

## 2021-09-20 RX ADMIN — GLIPIZIDE 10 MG: 10 TABLET ORAL at 12:38

## 2021-09-20 RX ADMIN — MIDAZOLAM 0.5 MG: 1 INJECTION INTRAMUSCULAR; INTRAVENOUS at 06:39

## 2021-09-20 RX ADMIN — EPHEDRINE SULFATE 10 MG: 50 INJECTION INTRAVENOUS at 08:25

## 2021-09-20 RX ADMIN — ROCURONIUM BROMIDE 20 MG: 50 INJECTION INTRAVENOUS at 08:16

## 2021-09-20 RX ADMIN — SULFAMETHOXAZOLE AND TRIMETHOPRIM 1 TABLET: 400; 80 TABLET ORAL at 20:02

## 2021-09-20 RX ADMIN — SODIUM CHLORIDE 50 ML/HR: 9 INJECTION, SOLUTION INTRAVENOUS at 12:31

## 2021-09-20 RX ADMIN — ALLOPURINOL 100 MG: 100 TABLET ORAL at 20:02

## 2021-09-20 RX ADMIN — LATANOPROST 1 DROP: 50 SOLUTION OPHTHALMIC at 20:02

## 2021-09-20 RX ADMIN — PANTOPRAZOLE SODIUM 40 MG: 40 TABLET, DELAYED RELEASE ORAL at 12:39

## 2021-09-20 RX ADMIN — PROPOFOL 200 MG: 10 INJECTION, EMULSION INTRAVENOUS at 08:02

## 2021-09-20 RX ADMIN — Medication 2000 UNITS: at 12:38

## 2021-09-20 RX ADMIN — GLYCOPYRROLATE 0.4 MG: 0.2 INJECTION INTRAMUSCULAR; INTRAVENOUS at 08:50

## 2021-09-20 RX ADMIN — TIZANIDINE 4 MG: 4 TABLET ORAL at 20:02

## 2021-09-20 RX ADMIN — FENTANYL CITRATE 50 MCG: 50 INJECTION INTRAMUSCULAR; INTRAVENOUS at 08:39

## 2021-09-20 RX ADMIN — LIDOCAINE HYDROCHLORIDE 80 MG: 20 INJECTION, SOLUTION INFILTRATION; PERINEURAL at 08:02

## 2021-09-20 RX ADMIN — FAMOTIDINE 20 MG: 10 INJECTION INTRAVENOUS at 06:39

## 2021-09-20 RX ADMIN — EPHEDRINE SULFATE 10 MG: 50 INJECTION INTRAVENOUS at 08:18

## 2021-09-20 RX ADMIN — OXYCODONE AND ACETAMINOPHEN 1 TABLET: 325; 10 TABLET ORAL at 09:38

## 2021-09-20 NOTE — PLAN OF CARE
Goal Outcome Evaluation:PT POD0 TURP with continuous irrigation, VSS, afebrile, flushing well with clear to cloudy output. Eating well, no c/o pain. Anticipate DC tomorrow.

## 2021-09-20 NOTE — ANESTHESIA PROCEDURE NOTES
Airway  Urgency: elective    Date/Time: 9/20/2021 8:03 AM  Airway not difficult    General Information and Staff    Patient location during procedure: OR  CRNA: Zoya Paz CRNA    Indications and Patient Condition  Indications for airway management: airway protection    Preoxygenated: yes  Mask difficulty assessment: 2 - vent by mask + OA or adjuvant +/- NMBA    Final Airway Details  Final airway type: endotracheal airway      Successful airway: ETT  Cuffed: yes   Successful intubation technique: direct laryngoscopy  Endotracheal tube insertion site: oral  Blade: Beaver  Blade size: 2  ETT size (mm): 7.5  Cormack-Lehane Classification: grade I - full view of glottis  Placement verified by: chest auscultation and capnometry   Cuff volume (mL): 8  Number of attempts at approach: 1  Assessment: lips, teeth, and gum same as pre-op and atraumatic intubation    Additional Comments  PreO2 with 100% O2;  FeO2 >85%;  sniff position; easy mask ventilation;  Intubated with no difficultly after eyes taped; Appears atraumatic;  Lips and teeth intact as preop condition;  Airway secured. Connected to ventilator.

## 2021-09-20 NOTE — CASE MANAGEMENT/SOCIAL WORK
Discharge Planning Assessment  [facility]     Patient Name: Tomy Manjarrez  MRN: [id number]  Today's Date: 9/20/2021    Admit Date: 9/20/2021    Discharge Needs Assessment     Row Name 09/20/21 1431       Living Environment    Lives With  sibling(s)    Name(s) of Who Lives With Patient  Kimmy Cruz sister    Current Living Arrangements  home/apartment/condo    Primary Care Provided by  self    Provides Primary Care For  no one    Family Caregiver if Needed  child(ayaz), adult    Family Caregiver Names  Elham Manjarrez dt [phone]    Quality of Family Relationships  supportive    Able to Return to Prior Arrangements  yes       Resource/Environmental Concerns    Resource/Environmental Concerns  none    Transportation Concerns  car, none       Transition Planning    Patient/Family Anticipates Transition to  home with family    Patient/Family Anticipated Services at Transition  none    Transportation Anticipated  family or friend will provide       Discharge Needs Assessment    Readmission Within the Last 30 Days  no previous admission in last 30 days    Equipment Currently Used at Home  glucometer;walker, standard    Concerns to be Addressed  no discharge needs identified;denies needs/concerns at this time    Anticipated Changes Related to Illness  none    Provided Post Acute Provider List?  N/A    N/A Provider List Comment  No needs identified    Provided Post Acute Provider Quality & Resource List?  N/A        Discharge Plan     Row Name 09/20/21 1435       Plan    Plan  Home with family support    Patient/Family in Agreement with Plan  yes    Plan Comments  MOON 9/20/2021. Met with patient and son at bedside. Patient granted me permission to speak to him while his son remained in the room. Face sheet verified. Prior to admission patient was independent with all aspects of his ADL’s. He has a walker at home but states he does not use it. He also has a glucometer. Patient [facility] [address] [facility], denies any issues affording or taking his medications. He is agreeable to using [facility]. Discharge plans are to return home with his sister. He denies any additional needs. Family will transport. Will continue to monitor for new or changing discharge needs.        Continued Care and Services - Admitted Since 9/20/2021    Coordination has not been started for this encounter.         Demographic Summary     Row Name 09/20/21 1430       General Information    Admission Type  observation    Arrived From  home    Required Notices Provided  Observation Status Notice    Referral Source  admission list    Reason for Consult  discharge planning    Preferred Language  English     Used During This Interaction  no       Contact Information    Permission Granted to Share Info With  facility  Elham Manjarrez dt [phone]        Functional Status     Row Name 09/20/21 1431       Functional Status    Usual Activity Tolerance  good    Current Activity Tolerance  good       Functional Status, IADL    Medications  independent    Meal Preparation  independent    Housekeeping  independent    Laundry  independent    Shopping  independent       Mental Status    General Appearance WDL  WDL       Mental Status Summary    Recent Changes in Mental Status/Cognitive Functioning  no changes       Employment/    Employment Status  retired        Psychosocial    No documentation.       Abuse/Neglect    No documentation.       Legal    No documentation.       Substance Abuse    No documentation.       Patient Forms    No documentation.           Aurora Brandt RN

## 2021-09-20 NOTE — H&P
FIRST UROLOGY H&P      Patient Identification:  NAME:  Tomy Manjarrez  Age:  71 y.o.   Sex:  male   :  1950   MRN:  [id number]       Chief complaint:  Unable to urinate    History of present illness:      70 yo male with UTI and acute onset of retention.  Cysto - modest BPH, PUL 3 cm, TRUS shows 30 g gland.  On I/O cath now for failed void trial. Had pathnostics last week - now on Augmentin since Friday.  Here for cysto, TURP.    Past medical history:  Past Medical History:   Diagnosis Date   • Arthritis    • BPH (benign prostatic hyperplasia)    • Chronic back pain    • Chronic kidney disease (CKD), stage III (moderate) (CMS/HCC)    • Diabetes mellitus (CMS/HCC)     TYPE 2   • Difficulty urinating     REQUIRING STRAIGHT CATH   • GERD (gastroesophageal reflux disease)    • Gout    • Hyperlipidemia    • Hypertension    • Left kidney mass    • Neuropathy     IN FEET   • Neuropathy in diabetes (CMS/HCC)    • Type 2 diabetes mellitus (CMS/HCC)        Past surgical history:  Past Surgical History:   Procedure Laterality Date   • CARDIAC CATHETERIZATION     • COLONOSCOPY     • NEPHRECTOMY PARTIAL Left 2019    Procedure: LEFT PARTIAL NEPHRECTOMY X2, INTRAOPERATIVE RENAL ULTRASONOGRAPHY;  Surgeon: Tres Mena MD;  Location: [facility];  Service: Urology   • WISDOM TOOTH EXTRACTION         Allergies:  Patient has no known allergies.    Home medications:  Medications Prior to Admission   Medication Sig Dispense Refill Last Dose   • ACCU-CHEK FASTCLIX LANCETS misc TEST BEFORE MEALS AND AT BEDTIME 102 each 0 2021 at Unknown time   • allopurinol (ZYLOPRIM) 100 MG tablet Take 100 mg by mouth Every Evening.   Past Week at Unknown time   • amLODIPine (NORVASC) 10 MG tablet Take 10 mg by mouth Every Morning.   2021 at 0430   • atorvastatin (LIPITOR) 40 MG tablet Take 40 mg by mouth Every Night.   Past Week at Unknown time   • bimatoprost (LUMIGAN) 0.03 % ophthalmic drops  Administer 1 drop to both eyes Every Night.   Past Week at Unknown time   • Cholecalciferol (Vitamin D3) 50 MCG (2000 UT) capsule Take 2,000 Units by mouth Daily. HOLD FOR 1 WEEK PRIOR TO SURGERY   Past Week at Unknown time   • fenofibrate micronized (LOFIBRA) 200 MG capsule Take 200 mg by mouth Every Night.   Past Week at Unknown time   • ferrous sulfate 324 (65 Fe) MG tablet delayed-release EC tablet Take 324 mg by mouth Daily With Breakfast. HOLD FOR SURGERY   Past Week at Unknown time   • glucose blood test strip TEST BEFORE MEALS AND AT BEDTIME 100 each 0 9/20/2021 at Unknown time   • HYDROcodone-acetaminophen (NORCO)  MG per tablet Take 1 tablet by mouth Every 4 (Four) Hours As Needed for Moderate Pain .   9/20/2021 at 0400   • losartan-hydrochlorothiazide (HYZAAR) 100-25 MG per tablet Take 1 tablet by mouth Daily.   Past Week at Unknown time   • nebivolol (BYSTOLIC) 10 MG tablet Take 10 mg by mouth Every Morning.   9/20/2021 at 0430   • omeprazole (priLOSEC) 40 MG capsule Take 40 mg by mouth Every Night.   Past Week at Unknown time   • pregabalin (LYRICA) 200 MG capsule Take 200 mg by mouth.   Past Week at Unknown time   • tiZANidine (ZANAFLEX) 4 MG tablet Take 4 mg by mouth Every Night.   Past Week at Unknown time   • traZODone (DESYREL) 100 MG tablet Take 100 mg by mouth Every Night. 2 tablet at night   Past Week at Unknown time   • triamcinolone (KENALOG) 0.5 % cream Apply 1 application topically to the appropriate area as directed Daily.   Past Week at Unknown time   • glipizide (GLUCOTROL) 10 MG tablet Take 10 mg by mouth 2 (Two) Times a Day. Taking once daily   9/17/2021   • Insulin Pen Needle (UNIFINE PENTIPS) 31G X 5 MM misc USE WITH INSULIN (Patient taking differently: Inject  under the skin into the appropriate area as directed 1 (One) Time Per Week. TAKES ON THURSDAYS) 100 each 0 9/16/2021   • Semaglutide, 1 MG/DOSE, (Ozempic, 1 MG/DOSE,) 4 MG/3ML solution pen-injector Inject 1 mg under the  skin into the appropriate area as directed 1 (One) Time Per Week. 3 mL 11 2021        Hospital medications:  sodium chloride, 3 mL, Intravenous, Q12H      lactated ringers, 9 mL/hr, Last Rate: 9 mL/hr (21 0612)      •  fentanyl  •  lidocaine PF 1%  •  midazolam  •  sodium chloride    Family history:  Family History   Problem Relation Age of Onset   • Diabetes Paternal Grandmother    • Diabetes Paternal Grandfather    • Hypertension Maternal Grandmother    • Cancer Neg Hx    • Malig Hyperthermia Neg Hx        Social history:      Objective:  TMax 24 hours:   Temp (24hrs), Av.8 °F (37.1 °C), Min:98.8 °F (37.1 °C), Max:98.8 °F (37.1 °C)      Vitals Ranges:   Temp:  [98.8 °F (37.1 °C)] 98.8 °F (37.1 °C)  Heart Rate:  [73-77] 73  Resp:  [16] 16  BP: (136)/(67) 136/67    Intake/Output Last 3 shifts:  No intake/output data recorded.     Physical Exam:    General Appearance:    Alert, cooperative, NAD   Back:     No CVA tenderness   Lungs:     Respirations unlabored, no wheezing    Heart:    RRR, intact peripheral pulses   Abdomen:     Soft, NDNT, no masses, no guarding   Skin:   No bleeding, bruising or rashes   Neuro/Psych:   Orientation intact, mood/affect pleasant, no focal findings       Results review:   I reviewed the patient's new clinical results.    Data review:  Lab Results (last 24 hours)     Procedure Component Value Units Date/Time    POC Glucose Once [[id number]]  (Abnormal) Collected: 21    Specimen: Blood Updated: 21 05     Glucose 156 mg/dL      Comment: Meter: [id number] : [id number] Mark GIRON              Imaging:  Imaging Results (Last 24 Hours)     ** No results found for the last 24 hours. **             Assessment:       BPH with urinary obstruction    BPH with retention    Plan:     Cysto, TURP  -RBO explained, to OR  -May be primarily TUIP based on size, appearance  -Increased risk of post op UTI with prior I/O cath requirement and resultant  bacteriuria  -IV Zosyn OCTOR and post op    Tres Mena MD  09/20/21  07:38 EDT     ** ADDENDUM - Pathnostics showed E Coli that is pan-sensitive, Augmentin started 3 days ago.  Will manage post op with Augmentin and continue at home.

## 2021-09-20 NOTE — ANESTHESIA PREPROCEDURE EVALUATION
Anesthesia Evaluation     Patient summary reviewed and Nursing notes reviewed                Airway   Mallampati: III  TM distance: >3 FB  Possible difficult intubation  Dental      Pulmonary    (+) a smoker Former,   Cardiovascular     ECG reviewed  Rhythm: regular  Rate: normal    (+) hypertension, hyperlipidemia,       Neuro/Psych- negative ROS  GI/Hepatic/Renal/Endo    (+)  GERD,  renal disease CRI, diabetes mellitus type 2,     Musculoskeletal     Abdominal    Substance History   (+) alcohol use,      OB/GYN negative ob/gyn ROS         Other   arthritis,                      Anesthesia Plan    ASA 3     general   (Grade 2b airway by history  CMAC available    I have reviewed the patient's history with the patient and the chart, including all pertinent laboratory results and imaging. I have explained the risks of anesthesia including but not limited to dental damage, corneal abrasion, nerve injury, MI, stroke, and death. Questions asked and answered. Anesthetic plan discussed with patient and team as indicated. Patient expressed understanding of the above.  )  intravenous induction     Anesthetic plan, all risks, benefits, and alternatives have been provided, discussed and informed consent has been obtained with: patient.

## 2021-09-20 NOTE — ANESTHESIA POSTPROCEDURE EVALUATION
Patient: Tomy Manjarrez    Procedure Summary     Date: 09/20/21 Room / Location: [facility] OR  / [facility] MAIN OR    Anesthesia Start: 0756 Anesthesia Stop: 0907    Procedure: CYSTOSCOPY TRANSURETHRAL RESECTION OF PROSTATE TRANSURETHRAL INCISION OF PROSTATE (N/A ) Diagnosis:     Surgeons: Tres Mena MD Provider: Tres Geronimo MD    Anesthesia Type: general ASA Status: 3          Anesthesia Type: general    Vitals  Vitals Value Taken Time   /63 09/20/21 0916   Temp 36.5 °C (97.7 °F) 09/20/21 0903   Pulse 56 09/20/21 0922   Resp 20 09/20/21 0915   SpO2 100 % 09/20/21 0922   Vitals shown include unvalidated device data.        Post Anesthesia Care and Evaluation    Patient location during evaluation: PACU  Patient participation: complete - patient participated  Level of consciousness: awake and alert  Pain management: adequate  Airway patency: patent  Anesthetic complications: No anesthetic complications    Cardiovascular status: acceptable  Respiratory status: acceptable  Hydration status: acceptable    Comments: --------------------            09/20/21 0915     --------------------   BP:       119/63     Pulse:      54       Resp:       20       Temp:                SpO2:      100%     --------------------

## 2021-09-20 NOTE — OP NOTE
Operative Report     [facility] OR    Patient: Tomy Manjarrez  Age:      71 y.o.  :     1950  Sex:      male    Medical Record:  [id number]    Date of Operation/Procedure:  2021    Pre-op Diagnosis:   BPH with retention    Post-Op Diagnosis Codes:  same    Pre-operative Diagnosis Free Text:  * No pre-op diagnosis entered *     Name of Operation/Procedure:  Procedure(s) and Anesthesia Type:     * CYSTOSCOPY WITH TRANSURETHRAL RESECTION OF PROSTATE- General    Findings/Complications:  Trilobar hyperplasia, full resection, no complications    Description of procedure:     The patient was taken to the OR and placed under GA in lithotomy position.  He was prepped and draped in sterile fashion.  The 21 Fr rigid cystoscope was placed and pan-cystoscopy was performed.  Trilobar hyperplasia of the prostate was noted.  The UOs were visualized and were safely away from the median lobe.  No bladder tumors seen.  The bladder was left full.  The obturator sheath was then placed into the bladder and the bladder drained.  The CF resectoscope was then placed.  The trigone was marked on each side with cautery to avoid injury.    I elected to use a Archana knife and performed TUIP from the trigone out to the verumontanum, widely opening the bladder neck.  There was residual median and later lobe tissue seen, so I proceeded with full TURP.    Resection was initiated on the median lobe which was fully resected from 5 to 7 o'clock down to the capsule and out to but not past the verumontanum.  Hemostasis was obtained.  I then resected a single trough at the 2 o'clock position and carried it deep to the capsule and out to the verumontanum.  The remained of the left lobe was resected fully then by connecting the two previous resection sites.  All chips were removed and hemostasis was obtained.  An identical resection was performed on the right lateral lobe without difficulty.  Anterior lobe tissue was not prominent and  minimally resected.  Careful hemostasis was obtained and all chips removed.  A 22 Fr 3 way Sandoval was placed on traction and the bladder irrigated repeatedly until the urine was clear.  CBI was initiated in the OR.  The patient was awakened and taken to the RR in good condition.  No complications.    Estimated Blood Loss: minimal    Specimens:  TUR prostate chips    Fluids/Drains:  22 Fr 3 way Sirisha Mena MD  9/20/2021  08:00 EDT

## 2021-09-21 VITALS
HEIGHT: 75 IN | OXYGEN SATURATION: 96 % | DIASTOLIC BLOOD PRESSURE: 56 MMHG | SYSTOLIC BLOOD PRESSURE: 102 MMHG | BODY MASS INDEX: 26.24 KG/M2 | TEMPERATURE: 98.5 F | HEART RATE: 58 BPM | RESPIRATION RATE: 16 BRPM | WEIGHT: 211 LBS

## 2021-09-21 LAB
CYTO UR: NORMAL
LAB AP CASE REPORT: NORMAL
PATH REPORT.FINAL DX SPEC: NORMAL
PATH REPORT.GROSS SPEC: NORMAL

## 2021-09-21 PROCEDURE — 63710000001 GLIPIZIDE 10 MG TABLET: Performed by: UROLOGY

## 2021-09-21 PROCEDURE — A9270 NON-COVERED ITEM OR SERVICE: HCPCS | Performed by: UROLOGY

## 2021-09-21 PROCEDURE — 63710000001 HYDROCODONE-ACETAMINOPHEN 10-325 MG TABLET: Performed by: UROLOGY

## 2021-09-21 PROCEDURE — 63710000001 ALLOPURINOL 100 MG TABLET: Performed by: UROLOGY

## 2021-09-21 PROCEDURE — 63710000001 CHOLECALCIFEROL 25 MCG (1000 UT) TABLET: Performed by: UROLOGY

## 2021-09-21 PROCEDURE — 63710000001 NEBIVOLOL 10 MG TABLET: Performed by: UROLOGY

## 2021-09-21 PROCEDURE — 63710000001 PANTOPRAZOLE 40 MG TABLET DELAYED-RELEASE: Performed by: UROLOGY

## 2021-09-21 PROCEDURE — 63710000001 AMLODIPINE 10 MG TABLET: Performed by: UROLOGY

## 2021-09-21 PROCEDURE — 63710000001 LOSARTAN 50 MG TABLET 90 EACH BOTTLE: Performed by: UROLOGY

## 2021-09-21 PROCEDURE — 63710000001 AMOXICILLIN-CLAVULANATE 875-125 MG TABLET: Performed by: UROLOGY

## 2021-09-21 PROCEDURE — G0378 HOSPITAL OBSERVATION PER HR: HCPCS

## 2021-09-21 PROCEDURE — 63710000001 HYDROCHLOROTHIAZIDE 25 MG TABLET 1,000 EACH BOTTLE: Performed by: UROLOGY

## 2021-09-21 PROCEDURE — 63710000001 SENNOSIDES-DOCUSATE 8.6-50 MG TABLET: Performed by: UROLOGY

## 2021-09-21 RX ORDER — OXYCODONE HYDROCHLORIDE 5 MG/1
5 TABLET ORAL EVERY 8 HOURS PRN
Qty: 10 TABLET | Refills: 0 | Status: SHIPPED | OUTPATIENT
Start: 2021-09-21 | End: 2022-01-12 | Stop reason: HOSPADM

## 2021-09-21 RX ORDER — AMOXICILLIN AND CLAVULANATE POTASSIUM 875; 125 MG/1; MG/1
1 TABLET, FILM COATED ORAL EVERY 12 HOURS SCHEDULED
Qty: 10 TABLET | Refills: 0
Start: 2021-09-21 | End: 2021-09-26

## 2021-09-21 RX ORDER — AMOXICILLIN AND CLAVULANATE POTASSIUM 875; 125 MG/1; MG/1
1 TABLET, FILM COATED ORAL EVERY 12 HOURS SCHEDULED
Status: DISCONTINUED | OUTPATIENT
Start: 2021-09-21 | End: 2021-09-21 | Stop reason: HOSPADM

## 2021-09-21 RX ADMIN — HYDROCODONE BITARTRATE AND ACETAMINOPHEN 1 TABLET: 10; 325 TABLET ORAL at 15:03

## 2021-09-21 RX ADMIN — AMLODIPINE BESYLATE 10 MG: 10 TABLET ORAL at 06:16

## 2021-09-21 RX ADMIN — ALLOPURINOL 100 MG: 100 TABLET ORAL at 17:07

## 2021-09-21 RX ADMIN — AMOXICILLIN AND CLAVULANATE POTASSIUM 1 TABLET: 875; 125 TABLET, FILM COATED ORAL at 09:55

## 2021-09-21 RX ADMIN — DOCUSATE SODIUM 50MG AND SENNOSIDES 8.6MG 2 TABLET: 8.6; 5 TABLET, FILM COATED ORAL at 09:09

## 2021-09-21 RX ADMIN — HYDROCODONE BITARTRATE AND ACETAMINOPHEN 1 TABLET: 10; 325 TABLET ORAL at 02:02

## 2021-09-21 RX ADMIN — GLIPIZIDE 10 MG: 10 TABLET ORAL at 09:09

## 2021-09-21 RX ADMIN — LOSARTAN POTASSIUM: 50 TABLET, FILM COATED ORAL at 09:09

## 2021-09-21 RX ADMIN — PANTOPRAZOLE SODIUM 40 MG: 40 TABLET, DELAYED RELEASE ORAL at 06:16

## 2021-09-21 RX ADMIN — Medication 2000 UNITS: at 09:09

## 2021-09-21 RX ADMIN — HYDROCODONE BITARTRATE AND ACETAMINOPHEN 1 TABLET: 10; 325 TABLET ORAL at 06:15

## 2021-09-21 RX ADMIN — NEBIVOLOL HYDROCHLORIDE 10 MG: 10 TABLET ORAL at 06:16

## 2021-09-21 NOTE — DISCHARGE SUMMARY
Date of admission:  9/20/2021   Date of discharge:  9/21/21   Admitting diagnosis:  BPH with retention  Discharge diagnosis:  same    Procedures:  TURP 9/20/21    Hospital course:      Patient taken to the OR on 9/20/21 for TURP, tolerated well.  Urine clear overnight while weaning CBI.  Clear this AM, Sandoval out for void trial.  Home later today with or without Sandoval.  OV 1-2 weeks.  Rx's sent, he has home antibiotics to finish.  Instructions given.    Discharge medications:       Discharge Medications      Changes to Medications      Instructions Start Date   Unifine Pentips 31G X 5 MM misc  Generic drug: Insulin Pen Needle  What changed:   · how to take this  · when to take this  · additional instructions   USE WITH INSULIN         Continue These Medications      Instructions Start Date   Accu-Chek FastClix Lancets misc   TEST BEFORE MEALS AND AT BEDTIME         ASK your doctor about these medications      Instructions Start Date   Accu-Chek Guide test strip  Generic drug: glucose blood   TEST BEFORE MEALS AND AT BEDTIME      allopurinol 100 MG tablet  Commonly known as: ZYLOPRIM   100 mg, Oral, Every Evening      amLODIPine 10 MG tablet  Commonly known as: NORVASC   10 mg, Oral, Every Morning      atorvastatin 40 MG tablet  Commonly known as: LIPITOR   40 mg, Oral, Nightly      bimatoprost 0.03 % ophthalmic drops  Commonly known as: LUMIGAN   1 drop, Both Eyes, Nightly      Bystolic 10 MG tablet  Generic drug: nebivolol   10 mg, Oral, Every Morning      fenofibrate micronized 200 MG capsule  Commonly known as: LOFIBRA   200 mg, Oral, Nightly      ferrous sulfate 324 (65 Fe) MG tablet delayed-release EC tablet   324 mg, Oral, Daily With Breakfast, HOLD FOR SURGERY      glipizide 10 MG tablet  Commonly known as: GLUCOTROL   10 mg, Oral, 2 Times Daily, Taking once daily      HYDROcodone-acetaminophen  MG per tablet  Commonly known as: NORCO   1 tablet, Oral, Every 4 Hours PRN      losartan-hydrochlorothiazide  100-25 MG per tablet  Commonly known as: HYZAAR   1 tablet, Oral, Daily      omeprazole 40 MG capsule  Commonly known as: priLOSEC   40 mg, Oral, Nightly      Ozempic (1 MG/DOSE) 4 MG/3ML solution pen-injector  Generic drug: Semaglutide (1 MG/DOSE)   1 mg, Subcutaneous, Weekly      pregabalin 200 MG capsule  Commonly known as: LYRICA   Take 200 mg by mouth.      tiZANidine 4 MG tablet  Commonly known as: ZANAFLEX   4 mg, Oral, Nightly      traZODone 100 MG tablet  Commonly known as: DESYREL   100 mg, Oral, Nightly, 2 tablet at night      triamcinolone 0.5 % cream  Commonly known as: KENALOG   1 application, Topical, Daily      Vitamin D3 50 MCG (2000 UT) capsule   2,000 Units, Oral, Daily, HOLD FOR 1 WEEK PRIOR TO SURGERY              ROV:  1-2 weeks

## 2021-09-21 NOTE — PLAN OF CARE
Problem: Adult Inpatient Plan of Care  Goal: Plan of Care Review  Outcome: Ongoing, Progressing  Flowsheets (Taken 9/21/2021 0427)  Progress: no change  Plan of Care Reviewed With: patient  Outcome Summary: Patient was medicated with PRN [place]amplain for pain x2 this shift. CBI infusing with clear output. VSS. No family at bedside. Will continue to monitor vital signs, labs and comfort.   Goal Outcome Evaluation:  Plan of Care Reviewed With: patient        Progress: no change  Outcome Summary: Patient was medicated with PRN [place]plain for pain x2 this shift. CBI infusing with clear output. VSS. No family at bedside. Will continue to monitor vital signs, labs and comfort.

## 2021-11-15 ENCOUNTER — LAB (OUTPATIENT)
Dept: LAB | Facility: HOSPITAL | Age: 71
End: 2021-11-15

## 2021-11-15 ENCOUNTER — HOSPITAL ENCOUNTER (OUTPATIENT)
Dept: MRI IMAGING | Facility: HOSPITAL | Age: 71
Discharge: HOME | End: 2021-11-15

## 2021-11-15 ENCOUNTER — TRANSCRIBE ORDERS (OUTPATIENT)
Dept: ADMINISTRATIVE | Facility: HOSPITAL | Age: 71
End: 2021-11-15

## 2021-11-15 DIAGNOSIS — N40.1 ENLARGED PROSTATE WITH URINARY OBSTRUCTION: Primary | ICD-10-CM

## 2021-11-15 DIAGNOSIS — Z12.5 ENCOUNTER FOR SCREENING FOR MALIGNANT NEOPLASM OF PROSTATE: ICD-10-CM

## 2021-11-15 DIAGNOSIS — Z85.528 PERSONAL HISTORY OF RENAL CANCER: ICD-10-CM

## 2021-11-15 DIAGNOSIS — N28.89 RENAL MASS: ICD-10-CM

## 2021-11-15 DIAGNOSIS — N40.1 ENLARGED PROSTATE WITH URINARY OBSTRUCTION: ICD-10-CM

## 2021-11-15 DIAGNOSIS — N13.8 ENLARGED PROSTATE WITH URINARY OBSTRUCTION: ICD-10-CM

## 2021-11-15 DIAGNOSIS — N13.8 ENLARGED PROSTATE WITH URINARY OBSTRUCTION: Primary | ICD-10-CM

## 2021-11-15 LAB
ALBUMIN SERPL-MCNC: 4 G/DL (ref 3.5–5.2)
ALBUMIN/GLOB SERPL: 1.1 G/DL
ALP SERPL-CCNC: 203 U/L (ref 39–117)
ALT SERPL W P-5'-P-CCNC: 18 U/L (ref 1–41)
ANION GAP SERPL CALCULATED.3IONS-SCNC: 12 MMOL/L (ref 5–15)
AST SERPL-CCNC: 26 U/L (ref 1–40)
BASOPHILS # BLD AUTO: 0.06 10*3/MM3 (ref 0–0.2)
BASOPHILS NFR BLD AUTO: 0.8 % (ref 0–1.5)
BILIRUB SERPL-MCNC: 0.4 MG/DL (ref 0–1.2)
BUN SERPL-MCNC: 26 MG/DL (ref 8–23)
BUN/CREAT SERPL: 15.6 (ref 7–25)
CALCIUM SPEC-SCNC: 9.1 MG/DL (ref 8.6–10.5)
CHLORIDE SERPL-SCNC: 103 MMOL/L (ref 98–107)
CO2 SERPL-SCNC: 24 MMOL/L (ref 22–29)
CREAT BLDA-MCNC: 1.9 MG/DL (ref 0.6–1.3)
CREAT SERPL-MCNC: 1.67 MG/DL (ref 0.76–1.27)
DEPRECATED RDW RBC AUTO: 45.2 FL (ref 37–54)
EOSINOPHIL # BLD AUTO: 0.28 10*3/MM3 (ref 0–0.4)
EOSINOPHIL NFR BLD AUTO: 3.6 % (ref 0.3–6.2)
ERYTHROCYTE [DISTWIDTH] IN BLOOD BY AUTOMATED COUNT: 14.2 % (ref 12.3–15.4)
GFR SERPL CREATININE-BSD FRML MDRD: 49 ML/MIN/1.73
GLOBULIN UR ELPH-MCNC: 3.5 GM/DL
GLUCOSE SERPL-MCNC: 140 MG/DL (ref 65–99)
HCT VFR BLD AUTO: 33.7 % (ref 37.5–51)
HGB BLD-MCNC: 11.5 G/DL (ref 13–17.7)
IMM GRANULOCYTES # BLD AUTO: 0.02 10*3/MM3 (ref 0–0.05)
IMM GRANULOCYTES NFR BLD AUTO: 0.3 % (ref 0–0.5)
LYMPHOCYTES # BLD AUTO: 1.37 10*3/MM3 (ref 0.7–3.1)
LYMPHOCYTES NFR BLD AUTO: 17.5 % (ref 19.6–45.3)
MCH RBC QN AUTO: 29.7 PG (ref 26.6–33)
MCHC RBC AUTO-ENTMCNC: 34.1 G/DL (ref 31.5–35.7)
MCV RBC AUTO: 87.1 FL (ref 79–97)
MONOCYTES # BLD AUTO: 0.62 10*3/MM3 (ref 0.1–0.9)
MONOCYTES NFR BLD AUTO: 7.9 % (ref 5–12)
NEUTROPHILS NFR BLD AUTO: 5.47 10*3/MM3 (ref 1.7–7)
NEUTROPHILS NFR BLD AUTO: 69.9 % (ref 42.7–76)
NRBC BLD AUTO-RTO: 0 /100 WBC (ref 0–0.2)
PLATELET # BLD AUTO: 250 10*3/MM3 (ref 140–450)
PMV BLD AUTO: 11.9 FL (ref 6–12)
POTASSIUM SERPL-SCNC: 4.4 MMOL/L (ref 3.5–5.2)
PROT SERPL-MCNC: 7.5 G/DL (ref 6–8.5)
PSA SERPL-MCNC: 0.11 NG/ML (ref 0–4)
RBC # BLD AUTO: 3.87 10*6/MM3 (ref 4.14–5.8)
SODIUM SERPL-SCNC: 139 MMOL/L (ref 136–145)
WBC # BLD AUTO: 7.82 10*3/MM3 (ref 3.4–10.8)

## 2021-11-15 PROCEDURE — 82565 ASSAY OF CREATININE: CPT

## 2021-11-15 PROCEDURE — A9585 GADOBUTROL INJECTION: HCPCS | Performed by: UROLOGY

## 2021-11-15 PROCEDURE — 85025 COMPLETE CBC W/AUTO DIFF WBC: CPT | Performed by: UROLOGY

## 2021-11-15 PROCEDURE — G0103 PSA SCREENING: HCPCS

## 2021-11-15 PROCEDURE — 0 GADOBUTROL 1 MMOL/ML SOLUTION: Performed by: UROLOGY

## 2021-11-15 PROCEDURE — 36415 COLL VENOUS BLD VENIPUNCTURE: CPT

## 2021-11-15 PROCEDURE — 80053 COMPREHEN METABOLIC PANEL: CPT

## 2021-11-15 PROCEDURE — 74183 MRI ABD W/O CNTR FLWD CNTR: CPT

## 2021-11-15 RX ADMIN — GADOBUTROL 10 ML: 604.72 INJECTION INTRAVENOUS at 16:16

## 2022-01-18 ENCOUNTER — LAB (OUTPATIENT)
Dept: LAB | Facility: HOSPITAL | Age: 72
End: 2022-01-18

## 2022-01-18 ENCOUNTER — OFFICE VISIT (OUTPATIENT)
Dept: ENDOCRINOLOGY | Facility: CLINIC | Age: 72
End: 2022-01-18

## 2022-01-18 VITALS
DIASTOLIC BLOOD PRESSURE: 70 MMHG | WEIGHT: 230 LBS | OXYGEN SATURATION: 97 % | SYSTOLIC BLOOD PRESSURE: 140 MMHG | BODY MASS INDEX: 28.6 KG/M2 | HEIGHT: 75 IN | HEART RATE: 66 BPM | TEMPERATURE: 97.1 F

## 2022-01-18 DIAGNOSIS — Z79.4 TYPE 2 DIABETES MELLITUS WITH HYPERGLYCEMIA, WITH LONG-TERM CURRENT USE OF INSULIN: Primary | ICD-10-CM

## 2022-01-18 DIAGNOSIS — E11.65 TYPE 2 DIABETES MELLITUS WITH HYPERGLYCEMIA, WITH LONG-TERM CURRENT USE OF INSULIN: ICD-10-CM

## 2022-01-18 DIAGNOSIS — I10 ESSENTIAL HYPERTENSION: ICD-10-CM

## 2022-01-18 DIAGNOSIS — E11.42 DIABETIC PERIPHERAL NEUROPATHY: ICD-10-CM

## 2022-01-18 DIAGNOSIS — N18.32 STAGE 3B CHRONIC KIDNEY DISEASE: ICD-10-CM

## 2022-01-18 DIAGNOSIS — Z79.4 TYPE 2 DIABETES MELLITUS WITH HYPERGLYCEMIA, WITH LONG-TERM CURRENT USE OF INSULIN: ICD-10-CM

## 2022-01-18 DIAGNOSIS — E11.65 TYPE 2 DIABETES MELLITUS WITH HYPERGLYCEMIA, WITH LONG-TERM CURRENT USE OF INSULIN: Primary | ICD-10-CM

## 2022-01-18 DIAGNOSIS — E11.42 DIABETIC PERIPHERAL NEUROPATHY ASSOCIATED WITH TYPE 2 DIABETES MELLITUS: ICD-10-CM

## 2022-01-18 DIAGNOSIS — E78.2 MIXED HYPERLIPIDEMIA: ICD-10-CM

## 2022-01-18 LAB
ALBUMIN SERPL-MCNC: 3.5 G/DL (ref 3.5–5.2)
ALBUMIN/GLOB SERPL: 0.9 G/DL
ALP SERPL-CCNC: 201 U/L (ref 39–117)
ALT SERPL W P-5'-P-CCNC: 48 U/L (ref 1–41)
ANION GAP SERPL CALCULATED.3IONS-SCNC: 7.1 MMOL/L (ref 5–15)
AST SERPL-CCNC: 97 U/L (ref 1–40)
BILIRUB SERPL-MCNC: 0.4 MG/DL (ref 0–1.2)
BUN SERPL-MCNC: 34 MG/DL (ref 8–23)
BUN/CREAT SERPL: 14.1 (ref 7–25)
CALCIUM SPEC-SCNC: 9.4 MG/DL (ref 8.6–10.5)
CHLORIDE SERPL-SCNC: 102 MMOL/L (ref 98–107)
CO2 SERPL-SCNC: 26.9 MMOL/L (ref 22–29)
CREAT SERPL-MCNC: 2.41 MG/DL (ref 0.76–1.27)
GFR SERPL CREATININE-BSD FRML MDRD: 32 ML/MIN/1.73
GLOBULIN UR ELPH-MCNC: 3.9 GM/DL
GLUCOSE BLDC GLUCOMTR-MCNC: 166 MG/DL (ref 70–105)
GLUCOSE SERPL-MCNC: 149 MG/DL (ref 65–99)
POTASSIUM SERPL-SCNC: 5.1 MMOL/L (ref 3.5–5.2)
PROT SERPL-MCNC: 7.4 G/DL (ref 6–8.5)
SODIUM SERPL-SCNC: 136 MMOL/L (ref 136–145)

## 2022-01-18 PROCEDURE — 36415 COLL VENOUS BLD VENIPUNCTURE: CPT

## 2022-01-18 PROCEDURE — 82962 GLUCOSE BLOOD TEST: CPT | Performed by: INTERNAL MEDICINE

## 2022-01-18 PROCEDURE — 83036 HEMOGLOBIN GLYCOSYLATED A1C: CPT

## 2022-01-18 PROCEDURE — 80053 COMPREHEN METABOLIC PANEL: CPT

## 2022-01-18 PROCEDURE — 99214 OFFICE O/P EST MOD 30 MIN: CPT | Performed by: INTERNAL MEDICINE

## 2022-01-18 RX ORDER — FENOFIBRATE 160 MG/1
160 TABLET ORAL DAILY
COMMUNITY
Start: 2021-12-23

## 2022-01-18 RX ORDER — FINASTERIDE 5 MG/1
TABLET, FILM COATED ORAL
COMMUNITY
Start: 2021-12-23

## 2022-01-18 RX ORDER — TAMSULOSIN HYDROCHLORIDE 0.4 MG/1
CAPSULE ORAL
COMMUNITY
Start: 2021-12-23

## 2022-01-18 NOTE — PROGRESS NOTES
Endocrine Progress Note Outpatient     Patient Care Team:  Zoya Claros PA as PCP - General (Physician Assistant)  Nati Scruggs MD as Consulting Physician (Endocrinology)  Juan Holbrook MD as Consulting Physician (Nephrology)    Chief Complaint: Follow-up type 2 diabetes          HPI: This is 71-year-old male with history of type 2 diabetes, hypertension, hyperlipidemia, CKD stage III disease is here for follow-up.  For type 2 diabetes: Is currently taking Ozempic 1 mg once a week along with glipizide 10 milligrams twice a day.  He did bring in blood sugar records for review mostly running less than 140.  No low blood sugars noted.    Hypertension: Well-controlled    Hyperlipidemia: Currently taking fenofibrate and atorvastatin.    CKD stage III disease: Follows with nephrologist.    Past Medical History:   Diagnosis Date   • Arthritis    • BPH (benign prostatic hyperplasia)    • Chronic back pain    • Chronic kidney disease (CKD), stage III (moderate) ([facility])    • Diabetes mellitus (HCC)     TYPE 2   • Difficulty urinating     REQUIRING STRAIGHT CATH   • GERD (gastroesophageal reflux disease)    • Gout    • Hyperlipidemia    • Hypertension    • Left kidney mass    • Neuropathy     IN FEET   • Neuropathy in diabetes (HCC)    • Type 2 diabetes mellitus (HCC)        Social History     Socioeconomic History   • Marital status: Single   Tobacco Use   • Smoking status: Former Smoker     Years: 1.00     Types: Cigarettes     Quit date:      Years since quittin.0   • Smokeless tobacco: Never Used   Vaping Use   • Vaping Use: Never used   Substance and Sexual Activity   • Alcohol use: Not Currently     Alcohol/week: 14.0 - 21.0 standard drinks     Types: 14 - 21 Shots of liquor per week   • Drug use: Not Currently   • Sexual activity: Defer       Family History   Problem Relation Age of Onset   • Diabetes Paternal Grandmother    • Diabetes Paternal Grandfather    • Hypertension Maternal Grandmother     • Cancer Neg Hx    • Malig Hyperthermia Neg Hx        No Known Allergies    ROS:   Constitutional:  Denies fatigue, tiredness.    Eyes:  Denies change in visual acuity   HENT:  Denies nasal congestion or sore throat   Respiratory: denies cough, shortness of breath.   Cardiovascular:  denies chest pain, edema   GI:  Denies abdominal pain, nausea, vomiting.   Musculoskeletal:  Denies back pain or joint pain   Integument:  Denies dry skin and rash   Neurologic:  Denies headache, focal weakness or sensory changes   Endocrine:  Denies polyuria or polydipsia   Psychiatric:  Denies depression or anxiety      Vitals:    01/18/22 1057   BP: 140/70   Pulse: 66   Temp: 97.1 °F (36.2 °C)   SpO2: 97%     Body mass index is 28.75 kg/m².     Physical Exam:  GEN: NAD, conversant  EYES: EOMI, PERRL, no conjunctival erythema  NECK: no thyromegaly, full ROM   CV: RRR, no murmurs/rubs/gallops, no peripheral edema  LUNG: CTAB, no wheezes/rales/ronchi  SKIN: no rashes, no acanthosis  MSK: no deformities, full ROM of all extremities  NEURO: no tremors, DTR normal  PSYCH: AOX3, appropriate mood, affect normal  FEET: Has bunion on the left foot with some dry skin.  Good dorsal pedis pulses on the left foot.  Monofilament test was absent on the left foot.    Results Review:     I reviewed the patient's new clinical results.    Lab Results   Component Value Date    HGBA1C 7.2 (H) 07/09/2021    HGBA1C 8.8 (H) 01/12/2021    HGBA1C 5.60 11/20/2019      Lab Results   Component Value Date    GLUCOSE 140 (H) 11/15/2021    BUN 26 (H) 11/15/2021    CREATININE 1.67 (H) 11/15/2021    EGFRIFNONA 29 (L) 09/10/2021    EGFRIFAFRI 49 (L) 11/15/2021    BCR 15.6 11/15/2021    K 4.4 11/15/2021    CO2 24.0 11/15/2021    CALCIUM 9.1 11/15/2021    ALBUMIN 4.00 11/15/2021    LABIL2 1.1 07/01/2020    AST 26 11/15/2021    ALT 18 11/15/2021    CHOL 194 07/09/2021    TRIG 325 (H) 07/09/2021     (H) 07/09/2021    HDL 38 (L) 07/09/2021     Lab Results    Component Value Date    TSH 2.410 01/12/2021    FREET4 1.18 01/12/2021         Medication Review: Reviewed.       Current Outpatient Medications:   •  ACCU-CHEK FASTCLIX LANCETS misc, TEST BEFORE MEALS AND AT BEDTIME, Disp: 102 each, Rfl: 0  •  allopurinol (ZYLOPRIM) 100 MG tablet, Take 100 mg by mouth Every Evening., Disp: , Rfl:   •  amLODIPine (NORVASC) 10 MG tablet, Take 10 mg by mouth Every Morning., Disp: , Rfl:   •  atorvastatin (LIPITOR) 40 MG tablet, Take 40 mg by mouth Every Night., Disp: , Rfl:   •  bimatoprost (LUMIGAN) 0.03 % ophthalmic drops, Administer 1 drop to both eyes Every Night., Disp: , Rfl:   •  Cholecalciferol (Vitamin D3) 50 MCG (2000 UT) capsule, Take 2,000 Units by mouth Daily. HOLD FOR 1 WEEK PRIOR TO SURGERY, Disp: , Rfl:   •  fenofibrate 160 MG tablet, Take 160 mg by mouth Daily., Disp: , Rfl:   •  fenofibrate micronized (LOFIBRA) 200 MG capsule, Take 200 mg by mouth Every Night., Disp: , Rfl:   •  ferrous sulfate 324 (65 Fe) MG tablet delayed-release EC tablet, Take 324 mg by mouth Daily With Breakfast. HOLD FOR SURGERY, Disp: , Rfl:   •  finasteride (PROSCAR) 5 MG tablet, TAKE ONE (1) TABLET BY MOUTH EVERY DAY, Disp: , Rfl:   •  glipizide (GLUCOTROL) 10 MG tablet, Take 10 mg by mouth 2 (Two) Times a Day. Taking once daily, Disp: , Rfl:   •  glucose blood test strip, TEST BEFORE MEALS AND AT BEDTIME, Disp: 100 each, Rfl: 0  •  HYDROcodone-acetaminophen (NORCO)  MG per tablet, Take 1 tablet by mouth Every 4 (Four) Hours As Needed for Moderate Pain ., Disp: , Rfl:   •  Insulin Pen Needle (UNIFINE PENTIPS) 31G X 5 MM misc, USE WITH INSULIN (Patient taking differently: Inject  under the skin into the appropriate area as directed 1 (One) Time Per Week. TAKES ON THURSDAYS), Disp: 100 each, Rfl: 0  •  losartan-hydrochlorothiazide (HYZAAR) 100-25 MG per tablet, Take 1 tablet by mouth Daily., Disp: , Rfl:   •  nebivolol (BYSTOLIC) 10 MG tablet, Take 10 mg by mouth Every Morning., Disp:  , Rfl:   •  omeprazole (priLOSEC) 40 MG capsule, Take 40 mg by mouth Every Night., Disp: , Rfl:   •  pregabalin (LYRICA) 200 MG capsule, Take 200 mg by mouth., Disp: , Rfl:   •  Semaglutide, 1 MG/DOSE, (Ozempic, 1 MG/DOSE,) 4 MG/3ML solution pen-injector, Inject 1 mg under the skin into the appropriate area as directed 1 (One) Time Per Week., Disp: 3 mL, Rfl: 11  •  tamsulosin (FLOMAX) 0.4 MG capsule 24 hr capsule, TAKE ONE (1) CAPSULE BY MOUTH EVERY DAY, Disp: , Rfl:   •  tiZANidine (ZANAFLEX) 4 MG tablet, Take 4 mg by mouth Every Night., Disp: , Rfl:   •  traZODone (DESYREL) 100 MG tablet, Take 100 mg by mouth Every Night. 2 tablet at night, Disp: , Rfl:   •  triamcinolone (KENALOG) 0.5 % cream, Apply 1 application topically to the appropriate area as directed Daily., Disp: , Rfl:       Assessment/Plan   1.  Diabetes mellitus type 2: Blood sugars are doing well, no recent A1c.  Does have some hyperglycemia.  Will check A1c today.  Continue Ozempic with glipizide for now.  Advised to always keep glucose source in case of low blood sugars.  2.  Hypertension: Well-controlled, continue current medication    3.  Hyperlipidemia: Currently on atorvastatin fenofibrate, will follow lipid panel    4.  CKD stage III disease: Follows with nephrology    5.  Diabetic peripheral neuropathy with a bunion on the left foot.  Will benefit from diabetic shoes.            Nati Scruggs MD FACE.

## 2022-01-18 NOTE — PATIENT INSTRUCTIONS
Continue current medications  Always keep glucose source in case of low blood sugar  Annual eye exam and flu vaccine  Labs today

## 2022-01-19 LAB — HBA1C MFR BLD: 6.8 % (ref 3.5–5.6)

## 2022-02-01 RX ORDER — ACETAMINOPHEN 160 MG
TABLET,DISINTEGRATING ORAL
Qty: 30 CAPSULE | Refills: 12 | Status: SHIPPED | OUTPATIENT
Start: 2022-02-01 | End: 2023-01-20

## 2022-06-14 DIAGNOSIS — E11.65 TYPE 2 DIABETES MELLITUS WITH HYPERGLYCEMIA, WITH LONG-TERM CURRENT USE OF INSULIN: ICD-10-CM

## 2022-06-14 DIAGNOSIS — Z79.4 TYPE 2 DIABETES MELLITUS WITH HYPERGLYCEMIA, WITH LONG-TERM CURRENT USE OF INSULIN: ICD-10-CM

## 2022-06-14 RX ORDER — SEMAGLUTIDE 1.34 MG/ML
INJECTION, SOLUTION SUBCUTANEOUS
Qty: 3 ML | Refills: 3 | Status: SHIPPED | OUTPATIENT
Start: 2022-06-14 | End: 2022-08-09

## 2022-07-27 RX ORDER — TERAZOSIN 2 MG/1
2 CAPSULE ORAL
COMMUNITY
Start: 2022-04-19

## 2022-07-27 RX ORDER — CLOTRIMAZOLE 1 %
CREAM (GRAM) TOPICAL
COMMUNITY
Start: 2022-04-26 | End: 2023-03-31 | Stop reason: HOSPADM

## 2022-08-04 ENCOUNTER — OFFICE VISIT (OUTPATIENT)
Dept: ENDOCRINOLOGY | Facility: CLINIC | Age: 72
End: 2022-08-04

## 2022-08-04 ENCOUNTER — LAB (OUTPATIENT)
Dept: LAB | Facility: HOSPITAL | Age: 72
End: 2022-08-04

## 2022-08-04 VITALS
BODY MASS INDEX: 30.21 KG/M2 | HEIGHT: 75 IN | DIASTOLIC BLOOD PRESSURE: 68 MMHG | SYSTOLIC BLOOD PRESSURE: 120 MMHG | OXYGEN SATURATION: 95 % | HEART RATE: 80 BPM | WEIGHT: 243 LBS

## 2022-08-04 DIAGNOSIS — N18.32 STAGE 3B CHRONIC KIDNEY DISEASE: ICD-10-CM

## 2022-08-04 DIAGNOSIS — E11.42 DIABETIC PERIPHERAL NEUROPATHY ASSOCIATED WITH TYPE 2 DIABETES MELLITUS: ICD-10-CM

## 2022-08-04 DIAGNOSIS — E11.65 TYPE 2 DIABETES MELLITUS WITH HYPERGLYCEMIA, WITH LONG-TERM CURRENT USE OF INSULIN: Primary | ICD-10-CM

## 2022-08-04 DIAGNOSIS — Z79.4 TYPE 2 DIABETES MELLITUS WITH HYPERGLYCEMIA, WITH LONG-TERM CURRENT USE OF INSULIN: Primary | ICD-10-CM

## 2022-08-04 DIAGNOSIS — E11.65 TYPE 2 DIABETES MELLITUS WITH HYPERGLYCEMIA, WITH LONG-TERM CURRENT USE OF INSULIN: ICD-10-CM

## 2022-08-04 DIAGNOSIS — Z79.4 TYPE 2 DIABETES MELLITUS WITH HYPERGLYCEMIA, WITH LONG-TERM CURRENT USE OF INSULIN: ICD-10-CM

## 2022-08-04 DIAGNOSIS — E78.2 MIXED HYPERLIPIDEMIA: ICD-10-CM

## 2022-08-04 PROBLEM — C64.9 CLEAR CELL CARCINOMA OF KIDNEY: Status: ACTIVE | Noted: 2022-08-04

## 2022-08-04 LAB
ALBUMIN SERPL-MCNC: 4.1 G/DL (ref 3.5–5.2)
ALBUMIN UR-MCNC: <1.2 MG/DL
ALBUMIN/GLOB SERPL: 1.3 G/DL
ALP SERPL-CCNC: 235 U/L (ref 39–117)
ALT SERPL W P-5'-P-CCNC: 24 U/L (ref 1–41)
ANION GAP SERPL CALCULATED.3IONS-SCNC: 12.4 MMOL/L (ref 5–15)
AST SERPL-CCNC: 25 U/L (ref 1–40)
BILIRUB SERPL-MCNC: 0.4 MG/DL (ref 0–1.2)
BUN SERPL-MCNC: 35 MG/DL (ref 8–23)
BUN/CREAT SERPL: 15.6 (ref 7–25)
CALCIUM SPEC-SCNC: 9.5 MG/DL (ref 8.6–10.5)
CHLORIDE SERPL-SCNC: 98 MMOL/L (ref 98–107)
CHOLEST SERPL-MCNC: 146 MG/DL (ref 0–200)
CO2 SERPL-SCNC: 23.6 MMOL/L (ref 22–29)
CREAT SERPL-MCNC: 2.25 MG/DL (ref 0.76–1.27)
CREAT UR-MCNC: 54 MG/DL
EGFRCR SERPLBLD CKD-EPI 2021: 30 ML/MIN/1.73
GLOBULIN UR ELPH-MCNC: 3.2 GM/DL
GLUCOSE BLDC GLUCOMTR-MCNC: 259 MG/DL (ref 70–105)
GLUCOSE SERPL-MCNC: 273 MG/DL (ref 65–99)
HDLC SERPL-MCNC: 31 MG/DL (ref 40–60)
LDLC SERPL CALC-MCNC: 55 MG/DL (ref 0–100)
LDLC/HDLC SERPL: 1.14 {RATIO}
MICROALBUMIN/CREAT UR: NORMAL MG/G{CREAT}
POTASSIUM SERPL-SCNC: 4.8 MMOL/L (ref 3.5–5.2)
PROT SERPL-MCNC: 7.3 G/DL (ref 6–8.5)
SODIUM SERPL-SCNC: 134 MMOL/L (ref 136–145)
TRIGL SERPL-MCNC: 398 MG/DL (ref 0–150)
VLDLC SERPL-MCNC: 60 MG/DL (ref 5–40)

## 2022-08-04 PROCEDURE — 82962 GLUCOSE BLOOD TEST: CPT | Performed by: INTERNAL MEDICINE

## 2022-08-04 PROCEDURE — 80061 LIPID PANEL: CPT

## 2022-08-04 PROCEDURE — 80053 COMPREHEN METABOLIC PANEL: CPT

## 2022-08-04 PROCEDURE — 82043 UR ALBUMIN QUANTITATIVE: CPT

## 2022-08-04 PROCEDURE — 83036 HEMOGLOBIN GLYCOSYLATED A1C: CPT

## 2022-08-04 PROCEDURE — 82570 ASSAY OF URINE CREATININE: CPT

## 2022-08-04 PROCEDURE — 36415 COLL VENOUS BLD VENIPUNCTURE: CPT

## 2022-08-04 PROCEDURE — 99214 OFFICE O/P EST MOD 30 MIN: CPT | Performed by: INTERNAL MEDICINE

## 2022-08-04 RX ORDER — INSULIN DEGLUDEC INJECTION 100 U/ML
INJECTION, SOLUTION SUBCUTANEOUS
Qty: 5 PEN | Refills: 6 | Status: SHIPPED | OUTPATIENT
Start: 2022-08-04 | End: 2022-08-09 | Stop reason: SDUPTHER

## 2022-08-04 NOTE — PROGRESS NOTES
Endocrine Progress Note Outpatient     Patient Care Team:  Zoya Claros PA as PCP - General (Physician Assistant)  Nati Scruggs MD as Consulting Physician (Endocrinology)  Juan Holbrook MD as Consulting Physician (Nephrology)    Chief Complaint: Follow-up type 2 diabetes    HPI: This is 72-year-old male with history of type 2 diabetes, hypertension, hyperlipidemia, CKD stage III disease is here for follow-up.    For type 2 diabetes: Is currently taking Ozempic 1 mg once a week along with glipizide 10 milligrams twice a day.  He tells me since May 2022 his blood sugars have increased and now usually running about 200.  There is no change that he has done in his diet or activity.  He has not been to start any new medication.    Hypertension: Well-controlled    Hyperlipidemia: Currently taking fenofibrate and atorvastatin.    CKD stage III disease: Follows with nephrologist.    Past Medical History:   Diagnosis Date   • Arthritis    • BPH (benign prostatic hyperplasia)    • Chronic back pain    • Chronic kidney disease (CKD), stage III (moderate) ([facility])    • Diabetes mellitus (HCC)     TYPE 2   • Difficulty urinating     REQUIRING STRAIGHT CATH   • GERD (gastroesophageal reflux disease)    • Gout    • Hyperlipidemia    • Hypertension    • Left kidney mass    • Neuropathy     IN FEET   • Neuropathy in diabetes (HCC)    • Type 2 diabetes mellitus (HCC)        Social History     Socioeconomic History   • Marital status: Single   • Number of children: 2   • Years of education: 14   Tobacco Use   • Smoking status: Former Smoker     Years: 1.00     Types: Cigarettes     Quit date:      Years since quittin.6   • Smokeless tobacco: Never Used   Vaping Use   • Vaping Use: Never used   Substance and Sexual Activity   • Alcohol use: Not Currently     Alcohol/week: 14.0 - 21.0 standard drinks     Types: 14 - 21 Shots of liquor per week   • Drug use: Not Currently   • Sexual activity: Defer       Family  History   Problem Relation Age of Onset   • Diabetes Paternal Grandmother    • Diabetes Paternal Grandfather    • Hypertension Maternal Grandmother    • Cancer Neg Hx    • Malig Hyperthermia Neg Hx        No Known Allergies    ROS:   Constitutional:  Denies fatigue, tiredness.    Eyes:  Denies change in visual acuity   HENT:  Denies nasal congestion or sore throat   Respiratory: denies cough, shortness of breath.   Cardiovascular:  denies chest pain, edema   GI:  Denies abdominal pain, nausea, vomiting.   Musculoskeletal:  Denies back pain or joint pain   Integument:  Denies dry skin and rash   Neurologic:  Denies headache, focal weakness or sensory changes   Endocrine:  Denies polyuria or polydipsia   Psychiatric:  Denies depression or anxiety      Vitals:    08/04/22 1057   BP: 120/68   Pulse: 80   SpO2: 95%     Body mass index is 30.37 kg/m².     Physical Exam:  GEN: NAD, conversant  EYES: EOMI, PERRL, no conjunctival erythema  NECK: no thyromegaly, full ROM   CV: RRR, no murmurs/rubs/gallops, no peripheral edema  LUNG: CTAB, no wheezes/rales/ronchi  SKIN: no rashes, no acanthosis  MSK: no deformities, full ROM of all extremities  NEURO: no tremors, DTR normal  PSYCH: AOX3, appropriate mood, affect normal  FEET: Has bunion on the left foot     Results Review:     I reviewed the patient's new clinical results.    Lab Results   Component Value Date    HGBA1C 6.8 (H) 01/18/2022    HGBA1C 7.2 (H) 07/09/2021    HGBA1C 8.8 (H) 01/12/2021      Lab Results   Component Value Date    GLUCOSE 149 (H) 01/18/2022    BUN 34 (H) 01/18/2022    CREATININE 2.41 (H) 01/18/2022    EGFRIFNONA 29 (L) 09/10/2021    EGFRIFAFRI 32 (L) 01/18/2022    BCR 14.1 01/18/2022    K 5.1 01/18/2022    CO2 26.9 01/18/2022    CALCIUM 9.4 01/18/2022    ALBUMIN 3.50 01/18/2022    LABIL2 1.1 07/01/2020    AST 97 (H) 01/18/2022    ALT 48 (H) 01/18/2022    CHOL 194 07/09/2021    TRIG 325 (H) 07/09/2021     (H) 07/09/2021    HDL 38 (L) 07/09/2021      Lab Results   Component Value Date    TSH 2.410 01/12/2021    FREET4 1.18 01/12/2021         Medication Review: Reviewed.       Current Outpatient Medications:   •  ACCU-CHEK FASTCLIX LANCETS misc, TEST BEFORE MEALS AND AT BEDTIME, Disp: 102 each, Rfl: 0  •  allopurinol (ZYLOPRIM) 100 MG tablet, Take 100 mg by mouth Every Evening., Disp: , Rfl:   •  amLODIPine (NORVASC) 10 MG tablet, Take 10 mg by mouth Every Morning., Disp: , Rfl:   •  atorvastatin (LIPITOR) 40 MG tablet, Take 40 mg by mouth Every Night., Disp: , Rfl:   •  bimatoprost (LUMIGAN) 0.03 % ophthalmic drops, Administer 1 drop to both eyes Every Night., Disp: , Rfl:   •  Cholecalciferol (Vitamin D3) 50 MCG (2000 UT) capsule, TAKE ONE (1) CAPSULE BY MOUTH DAILY, Disp: 30 capsule, Rfl: 12  •  clotrimazole (LOTRIMIN) 1 % cream, APPLY TO THE AFFECTED AREA TOPICALLY TWICE DAILY, Disp: , Rfl:   •  fenofibrate 160 MG tablet, Take 160 mg by mouth Daily., Disp: , Rfl:   •  ferrous sulfate 324 (65 Fe) MG tablet delayed-release EC tablet, Take 324 mg by mouth Daily With Breakfast. HOLD FOR SURGERY, Disp: , Rfl:   •  finasteride (PROSCAR) 5 MG tablet, TAKE ONE (1) TABLET BY MOUTH EVERY DAY, Disp: , Rfl:   •  glipizide (GLUCOTROL) 10 MG tablet, Take 10 mg by mouth 2 (Two) Times a Day. Taking once daily, Disp: , Rfl:   •  glucose blood test strip, TEST BEFORE MEALS AND AT BEDTIME, Disp: 100 each, Rfl: 0  •  HYDROcodone-acetaminophen (NORCO)  MG per tablet, Take 1 tablet by mouth Every 4 (Four) Hours As Needed for Moderate Pain ., Disp: , Rfl:   •  losartan-hydrochlorothiazide (HYZAAR) 100-25 MG per tablet, Take 1 tablet by mouth Daily., Disp: , Rfl:   •  nebivolol (BYSTOLIC) 10 MG tablet, Take 10 mg by mouth Every Morning., Disp: , Rfl:   •  omeprazole (priLOSEC) 40 MG capsule, Take 40 mg by mouth Every Night., Disp: , Rfl:   •  Ozempic, 1 MG/DOSE, 4 MG/3ML solution pen-injector, INJECT 1mg INTO THE SKIN OF THE appropriate AREA EVERY WEEK, Disp: 3 mL, Rfl:  3  •  pregabalin (LYRICA) 200 MG capsule, Take 200 mg by mouth., Disp: , Rfl:   •  tamsulosin (FLOMAX) 0.4 MG capsule 24 hr capsule, TAKE ONE (1) CAPSULE BY MOUTH EVERY DAY, Disp: , Rfl:   •  terazosin (HYTRIN) 2 MG capsule, Take 2 mg by mouth every night at bedtime., Disp: , Rfl:   •  tiZANidine (ZANAFLEX) 4 MG tablet, Take 4 mg by mouth Every Night., Disp: , Rfl:   •  traZODone (DESYREL) 100 MG tablet, Take 100 mg by mouth Every Night. 2 tablet at night, Disp: , Rfl:   •  triamcinolone (KENALOG) 0.5 % cream, Apply 1 application topically to the appropriate area as directed Daily., Disp: , Rfl:       Assessment & Plan   1.  Diabetes mellitus type 2 with hyperglycemia: Uncontrolled with high blood sugars, will add Tresiba 10 units subcu daily and continue Ozempic with glipizide.  Continue to work on diet and activity.  Advised to always keep glucose source in case of low blood sugars.    2.  Hypertension: Well-controlled, continue current medication    3.  Hyperlipidemia: Currently on atorvastatin fenofibrate, will follow lipid panel    4.  CKD stage III disease: Follows with nephrology    5.  Diabetic peripheral neuropathy with a bunion on the left foot.  He is not interested in DM shoes.     Assessment and plan from January 18, 2022 reviewed and updated.          Nati Scruggs MD FACE.

## 2022-08-04 NOTE — PATIENT INSTRUCTIONS
Start Tresiba 10 units subcu daily  Continue other medication  Continue to work on your diet and activity  Annual eye exam  Labs today are next few days.

## 2022-08-05 LAB — HBA1C MFR BLD: 11.5 % (ref 4.8–5.6)

## 2022-08-09 DIAGNOSIS — Z79.4 TYPE 2 DIABETES MELLITUS WITH HYPERGLYCEMIA, WITH LONG-TERM CURRENT USE OF INSULIN: Primary | ICD-10-CM

## 2022-08-09 DIAGNOSIS — E11.65 TYPE 2 DIABETES MELLITUS WITH HYPERGLYCEMIA, WITH LONG-TERM CURRENT USE OF INSULIN: Primary | ICD-10-CM

## 2022-08-09 RX ORDER — INSULIN ASPART 100 [IU]/ML
7 INJECTION, SOLUTION INTRAVENOUS; SUBCUTANEOUS
Qty: 15 ML | Refills: 3 | Status: SHIPPED | OUTPATIENT
Start: 2022-08-09

## 2022-08-09 RX ORDER — INSULIN DEGLUDEC INJECTION 100 U/ML
20 INJECTION, SOLUTION SUBCUTANEOUS NIGHTLY
Qty: 15 ML | Refills: 3 | Status: SHIPPED | OUTPATIENT
Start: 2022-08-09 | End: 2022-10-27 | Stop reason: SDUPTHER

## 2022-10-27 DIAGNOSIS — E11.65 TYPE 2 DIABETES MELLITUS WITH HYPERGLYCEMIA, WITH LONG-TERM CURRENT USE OF INSULIN: ICD-10-CM

## 2022-10-27 DIAGNOSIS — Z79.4 TYPE 2 DIABETES MELLITUS WITH HYPERGLYCEMIA, WITH LONG-TERM CURRENT USE OF INSULIN: ICD-10-CM

## 2022-10-27 RX ORDER — INSULIN DEGLUDEC INJECTION 100 U/ML
20 INJECTION, SOLUTION SUBCUTANEOUS NIGHTLY
Qty: 15 ML | Refills: 3 | Status: SHIPPED | OUTPATIENT
Start: 2022-10-27

## 2022-11-21 ENCOUNTER — TELEPHONE (OUTPATIENT)
Dept: ENDOCRINOLOGY | Facility: CLINIC | Age: 72
End: 2022-11-21

## 2022-11-21 NOTE — TELEPHONE ENCOUNTER
Dr. ALLEN, I spoke with pt and he had stopped taking Ozempic 1 mg dose weeks ago because he didn't think he was supposed to take it anymore. However according to your 8/4/22 MD visit, he was supposed to continue it. We can send a new rx to the pharmacy. Do you want him to start at a lower dose since it has been so long since he has had it, or do you want him to re-start at 1 mg? Thanks!

## 2022-11-21 NOTE — TELEPHONE ENCOUNTER
Pt LVM that his BG has been running between 260-300. BG was 268 at the time of his msg. Attempted to call pt back but had to [facility] to call back Kristina. Need to verify pt's DM meds.

## 2022-11-22 DIAGNOSIS — Z79.4 TYPE 2 DIABETES MELLITUS WITH HYPERGLYCEMIA, WITH LONG-TERM CURRENT USE OF INSULIN: ICD-10-CM

## 2022-11-22 DIAGNOSIS — E11.65 TYPE 2 DIABETES MELLITUS WITH HYPERGLYCEMIA, WITH LONG-TERM CURRENT USE OF INSULIN: ICD-10-CM

## 2022-11-28 RX ORDER — SEMAGLUTIDE 1.34 MG/ML
INJECTION, SOLUTION SUBCUTANEOUS
Qty: 3 ML | Refills: 3 | OUTPATIENT
Start: 2022-11-28

## 2022-12-19 RX ORDER — SEMAGLUTIDE 1.34 MG/ML
INJECTION, SOLUTION SUBCUTANEOUS
Qty: 1.5 ML | Refills: 0 | OUTPATIENT
Start: 2022-12-19

## 2023-01-20 RX ORDER — ACETAMINOPHEN 160 MG
TABLET,DISINTEGRATING ORAL
Qty: 30 CAPSULE | Refills: 12 | Status: SHIPPED | OUTPATIENT
Start: 2023-01-20

## 2023-02-12 DIAGNOSIS — Z79.4 TYPE 2 DIABETES MELLITUS WITH HYPERGLYCEMIA, WITH LONG-TERM CURRENT USE OF INSULIN: Primary | ICD-10-CM

## 2023-02-12 DIAGNOSIS — E11.65 TYPE 2 DIABETES MELLITUS WITH HYPERGLYCEMIA, WITH LONG-TERM CURRENT USE OF INSULIN: Primary | ICD-10-CM

## 2023-02-13 RX ORDER — PEN NEEDLE, DIABETIC 31 G X1/4"
1 NEEDLE, DISPOSABLE MISCELLANEOUS 4 TIMES DAILY
Qty: 400 EACH | Refills: 3 | Status: SHIPPED | OUTPATIENT
Start: 2023-02-13

## 2023-03-15 RX ORDER — BIMATOPROST 0.01 %
DROPS OPHTHALMIC (EYE)
COMMUNITY
Start: 2023-02-09

## 2023-03-15 RX ORDER — PREGABALIN 300 MG/1
CAPSULE ORAL
COMMUNITY
Start: 2023-02-01 | End: 2023-03-31 | Stop reason: HOSPADM

## 2023-03-15 RX ORDER — SEMAGLUTIDE 1.34 MG/ML
INJECTION, SOLUTION SUBCUTANEOUS
COMMUNITY
Start: 2023-03-09

## 2023-03-15 RX ORDER — AMITRIPTYLINE HYDROCHLORIDE 50 MG/1
TABLET, FILM COATED ORAL
COMMUNITY
Start: 2023-01-20

## 2023-03-15 RX ORDER — DULOXETIN HYDROCHLORIDE 60 MG/1
CAPSULE, DELAYED RELEASE ORAL
COMMUNITY
Start: 2023-02-07

## 2023-03-16 ENCOUNTER — DOCUMENTATION (OUTPATIENT)
Dept: ENDOCRINOLOGY | Facility: CLINIC | Age: 73
End: 2023-03-16
Payer: MEDICARE

## 2023-03-16 NOTE — PROGRESS NOTES
Benefits Investigation Summary    Prescription: Renewal     Dispensing pharmacy: [place]    Copay amount: Ozempic-$30.00/30 day  Tresiba- RTS 4/3    PLAN: [employer] Part D  BIN: [id number]  PCN: [id number]  RX GROUP:     Prior Auth and Med Assistance notes: NONE    Rashida Hdz CPhT

## 2023-03-22 ENCOUNTER — APPOINTMENT (OUTPATIENT)
Dept: GENERAL RADIOLOGY | Facility: HOSPITAL | Age: 73
DRG: 854 | End: 2023-03-22
Payer: MEDICARE

## 2023-03-22 ENCOUNTER — HOSPITAL ENCOUNTER (INPATIENT)
Facility: HOSPITAL | Age: 73
LOS: 9 days | Discharge: HOME HEALTH | DRG: 854 | End: 2023-03-31
Attending: EMERGENCY MEDICINE | Admitting: INTERNAL MEDICINE
Payer: MEDICARE

## 2023-03-22 DIAGNOSIS — L08.9 DIABETIC FOOT INFECTION: ICD-10-CM

## 2023-03-22 DIAGNOSIS — L03.116 CELLULITIS OF LEFT FOOT: Primary | ICD-10-CM

## 2023-03-22 DIAGNOSIS — E11.628 DIABETIC FOOT INFECTION: ICD-10-CM

## 2023-03-22 PROBLEM — E87.1 HYPONATREMIA: Status: ACTIVE | Noted: 2023-03-22

## 2023-03-22 LAB
ANION GAP SERPL CALCULATED.3IONS-SCNC: 12 MMOL/L (ref 5–15)
BASOPHILS # BLD MANUAL: 0.31 10*3/MM3 (ref 0–0.2)
BASOPHILS NFR BLD MANUAL: 2 % (ref 0–1.5)
BUN SERPL-MCNC: 49 MG/DL (ref 8–23)
BUN/CREAT SERPL: 15.7 (ref 7–25)
CALCIUM SPEC-SCNC: 8.9 MG/DL (ref 8.6–10.5)
CHLORIDE SERPL-SCNC: 96 MMOL/L (ref 98–107)
CO2 SERPL-SCNC: 23 MMOL/L (ref 22–29)
CREAT SERPL-MCNC: 3.12 MG/DL (ref 0.76–1.27)
CRP SERPL-MCNC: 22.87 MG/DL (ref 0–0.5)
D-LACTATE SERPL-SCNC: 1.2 MMOL/L (ref 0.5–2)
DEPRECATED RDW RBC AUTO: 42.4 FL (ref 37–54)
EGFRCR SERPLBLD CKD-EPI 2021: 20.2 ML/MIN/1.73
EOSINOPHIL # BLD MANUAL: 0.16 10*3/MM3 (ref 0–0.4)
EOSINOPHIL NFR BLD MANUAL: 1 % (ref 0.3–6.2)
ERYTHROCYTE [DISTWIDTH] IN BLOOD BY AUTOMATED COUNT: 13.8 % (ref 12.3–15.4)
ERYTHROCYTE [SEDIMENTATION RATE] IN BLOOD: 67 MM/HR (ref 0–20)
GLUCOSE SERPL-MCNC: 219 MG/DL (ref 65–99)
HCT VFR BLD AUTO: 34 % (ref 37.5–51)
HGB BLD-MCNC: 11.5 G/DL (ref 13–17.7)
LYMPHOCYTES # BLD MANUAL: 1.09 10*3/MM3 (ref 0.7–3.1)
LYMPHOCYTES NFR BLD MANUAL: 3 % (ref 5–12)
MCH RBC QN AUTO: 28.8 PG (ref 26.6–33)
MCHC RBC AUTO-ENTMCNC: 33.8 G/DL (ref 31.5–35.7)
MCV RBC AUTO: 85 FL (ref 79–97)
MONOCYTES # BLD: 0.47 10*3/MM3 (ref 0.1–0.9)
NEUTROPHILS # BLD AUTO: 13.51 10*3/MM3 (ref 1.7–7)
NEUTROPHILS NFR BLD MANUAL: 87 % (ref 42.7–76)
PLAT MORPH BLD: NORMAL
PLATELET # BLD AUTO: 176 10*3/MM3 (ref 140–450)
POTASSIUM SERPL-SCNC: 4.4 MMOL/L (ref 3.5–5.2)
RBC # BLD AUTO: 4 10*6/MM3 (ref 4.14–5.8)
RBC MORPH BLD: NORMAL
SODIUM SERPL-SCNC: 131 MMOL/L (ref 136–145)
VARIANT LYMPHS NFR BLD MANUAL: 7 % (ref 19.6–45.3)
WBC MORPH BLD: NORMAL
WBC NRBC COR # BLD: 15.53 10*3/MM3 (ref 3.4–10.8)

## 2023-03-22 PROCEDURE — 86140 C-REACTIVE PROTEIN: CPT | Performed by: EMERGENCY MEDICINE

## 2023-03-22 PROCEDURE — 73630 X-RAY EXAM OF FOOT: CPT

## 2023-03-22 PROCEDURE — 99285 EMERGENCY DEPT VISIT HI MDM: CPT

## 2023-03-22 PROCEDURE — 85007 BL SMEAR W/DIFF WBC COUNT: CPT | Performed by: EMERGENCY MEDICINE

## 2023-03-22 PROCEDURE — 25010000002 VANCOMYCIN 10 G RECONSTITUTED SOLUTION: Performed by: EMERGENCY MEDICINE

## 2023-03-22 PROCEDURE — 83605 ASSAY OF LACTIC ACID: CPT | Performed by: EMERGENCY MEDICINE

## 2023-03-22 PROCEDURE — 87040 BLOOD CULTURE FOR BACTERIA: CPT | Performed by: EMERGENCY MEDICINE

## 2023-03-22 PROCEDURE — 85025 COMPLETE CBC W/AUTO DIFF WBC: CPT | Performed by: EMERGENCY MEDICINE

## 2023-03-22 PROCEDURE — 84145 PROCALCITONIN (PCT): CPT | Performed by: NURSE PRACTITIONER

## 2023-03-22 PROCEDURE — 80053 COMPREHEN METABOLIC PANEL: CPT | Performed by: EMERGENCY MEDICINE

## 2023-03-22 PROCEDURE — 25010000002 PIPERACILLIN SOD-TAZOBACTAM PER 1 G: Performed by: EMERGENCY MEDICINE

## 2023-03-22 PROCEDURE — 99284 EMERGENCY DEPT VISIT MOD MDM: CPT

## 2023-03-22 PROCEDURE — 85652 RBC SED RATE AUTOMATED: CPT | Performed by: EMERGENCY MEDICINE

## 2023-03-22 RX ORDER — SODIUM CHLORIDE 0.9 % (FLUSH) 0.9 %
10 SYRINGE (ML) INJECTION AS NEEDED
Status: DISCONTINUED | OUTPATIENT
Start: 2023-03-22 | End: 2023-03-31 | Stop reason: HOSPADM

## 2023-03-22 RX ORDER — NICOTINE POLACRILEX 4 MG
15 LOZENGE BUCCAL
Status: DISCONTINUED | OUTPATIENT
Start: 2023-03-22 | End: 2023-03-31 | Stop reason: HOSPADM

## 2023-03-22 RX ORDER — ONDANSETRON 2 MG/ML
4 INJECTION INTRAMUSCULAR; INTRAVENOUS EVERY 6 HOURS PRN
Status: DISCONTINUED | OUTPATIENT
Start: 2023-03-22 | End: 2023-03-28 | Stop reason: SDUPTHER

## 2023-03-22 RX ORDER — ENOXAPARIN SODIUM 100 MG/ML
30 INJECTION SUBCUTANEOUS DAILY
Status: DISCONTINUED | OUTPATIENT
Start: 2023-03-23 | End: 2023-03-22

## 2023-03-22 RX ORDER — SODIUM CHLORIDE 9 MG/ML
100 INJECTION, SOLUTION INTRAVENOUS CONTINUOUS
Status: DISCONTINUED | OUTPATIENT
Start: 2023-03-23 | End: 2023-03-29

## 2023-03-22 RX ORDER — SODIUM CHLORIDE 9 MG/ML
40 INJECTION, SOLUTION INTRAVENOUS AS NEEDED
Status: DISCONTINUED | OUTPATIENT
Start: 2023-03-22 | End: 2023-03-31 | Stop reason: HOSPADM

## 2023-03-22 RX ORDER — ONDANSETRON 4 MG/1
4 TABLET, FILM COATED ORAL EVERY 6 HOURS PRN
Status: DISCONTINUED | OUTPATIENT
Start: 2023-03-22 | End: 2023-03-28 | Stop reason: SDUPTHER

## 2023-03-22 RX ORDER — NITROGLYCERIN 0.4 MG/1
0.4 TABLET SUBLINGUAL
Status: DISCONTINUED | OUTPATIENT
Start: 2023-03-22 | End: 2023-03-28 | Stop reason: SDUPTHER

## 2023-03-22 RX ORDER — DEXTROSE MONOHYDRATE 25 G/50ML
25 INJECTION, SOLUTION INTRAVENOUS
Status: DISCONTINUED | OUTPATIENT
Start: 2023-03-22 | End: 2023-03-31 | Stop reason: HOSPADM

## 2023-03-22 RX ORDER — INSULIN LISPRO 100 [IU]/ML
0-14 INJECTION, SOLUTION INTRAVENOUS; SUBCUTANEOUS
Status: DISCONTINUED | OUTPATIENT
Start: 2023-03-23 | End: 2023-03-31 | Stop reason: HOSPADM

## 2023-03-22 RX ORDER — SODIUM CHLORIDE 0.9 % (FLUSH) 0.9 %
10 SYRINGE (ML) INJECTION EVERY 12 HOURS SCHEDULED
Status: DISCONTINUED | OUTPATIENT
Start: 2023-03-22 | End: 2023-03-31 | Stop reason: HOSPADM

## 2023-03-22 RX ORDER — ACETAMINOPHEN 500 MG
1000 TABLET ORAL ONCE
Status: COMPLETED | OUTPATIENT
Start: 2023-03-22 | End: 2023-03-22

## 2023-03-22 RX ORDER — IBUPROFEN 600 MG/1
1 TABLET ORAL
Status: DISCONTINUED | OUTPATIENT
Start: 2023-03-22 | End: 2023-03-31 | Stop reason: HOSPADM

## 2023-03-22 RX ADMIN — ACETAMINOPHEN 1000 MG: 500 TABLET ORAL at 18:31

## 2023-03-22 RX ADMIN — TAZOBACTAM SODIUM AND PIPERACILLIN SODIUM 4.5 G: 500; 4 INJECTION, SOLUTION INTRAVENOUS at 18:33

## 2023-03-22 RX ADMIN — VANCOMYCIN HYDROCHLORIDE 2250 MG: 10 INJECTION, POWDER, LYOPHILIZED, FOR SOLUTION INTRAVENOUS at 19:10

## 2023-03-22 RX ADMIN — SODIUM CHLORIDE, POTASSIUM CHLORIDE, SODIUM LACTATE AND CALCIUM CHLORIDE 1000 ML: 600; 310; 30; 20 INJECTION, SOLUTION INTRAVENOUS at 19:13

## 2023-03-22 RX ADMIN — Medication 10 ML: at 23:46

## 2023-03-22 NOTE — ED NOTES
Pt presents to ED with complaints of bilat foot pain with wound to L big toe. Pt also reports bitlat hand numbness, dropping things, and shaking. Pt reports this started 2 weeks ago, and got worse over the last two day for family to notice. Pt reports headache and family reports 2 falls recently. Pt reports hx of GOUT.  Pt is A&OX4, able to stand and sit, and in a mask at this time.     Patient was placed in face mask during first look triage.  Patient was wearing a face mask throughout encounter.  I wore personal protective equipment throughout the encounter.  Hand hygiene was performed before and after patient encounter.

## 2023-03-22 NOTE — ED PROVIDER NOTES
EMERGENCY DEPARTMENT ENCOUNTER    Room Number:  20/20  Date seen:  3/22/2023  PCP: Zoya Claros PA      HPI:  Chief Complaint: Foot infection  A complete HPI/ROS/PMH/PSH/SH/FH are unobtainable due to: None  Context: Tomy Manjarrez is a 73 y.o. male who presents to the ED c/o foot infection.  He started noticing a sore at the bottom of his left foot at the first MTP approximately 1 week ago.  Over the last couple days his family has noticed that he has had associated redness and swelling.  He reports having bilateral hand tremor and feeling fatigued. No cough or shortness of breath. No diarrhea, vomiting, or abd pain. No urinary symptoms.           PAST MEDICAL HISTORY  Active Ambulatory Problems     Diagnosis Date Noted   • Left renal mass 11/19/2019   • Type 2 diabetes mellitus with hyperglycemia, with long-term current use of insulin (formerly [facility]) 11/20/2019   • Hypertension 11/20/2019   • Diabetic neuropathy (formerly [facility]) 11/20/2019   • MARTIN (acute kidney injury) (formerly [facility]) 11/20/2019   • CKD (chronic kidney disease) stage 3, GFR 30-59 ml/min (formerly [facility]) 11/20/2019   • Alcohol use 11/20/2019   • Essential hypertension 08/17/2020   • Chronic renal insufficiency, stage III (moderate) (formerly [facility]) 08/17/2020   • Prostatocystitis 08/17/2020   • Hyperglycemia due to type 2 diabetes mellitus (formerly [facility]) 08/17/2020   • Diabetic peripheral neuropathy associated with type 2 diabetes mellitus (formerly [facility]) 08/17/2020   • Stage 3b chronic kidney disease (formerly [facility]) 01/12/2021   • Mixed hyperlipidemia 01/12/2021   • Diabetic peripheral neuropathy (formerly [facility]) 01/12/2021   • Stage 3b chronic kidney disease (formerly [facility]) 11/20/2019   • Diabetic peripheral neuropathy (formerly [facility]) 11/20/2019   • Essential hypertension 11/20/2019   • Type 2 diabetes mellitus with hyperglycemia, with long-term current use of insulin (formerly [facility]) 11/20/2019   • BPH with urinary obstruction 09/20/2021   • Clear cell carcinoma of kidney (formerly [facility]) 08/04/2022     Resolved Ambulatory Problems     Diagnosis Date Noted   •  No Resolved Ambulatory Problems     Past Medical History:   Diagnosis Date   • Arthritis    • BPH (benign prostatic hyperplasia)    • Chronic back pain    • Chronic kidney disease (CKD), stage III (moderate) (HCC)    • Diabetes mellitus (HCC)    • Difficulty urinating    • GERD (gastroesophageal reflux disease)    • Gout    • Hyperlipidemia    • Left kidney mass    • Neuropathy    • Neuropathy in diabetes (HCC)    • Type 2 diabetes mellitus (HCC)          PAST SURGICAL HISTORY  Past Surgical History:   Procedure Laterality Date   • CARDIAC CATHETERIZATION  2016   • COLONOSCOPY     • CYSTOSCOPY TRANSURETHRAL RESECTION OF PROSTATE N/A 9/20/2021    Procedure: CYSTOSCOPY TRANSURETHRAL RESECTION OF PROSTATE TRANSURETHRAL INCISION OF PROSTATE;  Surgeon: Tres Mena MD;  Location: [facility];  Service: Urology;  Laterality: N/A;   • NEPHRECTOMY PARTIAL Left 11/19/2019    Procedure: LEFT PARTIAL NEPHRECTOMY X2, INTRAOPERATIVE RENAL ULTRASONOGRAPHY;  Surgeon: Tres Mena MD;  Location: [facility];  Service: Urology   • WISDOM TOOTH EXTRACTION           FAMILY HISTORY  Family History   Problem Relation Age of Onset   • Diabetes Paternal Grandmother    • Diabetes Paternal Grandfather    • Hypertension Maternal Grandmother    • Cancer Neg Hx    • Malig Hyperthermia Neg Hx          SOCIAL HISTORY  Social History     Socioeconomic History   • Marital status: Single   • Number of children: 2   • Years of education: 14   Tobacco Use   • Smoking status: Former     Years: 1.00     Types: Cigarettes     Quit date: 2008     Years since quitting: 15.2   • Smokeless tobacco: Never   Vaping Use   • Vaping Use: Never used   Substance and Sexual Activity   • Alcohol use: Not Currently     Alcohol/week: 14.0 - 21.0 standard drinks     Types: 14 - 21 Shots of liquor per week   • Drug use: Not Currently   • Sexual activity: Defer         ALLERGIES  Patient has no known allergies.        REVIEW OF  SYSTEMS  Review of Systems     All systems reviewed and negative except for those discussed in HPI.       PHYSICAL EXAM  ED Triage Vitals [03/22/23 1711]   Temp Heart Rate Resp BP SpO2   (!) 100.6 °F (38.1 °C) 98 15 105/64 91 %      Temp src Heart Rate Source Patient Position BP Location FiO2 (%)   Tympanic Monitor Sitting Right arm --       Physical Exam      GENERAL: no acute distress  HENT: nares patent  EYES: no scleral icterus  CV: regular rhythm, normal rate, 2+ left DP pulse  RESPIRATORY: normal effort  ABDOMEN: soft, nontender  MUSCULOSKELETAL: no deformity  NEURO: alert, moves all extremities, follows commands, normal coordination on finger-nose-finger testing, normal rapid alternating motion with his hands  PSYCH:  calm, cooperative  SKIN: There is a pea-sized ulcer to the plantar surface of the left first MTP, left foot is swollen, red and warm with 1+ edema to the ankle    Vital signs and nursing notes reviewed.          LAB RESULTS  No results found for this or any previous visit (from the past 24 hour(s)).    Ordered the above labs and reviewed the results.        RADIOLOGY  XR Foot 3+ View Left    Result Date: 3/22/2023  XR FOOT 3+ VW LEFT-  HISTORY: 73-year-old male with erythema.  FINDINGS: There is air within the soft tissues at the ventral aspect of the foot at the level of the base of the 1st proximal phalanx. On the frontal projection, the air is predominantly at the lateral aspect of the proximal phalanx. No radiopaque foreign body is seen within the soft tissues. No convincing evidence is seen for osteomyelitis. There are advanced advanced osteoarthritic changes at the 1st MTP joint.  This report was finalized on 3/22/2023 6:10 PM by Dr. Lynne Lizarraga M.D.        Ordered the above noted radiological studies. Reviewed by me in PACS.          PROCEDURES  Procedures          MEDICATIONS GIVEN IN ER  Medications   vancomycin 2250 mg/500 mL 0.9% NS IVPB ([facility]) (has no administration in time range)    acetaminophen (TYLENOL) tablet 1,000 mg (has no administration in time range)   piperacillin-tazobactam (ZOSYN) 4.5 g in iso-osmotic dextrose 100 mL IVPB (premix) (has no administration in time range)         MEDICAL DECISION MAKING, PROGRESS, and CONSULTS    Discussion below represents my analysis of pertinent findings related to patient's condition, differential diagnosis, treatment plan and final disposition.      Orders placed during this visit:  Orders Placed This Encounter   Procedures   • Blood Culture - Blood,   • Blood Culture - Blood,   • XR Foot 3+ View Left   • Lactic Acid, Plasma   • Sedimentation Rate   • C-reactive Protein         Additional sources:  - Discussed/obtained information from independent historians: Multiple family members at bedside help provide history in terms of onset of symptoms  Additional information was obtained to confirm the patient's history.          Differential diagnosis:    Necrotizing fasciitis, cellulitis, osteomyelitis    After initial evaluation, I am suspicious that patient will require admission for his diabetic foot infection.          Independent interpretation of labs, radiology studies, and discussions with consultants:  ED Course as of 03/22/23 2355   Wed Mar 22, 2023   1734 Temp(!): 100.6 °F (38.1 °C) [TD]   1747 Temp(!): 102.5 °F (39.2 °C) [TD]   1830 X-ray of the left foot independently interpreted by myself.  The soft tissue gas that is seen on x-ray correlates with where he has the ulcer defect in his foot.  I do not believe this is consistent with necrotizing fasciitis at this time. [TD]   1858 Sed Rate(!): 67 [TD]   1921 Lactate: 1.2 [TD]   2114 WBC(!): 15.53 [MM]   2114 Hemoglobin(!): 11.5 [MM]   2114 This patient was turned over to me by Dr. Richard Felipe.  The patient's plan is to admit him for diabetic foot infection.  He wants me to check a CBC and chemistries just to make sure that there is nothing emergent that needs further treatment here in the  emergency department.  He would like me to call [facility] for admission after they return.  He is already started IV antibiotics.  He is not concerned with necrotizing fasciitis. [MM]   2131 Chemistry panel still pending. [MM]   2135 Patient previous creatinine was 2.257 months ago and 2.41-year ago.  This is just a little bit worse.  Has diabetes and glucose is 219. [MM]   2142 I did speak with Fátima who is the midlevel provider on for [facility].  Informed her of the patient's lab work and my discussion with Dr. Felipe.  Dr. Mathias is the attending for [facility].  Agrees for admission [MM]      ED Course User Index  [MM] Angel Galvan MD  [TD] Richard Felipe II, MD                 PPE: The patient wore a mask throughout the entire encounter. I wore a well-fitting mask.    DIAGNOSIS  Final diagnoses:   Cellulitis of left foot   Diabetic foot infection (HCC)         DISPOSITION  Admit      Latest Documented Vital Signs:  As of 18:28 EDT  BP- 148/61 HR- 91 Temp- (!) 102.5 °F (39.2 °C) (Oral) O2 sat- 91%      --    Please note that portions of this were completed with a voice recognition program.       Note Disclaimer: At [facility], we believe that sharing information builds trust and better relationships. You are receiving this note because you are receiving care at [facility] or recently visited. It is possible you will see health information before a provider has talked with you about it. This kind of information can be easy to misunderstand. To help you fully understand what it means for your health, we urge you to discuss this note with your provider.       Richard Felipe II, MD  03/22/23 3895

## 2023-03-23 ENCOUNTER — APPOINTMENT (OUTPATIENT)
Dept: CARDIOLOGY | Facility: HOSPITAL | Age: 73
DRG: 854 | End: 2023-03-23
Payer: MEDICARE

## 2023-03-23 ENCOUNTER — APPOINTMENT (OUTPATIENT)
Dept: MRI IMAGING | Facility: HOSPITAL | Age: 73
DRG: 854 | End: 2023-03-23
Payer: MEDICARE

## 2023-03-23 LAB
ALBUMIN SERPL-MCNC: 3 G/DL (ref 3.5–5.2)
ALBUMIN/GLOB SERPL: 0.8 G/DL
ALP SERPL-CCNC: 209 U/L (ref 39–117)
ALT SERPL W P-5'-P-CCNC: 10 U/L (ref 1–41)
ANION GAP SERPL CALCULATED.3IONS-SCNC: 13.2 MMOL/L (ref 5–15)
ANION GAP SERPL CALCULATED.3IONS-SCNC: 9 MMOL/L (ref 5–15)
AST SERPL-CCNC: 20 U/L (ref 1–40)
BASOPHILS # BLD AUTO: 0.08 10*3/MM3 (ref 0–0.2)
BASOPHILS NFR BLD AUTO: 0.5 % (ref 0–1.5)
BH CV LOWER ARTERIAL LEFT ABI RATIO: 0.96
BH CV LOWER ARTERIAL LEFT CALF RATIO: 1.02
BH CV LOWER ARTERIAL LEFT DORSALIS PEDIS SYS MAX: 130
BH CV LOWER ARTERIAL LEFT GREAT TOE SYS MAX: NORMAL
BH CV LOWER ARTERIAL LEFT HIGH THIGH RATIO: NORMAL
BH CV LOWER ARTERIAL LEFT HIGH THIGH SYS MAX: NORMAL
BH CV LOWER ARTERIAL LEFT LOW THIGH RATIO: 1.54
BH CV LOWER ARTERIAL LEFT LOW THIGH SYS MAX: 208
BH CV LOWER ARTERIAL LEFT POPLITEAL SYS MAX: 138
BH CV LOWER ARTERIAL LEFT POST TIBIAL SYS MAX: 121
BH CV LOWER ARTERIAL LEFT TBI RATIO: NORMAL
BH CV LOWER ARTERIAL RIGHT ABI RATIO: 1.15
BH CV LOWER ARTERIAL RIGHT CALF RATIO: 1.17
BH CV LOWER ARTERIAL RIGHT DORSALIS PEDIS SYS MAX: 151
BH CV LOWER ARTERIAL RIGHT GREAT TOE SYS MAX: 113
BH CV LOWER ARTERIAL RIGHT HIGH THIGH RATIO: NORMAL
BH CV LOWER ARTERIAL RIGHT HIGH THIGH SYS MAX: NORMAL
BH CV LOWER ARTERIAL RIGHT LOW THIGH RATIO: 1.68
BH CV LOWER ARTERIAL RIGHT LOW THIGH SYS MAX: 227
BH CV LOWER ARTERIAL RIGHT POPLITEAL SYS MAX: 158
BH CV LOWER ARTERIAL RIGHT POST TIBIAL SYS MAX: 155
BH CV LOWER ARTERIAL RIGHT TBI RATIO: 0.84
BILIRUB SERPL-MCNC: 0.7 MG/DL (ref 0–1.2)
BUN SERPL-MCNC: 46 MG/DL (ref 8–23)
BUN SERPL-MCNC: 48 MG/DL (ref 8–23)
BUN/CREAT SERPL: 16.2 (ref 7–25)
BUN/CREAT SERPL: 16.9 (ref 7–25)
CALCIUM SPEC-SCNC: 8.3 MG/DL (ref 8.6–10.5)
CALCIUM SPEC-SCNC: 8.9 MG/DL (ref 8.6–10.5)
CHLORIDE SERPL-SCNC: 100 MMOL/L (ref 98–107)
CHLORIDE SERPL-SCNC: 100 MMOL/L (ref 98–107)
CO2 SERPL-SCNC: 20.8 MMOL/L (ref 22–29)
CO2 SERPL-SCNC: 25 MMOL/L (ref 22–29)
CREAT SERPL-MCNC: 2.72 MG/DL (ref 0.76–1.27)
CREAT SERPL-MCNC: 2.96 MG/DL (ref 0.76–1.27)
DEPRECATED RDW RBC AUTO: 41.8 FL (ref 37–54)
EGFRCR SERPLBLD CKD-EPI 2021: 21.5 ML/MIN/1.73
EGFRCR SERPLBLD CKD-EPI 2021: 23.8 ML/MIN/1.73
EOSINOPHIL # BLD AUTO: 0.18 10*3/MM3 (ref 0–0.4)
EOSINOPHIL NFR BLD AUTO: 1.2 % (ref 0.3–6.2)
ERYTHROCYTE [DISTWIDTH] IN BLOOD BY AUTOMATED COUNT: 13.6 % (ref 12.3–15.4)
GLOBULIN UR ELPH-MCNC: 3.8 GM/DL
GLUCOSE BLDC GLUCOMTR-MCNC: 168 MG/DL (ref 70–130)
GLUCOSE BLDC GLUCOMTR-MCNC: 169 MG/DL (ref 70–130)
GLUCOSE BLDC GLUCOMTR-MCNC: 182 MG/DL (ref 70–130)
GLUCOSE SERPL-MCNC: 196 MG/DL (ref 65–99)
GLUCOSE SERPL-MCNC: 218 MG/DL (ref 65–99)
HCT VFR BLD AUTO: 33 % (ref 37.5–51)
HGB BLD-MCNC: 11 G/DL (ref 13–17.7)
LYMPHOCYTES # BLD AUTO: 1.56 10*3/MM3 (ref 0.7–3.1)
LYMPHOCYTES NFR BLD AUTO: 10.2 % (ref 19.6–45.3)
MAXIMAL PREDICTED HEART RATE: 146 BPM
MCH RBC QN AUTO: 28.4 PG (ref 26.6–33)
MCHC RBC AUTO-ENTMCNC: 33.3 G/DL (ref 31.5–35.7)
MCV RBC AUTO: 85.1 FL (ref 79–97)
MONOCYTES # BLD AUTO: 1.61 10*3/MM3 (ref 0.1–0.9)
MONOCYTES NFR BLD AUTO: 10.6 % (ref 5–12)
NEUTROPHILS NFR BLD AUTO: 11.71 10*3/MM3 (ref 1.7–7)
NEUTROPHILS NFR BLD AUTO: 76.9 % (ref 42.7–76)
PLATELET # BLD AUTO: 166 10*3/MM3 (ref 140–450)
PMV BLD AUTO: 13.7 FL (ref 6–12)
POTASSIUM SERPL-SCNC: 4.1 MMOL/L (ref 3.5–5.2)
POTASSIUM SERPL-SCNC: 4.2 MMOL/L (ref 3.5–5.2)
PROCALCITONIN SERPL-MCNC: 1.89 NG/ML (ref 0–0.25)
PROT SERPL-MCNC: 6.8 G/DL (ref 6–8.5)
RBC # BLD AUTO: 3.88 10*6/MM3 (ref 4.14–5.8)
SODIUM SERPL-SCNC: 134 MMOL/L (ref 136–145)
SODIUM SERPL-SCNC: 134 MMOL/L (ref 136–145)
STRESS TARGET HR: 124 BPM
UPPER ARTERIAL LEFT ARM BRACHIAL SYS MAX: 135 MMHG
UPPER ARTERIAL RIGHT ARM BRACHIAL SYS MAX: 123 MMHG
VANCOMYCIN SERPL-MCNC: 19.3 MCG/ML (ref 5–40)
WBC NRBC COR # BLD: 15.23 10*3/MM3 (ref 3.4–10.8)

## 2023-03-23 PROCEDURE — 99223 1ST HOSP IP/OBS HIGH 75: CPT | Performed by: STUDENT IN AN ORGANIZED HEALTH CARE EDUCATION/TRAINING PROGRAM

## 2023-03-23 PROCEDURE — 25010000002 VANCOMYCIN PER 500 MG: Performed by: NURSE PRACTITIONER

## 2023-03-23 PROCEDURE — 63710000001 INSULIN LISPRO (HUMAN) PER 5 UNITS: Performed by: NURSE PRACTITIONER

## 2023-03-23 PROCEDURE — 80202 ASSAY OF VANCOMYCIN: CPT | Performed by: NURSE PRACTITIONER

## 2023-03-23 PROCEDURE — 73718 MRI LOWER EXTREMITY W/O DYE: CPT

## 2023-03-23 PROCEDURE — 25010000002 PIPERACILLIN SOD-TAZOBACTAM PER 1 G: Performed by: NURSE PRACTITIONER

## 2023-03-23 PROCEDURE — 85025 COMPLETE CBC W/AUTO DIFF WBC: CPT | Performed by: NURSE PRACTITIONER

## 2023-03-23 PROCEDURE — 99231 SBSQ HOSP IP/OBS SF/LOW 25: CPT | Performed by: PSYCHIATRY & NEUROLOGY

## 2023-03-23 PROCEDURE — 82962 GLUCOSE BLOOD TEST: CPT

## 2023-03-23 PROCEDURE — 36415 COLL VENOUS BLD VENIPUNCTURE: CPT | Performed by: NURSE PRACTITIONER

## 2023-03-23 PROCEDURE — 80048 BASIC METABOLIC PNL TOTAL CA: CPT | Performed by: NURSE PRACTITIONER

## 2023-03-23 PROCEDURE — 93923 UPR/LXTR ART STDY 3+ LVLS: CPT

## 2023-03-23 RX ORDER — FERROUS SULFATE 325(65) MG
325 TABLET ORAL
Status: DISCONTINUED | OUTPATIENT
Start: 2023-03-24 | End: 2023-03-28

## 2023-03-23 RX ORDER — NEBIVOLOL 10 MG/1
10 TABLET ORAL EVERY MORNING
Status: DISCONTINUED | OUTPATIENT
Start: 2023-03-24 | End: 2023-03-31 | Stop reason: HOSPADM

## 2023-03-23 RX ORDER — FENOFIBRATE 145 MG/1
145 TABLET, COATED ORAL DAILY
Status: DISCONTINUED | OUTPATIENT
Start: 2023-03-23 | End: 2023-03-31 | Stop reason: HOSPADM

## 2023-03-23 RX ORDER — TIZANIDINE 4 MG/1
4 TABLET ORAL NIGHTLY
Status: DISCONTINUED | OUTPATIENT
Start: 2023-03-23 | End: 2023-03-31 | Stop reason: HOSPADM

## 2023-03-23 RX ORDER — FINASTERIDE 5 MG/1
5 TABLET, FILM COATED ORAL DAILY
Status: DISCONTINUED | OUTPATIENT
Start: 2023-03-23 | End: 2023-03-31 | Stop reason: HOSPADM

## 2023-03-23 RX ORDER — TERAZOSIN 2 MG/1
2 CAPSULE ORAL NIGHTLY
Status: DISCONTINUED | OUTPATIENT
Start: 2023-03-23 | End: 2023-03-26

## 2023-03-23 RX ORDER — ACETAMINOPHEN 325 MG/1
650 TABLET ORAL EVERY 6 HOURS PRN
Status: DISCONTINUED | OUTPATIENT
Start: 2023-03-23 | End: 2023-03-31 | Stop reason: HOSPADM

## 2023-03-23 RX ORDER — LOSARTAN POTASSIUM 100 MG/1
100 TABLET ORAL
Status: DISCONTINUED | OUTPATIENT
Start: 2023-03-23 | End: 2023-03-31 | Stop reason: HOSPADM

## 2023-03-23 RX ORDER — PANTOPRAZOLE SODIUM 40 MG/1
40 TABLET, DELAYED RELEASE ORAL
Status: DISCONTINUED | OUTPATIENT
Start: 2023-03-24 | End: 2023-03-31 | Stop reason: HOSPADM

## 2023-03-23 RX ORDER — DULOXETIN HYDROCHLORIDE 60 MG/1
60 CAPSULE, DELAYED RELEASE ORAL DAILY
Status: DISCONTINUED | OUTPATIENT
Start: 2023-03-23 | End: 2023-03-31 | Stop reason: HOSPADM

## 2023-03-23 RX ORDER — AMITRIPTYLINE HYDROCHLORIDE 25 MG/1
25 TABLET, FILM COATED ORAL NIGHTLY
Status: DISCONTINUED | OUTPATIENT
Start: 2023-03-23 | End: 2023-03-31 | Stop reason: HOSPADM

## 2023-03-23 RX ORDER — HYDROCHLOROTHIAZIDE 25 MG/1
25 TABLET ORAL
Status: DISCONTINUED | OUTPATIENT
Start: 2023-03-23 | End: 2023-03-31 | Stop reason: HOSPADM

## 2023-03-23 RX ORDER — PREGABALIN 100 MG/1
200 CAPSULE ORAL DAILY
Status: DISCONTINUED | OUTPATIENT
Start: 2023-03-23 | End: 2023-03-31 | Stop reason: HOSPADM

## 2023-03-23 RX ORDER — ATORVASTATIN CALCIUM 20 MG/1
40 TABLET, FILM COATED ORAL NIGHTLY
Status: DISCONTINUED | OUTPATIENT
Start: 2023-03-23 | End: 2023-03-31 | Stop reason: HOSPADM

## 2023-03-23 RX ORDER — AMLODIPINE BESYLATE 10 MG/1
10 TABLET ORAL EVERY MORNING
Status: DISCONTINUED | OUTPATIENT
Start: 2023-03-24 | End: 2023-03-31 | Stop reason: HOSPADM

## 2023-03-23 RX ORDER — TRAZODONE HYDROCHLORIDE 100 MG/1
100 TABLET ORAL NIGHTLY
Status: DISCONTINUED | OUTPATIENT
Start: 2023-03-23 | End: 2023-03-31 | Stop reason: HOSPADM

## 2023-03-23 RX ORDER — VANCOMYCIN HYDROCHLORIDE 1 G/200ML
1000 INJECTION, SOLUTION INTRAVENOUS ONCE
Status: COMPLETED | OUTPATIENT
Start: 2023-03-23 | End: 2023-03-23

## 2023-03-23 RX ORDER — TAMSULOSIN HYDROCHLORIDE 0.4 MG/1
0.4 CAPSULE ORAL DAILY
Status: DISCONTINUED | OUTPATIENT
Start: 2023-03-23 | End: 2023-03-31 | Stop reason: HOSPADM

## 2023-03-23 RX ORDER — ALLOPURINOL 100 MG/1
100 TABLET ORAL EVERY EVENING
Status: DISCONTINUED | OUTPATIENT
Start: 2023-03-23 | End: 2023-03-31 | Stop reason: HOSPADM

## 2023-03-23 RX ADMIN — DULOXETINE HYDROCHLORIDE 60 MG: 60 CAPSULE, DELAYED RELEASE ORAL at 17:18

## 2023-03-23 RX ADMIN — VANCOMYCIN HYDROCHLORIDE 1000 MG: 1 INJECTION, SOLUTION INTRAVENOUS at 12:32

## 2023-03-23 RX ADMIN — HYDROCHLOROTHIAZIDE 25 MG: 25 TABLET ORAL at 17:17

## 2023-03-23 RX ADMIN — TAZOBACTAM SODIUM AND PIPERACILLIN SODIUM 3.38 G: 375; 3 INJECTION, SOLUTION INTRAVENOUS at 00:46

## 2023-03-23 RX ADMIN — INSULIN LISPRO 3 UNITS: 100 INJECTION, SOLUTION INTRAVENOUS; SUBCUTANEOUS at 12:33

## 2023-03-23 RX ADMIN — Medication 10 ML: at 21:29

## 2023-03-23 RX ADMIN — TAZOBACTAM SODIUM AND PIPERACILLIN SODIUM 3.38 G: 375; 3 INJECTION, SOLUTION INTRAVENOUS at 08:59

## 2023-03-23 RX ADMIN — TIZANIDINE 4 MG: 4 TABLET ORAL at 21:29

## 2023-03-23 RX ADMIN — TRAZODONE HYDROCHLORIDE 100 MG: 100 TABLET ORAL at 20:50

## 2023-03-23 RX ADMIN — INSULIN LISPRO 3 UNITS: 100 INJECTION, SOLUTION INTRAVENOUS; SUBCUTANEOUS at 17:18

## 2023-03-23 RX ADMIN — Medication: at 09:35

## 2023-03-23 RX ADMIN — ALLOPURINOL 100 MG: 100 TABLET ORAL at 17:17

## 2023-03-23 RX ADMIN — AMITRIPTYLINE HYDROCHLORIDE 25 MG: 25 TABLET, FILM COATED ORAL at 21:29

## 2023-03-23 RX ADMIN — SODIUM CHLORIDE 100 ML/HR: 9 INJECTION, SOLUTION INTRAVENOUS at 17:28

## 2023-03-23 RX ADMIN — SODIUM CHLORIDE 100 ML/HR: 9 INJECTION, SOLUTION INTRAVENOUS at 01:14

## 2023-03-23 RX ADMIN — ACETAMINOPHEN 650 MG: 325 TABLET, FILM COATED ORAL at 18:51

## 2023-03-23 RX ADMIN — TAMSULOSIN HYDROCHLORIDE 0.4 MG: 0.4 CAPSULE ORAL at 17:18

## 2023-03-23 RX ADMIN — LOSARTAN POTASSIUM 100 MG: 100 TABLET, FILM COATED ORAL at 17:17

## 2023-03-23 RX ADMIN — TAZOBACTAM SODIUM AND PIPERACILLIN SODIUM 3.38 G: 375; 3 INJECTION, SOLUTION INTRAVENOUS at 17:18

## 2023-03-23 RX ADMIN — FENOFIBRATE 145 MG: 145 TABLET, FILM COATED ORAL at 17:18

## 2023-03-23 RX ADMIN — INSULIN GLARGINE-YFGN 20 UNITS: 100 INJECTION, SOLUTION SUBCUTANEOUS at 21:29

## 2023-03-23 RX ADMIN — ATORVASTATIN CALCIUM 40 MG: 20 TABLET, FILM COATED ORAL at 21:29

## 2023-03-23 RX ADMIN — INSULIN LISPRO 3 UNITS: 100 INJECTION, SOLUTION INTRAVENOUS; SUBCUTANEOUS at 08:59

## 2023-03-23 RX ADMIN — FINASTERIDE 5 MG: 5 TABLET, FILM COATED ORAL at 17:18

## 2023-03-23 RX ADMIN — ACETAMINOPHEN 650 MG: 325 TABLET, FILM COATED ORAL at 09:13

## 2023-03-23 RX ADMIN — PREGABALIN 200 MG: 100 CAPSULE ORAL at 17:18

## 2023-03-23 NOTE — NURSING NOTE
Pt just voiced complaint of urinary retention. Bladder scan pt with it showing >999 mL. Straight cath performed and 2,000 mL or urine returned. Post-straight cath bladder scan showed 0 mL remaining in bladder. Pt's history shows renal history and BPH. Pt informed that he sees [facility] outside of hospital. Will pass on to dayshift nurse for rounding MDs information.

## 2023-03-23 NOTE — PROGRESS NOTES
[facility] Protocol: Consult for urinary retention    - Urinary retention retention is common for hospitalized patients due to the effects of recumbency and polypharmacy  - Place indwelling catheter for now. Plan voiding trial once patient is ambulatory. Place an indwelling catheter upon discharge if patient unable to urinate or PVR > 250.  - Continue finasteride and Flomax  - If the patient is discharged with an indwelling catheter, please arrange an outpatient consult in the urology office: [phone].  - Will sign off. Please call with questions or concerns.

## 2023-03-23 NOTE — PROGRESS NOTES
"[facility] Clinical Pharmacy Services: Vancomycin and Zosyn Pharmacokinetic Initial Consult Note    Tomy Manjarrez is a 73 y.o. male who is on day 1/5 of pharmacy to dose vancomycin.    Indication: Skin and Soft Tissue  Consulting Provider: RADHA Hartley with [facility]  Planned Duration of Therapy: 5 days  Loading Dose Ordered or Given: 2250 mg on 3/22/23 at 1920  Culture/Source: Blood culture 2/2 sets in process   Target: Dose by Levels  Other Antimicrobials: Zosyn 3.375 gram IV q8h extended infusion protocol     Vitals/Labs  Ht: 190.5 cm (75\"); Wt: 113 kg (250 lb)  Temp Readings from Last 1 Encounters:   03/22/23 99.2 °F (37.3 °C) (Oral)    Estimated Creatinine Clearance: 28.2 mL/min (A) (by C-G formula based on SCr of 3.12 mg/dL (H)).  Patient has CKD; his Scr is 3.12 mg/dL today up from 2.25 mg/dL fron 8/2022.      Results from last 7 days   Lab Units 03/22/23  1830   CREATININE mg/dL 3.12*   WBC 10*3/mm3 15.53*     Assessment/Plan:    1. Pulse dosing vancomycin in the setting of MARTIN on CKD; will order a 12 hour random level with 3/23/23 AM labs to assess clearance and to guide subsequent doses  2. Zosyn 3.375 gram IV q8h extended infusion protocol; CrCL >20 mL and BMI 31.25 kg/m2    Pharmacy will follow patient's kidney function and will adjust doses and obtain levels as necessary. Thank you for involving pharmacy in this patient's care. Please contact pharmacy with any questions or concerns.           Govind Yo, PharmD  Emergency Medicine Clinical Pharmacist   Phone: [phone]    "

## 2023-03-23 NOTE — PROGRESS NOTES
"[facility] Clinical Pharmacy Services: Vancomycin Monitoring Note    Tomy Manjarrez is a 73 y.o. male who is on day 2/5 of pharmacy to dose vancomycin for SSTI.    Previous Vancomycin Dose: 2250 mg x 1 load   Updated Cultures and Sensitivities: In process   Results from last 7 days   Lab Units 03/23/23  0453   VANCOMYCIN RM mcg/mL 19.30     Vitals/Labs  Ht: 190.5 cm (75\"); Wt: 114 kg (252 lb 3.3 oz)   Temp Readings from Last 1 Encounters:   03/23/23 (!) 102.6 °F (39.2 °C) (Oral)     Estimated Creatinine Clearance: 32.5 mL/min (A) (by C-G formula based on SCr of 2.72 mg/dL (H)).     Results from last 7 days   Lab Units 03/23/23  0453 03/22/23  2335 03/22/23  1830   CREATININE mg/dL 2.72* 2.96* 3.12*   WBC 10*3/mm3 15.23*  --  15.53*     Assessment/Plan  Patient with CKD3, Scr currently elevated from what appears to be baseline, but is improving from admission value. Random collected this AM after initial load returned at 19.3 mg/L and is reflective of a ~10 hr level. Continue intermittent dosing for now.    Current Vancomycin Dose: Give a pulse dose of vancomycin 1000 mg x 1 now.   Next Level Date and Time: Random level ordered for 3/24, timed ~24 hours from the above planned dose.  We will continue to monitor patient changes and renal function     Thank you for involving pharmacy in this patient's care. Please contact pharmacy with any questions or concerns.       Cheyenne Gowers, [facility]  Clinical Pharmacist  "

## 2023-03-23 NOTE — H&P
Patient Name:  Tomy Manjarrez  YOB: 1950  MRN:  [id number]  Admit Date:  3/22/2023  Patient Care Team:  Zoay Claros PA as PCP - General (Physician Assistant)  Nati Scruggs MD as Consulting Physician (Endocrinology)  Juan Holbrook MD as Consulting Physician (Nephrology)      Subjective   History Present Illness     Chief Complaint   Patient presents with   • Foot Pain   • Numbness     History of Present Illness   Mr. Manjarrez is a 73-year-old male with history of type 2 diabetes, hypertension, CKD stage III, clear-cell carcinoma of the kidney who presents to the emergency room with foot pain and swelling.  Patient states that he has had some swelling in his left foot for about a week, noticed some redness and worsening of swelling and developed some numbness today.  He also states he had some mild numbness in his hands bilaterally earlier today but that has resolved.  He denies having any fever at home.  He has had no injury to his foot that he is aware of.  He has been able to walk on his foot, he normally walks with a walker and has not had any change in his mobility.  He has not been started on any new medications.  He states his blood sugars run in the 200s, he has been compliant with his medications.  There is a history in the chart of alcohol use, patient denies any recent alcohol use for several years.  In the emergency room patient's blood glucose 219, sodium 131, potassium 4.4, creatinine 3.12, BUN 49, CRP 22.87, lactate 1.2, white blood cell count 15.53, hemoglobin 11.5, hematocrit 34.0, sed rate 67, blood culture pending.  X-ray of his left foot shows air within the soft tissue at the ventral aspect of the foot at the level of the base of the first proximal phalanx, no radiopaque foreign body within the soft tissue, no convincing evidence is seen for osteomyelitis there are advanced osteoarthritic changes at the first M TP joint.  Patient was given vancomycin and Zosyn  in the emergency room.    Review of Systems   Constitutional: Negative for appetite change and fever.   HENT: Negative for nosebleeds and trouble swallowing.    Eyes: Negative for photophobia, redness and visual disturbance.   Respiratory: Negative for cough, chest tightness, shortness of breath and wheezing.    Cardiovascular: Negative for chest pain, palpitations and leg swelling.   Gastrointestinal: Negative for abdominal distention, abdominal pain, nausea and vomiting.   Endocrine: Negative.    Genitourinary: Negative.    Musculoskeletal: Negative for gait problem and joint swelling.   Skin: Negative.         Redness and swelling to left foot mostly around the great toe area   Neurological: Negative for dizziness, seizures, speech difficulty, light-headedness and headaches.   Hematological: Negative.    Psychiatric/Behavioral: Negative for behavioral problems and confusion.        Personal History     Past Medical History:   Diagnosis Date   • Arthritis    • BPH (benign prostatic hyperplasia)    • Chronic back pain    • Chronic kidney disease (CKD), stage III (moderate) (HCC)    • Diabetes mellitus (HCC)     TYPE 2   • Difficulty urinating     REQUIRING STRAIGHT CATH   • GERD (gastroesophageal reflux disease)    • Gout    • Hyperlipidemia    • Hypertension    • Left kidney mass    • Neuropathy     IN FEET   • Neuropathy in diabetes (HCC)    • Type 2 diabetes mellitus (HCC)      Past Surgical History:   Procedure Laterality Date   • CARDIAC CATHETERIZATION  2016   • COLONOSCOPY     • CYSTOSCOPY TRANSURETHRAL RESECTION OF PROSTATE N/A 9/20/2021    Procedure: CYSTOSCOPY TRANSURETHRAL RESECTION OF PROSTATE TRANSURETHRAL INCISION OF PROSTATE;  Surgeon: Tres Mena MD;  Location: [facility];  Service: Urology;  Laterality: N/A;   • NEPHRECTOMY PARTIAL Left 11/19/2019    Procedure: LEFT PARTIAL NEPHRECTOMY X2, INTRAOPERATIVE RENAL ULTRASONOGRAPHY;  Surgeon: Tres Mena MD;   Location: [facility] OR;  Service: Urology   • WISDOM TOOTH EXTRACTION       Family History   Problem Relation Age of Onset   • Diabetes Paternal Grandmother    • Diabetes Paternal Grandfather    • Hypertension Maternal Grandmother    • Cancer Neg Hx    • Malig Hyperthermia Neg Hx      Social History     Tobacco Use   • Smoking status: Former     Years: 1.00     Types: Cigarettes     Quit date: 2008     Years since quitting: 15.2   • Smokeless tobacco: Never   Vaping Use   • Vaping Use: Never used   Substance Use Topics   • Alcohol use: Not Currently     Alcohol/week: 14.0 - 21.0 standard drinks     Types: 14 - 21 Shots of liquor per week   • Drug use: Not Currently     No current facility-administered medications on file prior to encounter.     Current Outpatient Medications on File Prior to Encounter   Medication Sig Dispense Refill   • Insulin Pen Needle (Easy Touch Pen Needles) 31G X 6 MM misc 1 each by Other route 4 (Four) Times a Day. 400 each 3   • ACCU-CHEK FASTCLIX LANCETS misc TEST BEFORE MEALS AND AT BEDTIME 102 each 0   • allopurinol (ZYLOPRIM) 100 MG tablet Take 100 mg by mouth Every Evening.     • amitriptyline (ELAVIL) 50 MG tablet TAKE ONE (1) TABLET BY MOUTH EVERY NIGHT AT BEDTIME     • amLODIPine (NORVASC) 10 MG tablet Take 10 mg by mouth Every Morning.     • atorvastatin (LIPITOR) 40 MG tablet Take 40 mg by mouth Every Night.     • bimatoprost (LUMIGAN) 0.03 % ophthalmic drops Administer 1 drop to both eyes Every Night.     • Cholecalciferol (Vitamin D3) 50 MCG (2000 UT) capsule TAKE ONE (1) CAPSULE BY MOUTH EVERY DAY 30 capsule 12   • clotrimazole (LOTRIMIN) 1 % cream APPLY TO THE AFFECTED AREA TOPICALLY TWICE DAILY     • DULoxetine (CYMBALTA) 60 MG capsule      • fenofibrate 160 MG tablet Take 160 mg by mouth Daily.     • ferrous sulfate 324 (65 Fe) MG tablet delayed-release EC tablet Take 324 mg by mouth Daily With Breakfast. HOLD FOR SURGERY     • finasteride (PROSCAR) 5 MG tablet TAKE ONE  (1) TABLET BY MOUTH EVERY DAY     • glucose blood (Accu-Chek Guide) test strip Test BS three times daily.  DX E11.65 100 each 5   • glucose blood test strip TEST BEFORE MEALS AND AT BEDTIME 100 each 0   • HYDROcodone-acetaminophen (NORCO)  MG per tablet Take 1 tablet by mouth Every 4 (Four) Hours As Needed for Moderate Pain .     • insulin aspart (NovoLOG FlexPen) 100 UNIT/ML solution pen-injector sc pen Inject 7 Units under the skin into the appropriate area as directed 3 (Three) Times a Day Before Meals. 15 mL 3   • insulin degludec (Tresiba FlexTouch) 100 UNIT/ML solution pen-injector injection Inject 20 Units under the skin into the appropriate area as directed Every Night. 15 mL 3   • losartan-hydrochlorothiazide (HYZAAR) 100-25 MG per tablet Take 1 tablet by mouth Daily.     • Lumigan 0.01 % ophthalmic drops      • nebivolol (BYSTOLIC) 10 MG tablet Take 10 mg by mouth Every Morning.     • omeprazole (priLOSEC) 40 MG capsule Take 40 mg by mouth Every Night.     • Ozempic, 1 MG/DOSE, 4 MG/3ML solution pen-injector INJECT 1 MG ONCE WEEKLY     • pregabalin (LYRICA) 200 MG capsule Take 200 mg by mouth.     • pregabalin (LYRICA) 300 MG capsule TAKE ONE (1) CAPSULE BY MOUTH TWICE DAILY     • Semaglutide, 1 MG/DOSE, (OZEMPIC) 2 MG/1.5ML solution pen-injector Inject 1 mg under the skin into the appropriate area as directed 1 (One) Time Per Week. 1.5 mL 6   • tamsulosin (FLOMAX) 0.4 MG capsule 24 hr capsule TAKE ONE (1) CAPSULE BY MOUTH EVERY DAY     • terazosin (HYTRIN) 2 MG capsule Take 2 mg by mouth every night at bedtime.     • tiZANidine (ZANAFLEX) 4 MG tablet Take 4 mg by mouth Every Night.     • traZODone (DESYREL) 100 MG tablet Take 100 mg by mouth Every Night. 2 tablet at night     • triamcinolone (KENALOG) 0.5 % cream Apply 1 application topically to the appropriate area as directed Daily.       No Known Allergies    Objective    Objective     Vital Signs  Temp:  [98.4 °F (36.9 °C)-102.5 °F (39.2 °C)]  98.4 °F (36.9 °C)  Heart Rate:  [73-98] 73  Resp:  [15-24] 22  BP: (105-148)/(50-83) 123/63  SpO2:  [91 %-93 %] 92 %  on   ;   Device (Oxygen Therapy): room air  Body mass index is 31.25 kg/m².    Physical Exam  Vitals and nursing note reviewed.   Constitutional:       General: He is not in acute distress.     Appearance: He is well-developed.   HENT:      Head: Normocephalic.   Neck:      Vascular: No JVD.   Cardiovascular:      Rate and Rhythm: Normal rate and regular rhythm.      Comments: Normal sinus rhythm on the monitor with heart rate 78 during my exam, he does have left foot edema, likely from a cellulitis, no pitting edema  Pulmonary:      Effort: Pulmonary effort is normal.      Breath sounds: Normal breath sounds.      Comments: Lung sounds clear, sats 97% on room air  Abdominal:      General: Bowel sounds are normal. There is no distension.      Palpations: Abdomen is soft.      Tenderness: There is no abdominal tenderness.   Musculoskeletal:         General: Normal range of motion.      Cervical back: Normal range of motion.        Feet:    Skin:     General: Skin is warm and dry.      Capillary Refill: Capillary refill takes less than 2 seconds.   Neurological:      General: No focal deficit present.      Mental Status: He is alert and oriented to person, place, and time.   Psychiatric:         Behavior: Behavior normal.         Results Review:  I reviewed the patient's new clinical results.  I reviewed the patient's new imaging results and agree with the interpretation.  I reviewed the patient's other test results and agree with the interpretation  I personally viewed and interpreted the patient's EKG/Telemetry data  Discussed with ED provider.    Lab Results (last 24 hours)     Procedure Component Value Units Date/Time    Blood Culture - Blood, Arm, Left [[id number]] Collected: 03/22/23 1830    Specimen: Blood from Arm, Left Updated: 03/22/23 1840    Blood Culture - Blood, Arm, Right [[id number]]  Collected: 03/22/23 1830    Specimen: Blood from Arm, Right Updated: 03/22/23 1840    Lactic Acid, Plasma [[id number]]  (Normal) Collected: 03/22/23 1830    Specimen: Blood Updated: 03/22/23 1902     Lactate 1.2 mmol/L     Sedimentation Rate [[id number]]  (Abnormal) Collected: 03/22/23 1830    Specimen: Blood Updated: 03/22/23 1857     Sed Rate 67 mm/hr     C-reactive Protein [[id number]]  (Abnormal) Collected: 03/22/23 1830    Specimen: Blood Updated: 03/22/23 1927     C-Reactive Protein 22.87 mg/dL     CBC & Differential [[id number]]  (Abnormal) Collected: 03/22/23 1830    Specimen: Blood Updated: 03/22/23 2024    Narrative:      The following orders were created for panel order CBC & Differential.  Procedure                               Abnormality         Status                     ---------                               -----------         ------                     CBC Auto Differential[[id number]]        Abnormal            Final result                 Please view results for these tests on the individual orders.    Basic Metabolic Panel [[id number]]  (Abnormal) Collected: 03/22/23 1830    Specimen: Blood Updated: 03/22/23 2135     Glucose 219 mg/dL      BUN 49 mg/dL      Creatinine 3.12 mg/dL      Sodium 131 mmol/L      Potassium 4.4 mmol/L      Chloride 96 mmol/L      CO2 23.0 mmol/L      Calcium 8.9 mg/dL      BUN/Creatinine Ratio 15.7     Anion Gap 12.0 mmol/L      eGFR 20.2 mL/min/1.73     Narrative:      GFR Normal >60  Chronic Kidney Disease <60  Kidney Failure <15    The GFR formula is only valid for adults with stable renal function between ages 18 and 70.    CBC Auto Differential [[id number]]  (Abnormal) Collected: 03/22/23 1830    Specimen: Blood Updated: 03/22/23 2024     WBC 15.53 10*3/mm3      RBC 4.00 10*6/mm3      Hemoglobin 11.5 g/dL      Hematocrit 34.0 %      MCV 85.0 fL      MCH 28.8 pg      MCHC 33.8 g/dL      RDW 13.8 %      RDW-SD 42.4 fl      Platelets 176 10*3/mm3     Manual Differential  [[id number]]  (Abnormal) Collected: 03/22/23 1830    Specimen: Blood Updated: 03/22/23 2048     Neutrophil % 87.0 %      Lymphocyte % 7.0 %      Monocyte % 3.0 %      Eosinophil % 1.0 %      Basophil % 2.0 %      Neutrophils Absolute 13.51 10*3/mm3      Lymphocytes Absolute 1.09 10*3/mm3      Monocytes Absolute 0.47 10*3/mm3      Eosinophils Absolute 0.16 10*3/mm3      Basophils Absolute 0.31 10*3/mm3      RBC Morphology Normal     WBC Morphology Normal     Platelet Morphology Normal          Imaging Results (Last 24 Hours)     Procedure Component Value Units Date/Time    XR Foot 3+ View Left [[id number]] Collected: 03/22/23 1806     Updated: 03/22/23 1813    Narrative:      XR FOOT 3+ VW LEFT-     HISTORY: 73-year-old male with erythema.     FINDINGS: There is air within the soft tissues at the ventral aspect of  the foot at the level of the base of the 1st proximal phalanx. On the  frontal projection, the air is predominantly at the lateral aspect of  the proximal phalanx. No radiopaque foreign body is seen within the soft  tissues. No convincing evidence is seen for osteomyelitis. There are  advanced advanced osteoarthritic changes at the 1st MTP joint.     This report was finalized on 3/22/2023 6:10 PM by Dr. Lynne Lizarraga M.D.                 No orders to display        Assessment/Plan     Active Hospital Problems    Diagnosis  POA   • **Cellulitis of left foot [L03.116]  Yes   • Hyponatremia [E87.1]  Unknown   • Clear cell carcinoma of kidney (HCC) [C64.9]  Yes   • Essential hypertension [I10]  Yes   • Type 2 diabetes mellitus with hyperglycemia, with long-term current use of insulin ([facility]) [E11.65, Z79.4]  Not Applicable     TYPE 2     • Hypertension [I10]  Yes   • MARTIN (acute kidney injury) ([facility]) [N17.9]  Yes   • CKD (chronic kidney disease) stage 3, GFR 30-59 ml/min ([facility]) [N18.30]  Yes     Mr. Manjarrez is a 73-year-old male with history of type 2 diabetes, hypertension, CKD stage III, clear-cell carcinoma of the  kidney who presents to the emergency room with foot pain and swelling.     Cellulitis of the left foot  -Blood cultures are pending  -Patient started on vancomycin and Zosyn in the emergency room, will continue that awaiting cultures  -Neurovascular checks every 4 hours  -Recheck CBC in the a.m.    MARTIN/CKD stage III  -Normal saline at 100 cc an hour overnight  -Recheck BMP in a.m.  -Check postvoid residuals and bladder scan every shift  -Strict I's and O's  -May need nephrology consult if no improvement in MARTIN, looks like baseline creatinine is 2.2  -Avoid nephrotoxic medications    Hyponatremia  -Normal saline at 100 cc an hour overnight  -Recheck BMP in a.m.  -Telemetry unit for monitoring    Type 2 diabetes  -Accu-Cheks ACHS with correctional dose insulin  -Hold oral diabetic medications at this time  -Continue long-acting insulin    · I discussed the patient's findings and my recommendations with patient.    VTE Prophylaxis - SCDs.  Code Status - Full code.       RADHA Hamilton  [facility]  03/22/23  23:29 EDT

## 2023-03-23 NOTE — CONSULTS
Referring Provider: Maurice Salvador*  Reason for Consultation: Diabetic foot infection  Chief Complaint   Patient presents with   • Foot Pain   • Numbness         Subjective   History of present illness: Patient is a 73-year-old male with past medical history of type 2 diabetes, clear-cell carcinoma of the kidney, CKD stage III, and peripheral neuropathy who presents with worsening left foot swelling and redness.  ID consulted for diabetic foot infection.    On admission patient had a fever, leukocytosis with elevated inflammatory markers and x-ray showing soft tissue swelling and air on the lateral aspect of the left foot but no evidence of bony involvement.  He was seen by vascular surgery and started on empiric antibiotics with vancomycin and Zosyn.  Plan is for MRI and possible trip to the OR for debridement.    Patient reports about 3 weeks of worsening swelling and erythema of the left foot.  States that originally started as a small area underneath the first toe but continued to grow and develop worsening redness and swelling.  States that he has pain in that foot as well that is pretty much everywhere below the ankle.  States he was febrile and feeling very fatigued at home.    Past Medical History:   Diagnosis Date   • Arthritis    • BPH (benign prostatic hyperplasia)    • Chronic back pain    • Chronic kidney disease (CKD), stage III (moderate) ([facility])    • Diabetes mellitus ([facility])     TYPE 2   • Difficulty urinating     REQUIRING STRAIGHT CATH   • GERD (gastroesophageal reflux disease)    • Gout    • Hyperlipidemia    • Hypertension    • Left kidney mass    • Neuropathy     IN FEET   • Neuropathy in diabetes (HCC)    • Type 2 diabetes mellitus (HCC)        Past Surgical History:   Procedure Laterality Date   • CARDIAC CATHETERIZATION  2016   • COLONOSCOPY     • CYSTOSCOPY TRANSURETHRAL RESECTION OF PROSTATE N/A 9/20/2021    Procedure: CYSTOSCOPY TRANSURETHRAL RESECTION OF PROSTATE TRANSURETHRAL  INCISION OF PROSTATE;  Surgeon: Tres Mena MD;  Location: [facility];  Service: Urology;  Laterality: N/A;   • NEPHRECTOMY PARTIAL Left 11/19/2019    Procedure: LEFT PARTIAL NEPHRECTOMY X2, INTRAOPERATIVE RENAL ULTRASONOGRAPHY;  Surgeon: Tres Mena MD;  Location: [facility];  Service: Urology   • WISDOM TOOTH EXTRACTION         family history includes Diabetes in his paternal grandfather and paternal grandmother; Hypertension in his maternal grandmother.     reports that he quit smoking about 15 years ago. His smoking use included cigarettes. He has never used smokeless tobacco. He reports that he does not currently use alcohol after a past usage of about 14.0 - 21.0 standard drinks per week. He reports that he does not currently use drugs.     No Known Allergies    Medication:  Antibiotics:  Anti-Infectives (From admission, onward)    Ordered     Dose/Rate Route Frequency Start Stop    03/23/23 0835  vancomycin (VANCOCIN) 1000 mg/200 mL dextrose 5% IVPB        Ordering Provider: Anuradha Desouza APRN    1,000 mg  over 60 Minutes Intravenous Once 03/23/23 1030      03/22/23 2315  Vancomycin Pharmacy Intermittent/Pulse Dosing        Ordering Provider: Anuradha Desouza APRN     Does not apply Daily 03/23/23 0900 03/28/23 0859    03/22/23 2315  piperacillin-tazobactam (ZOSYN) 3.375 g in iso-osmotic dextrose 50 ml (premix)        Ordering Provider: Anuradha Desouza APRN    3.375 g  over 4 Hours Intravenous Every 8 Hours 03/23/23 0030 03/28/23 0029    03/22/23 2308  Pharmacy to dose vancomycin        Ordering Provider: Anuradha Desouza APRN     Does not apply Continuous PRN 03/22/23 2308 03/27/23 2307    03/22/23 1744  piperacillin-tazobactam (ZOSYN) 4.5 g in iso-osmotic dextrose 100 mL IVPB (premix)        Ordering Provider: Richard Felipe II, MD    4.5 g  over 30 Minutes Intravenous Once 03/22/23 1746 03/22/23 1910    03/22/23 1741  vancomycin 2250  mg/500 mL 0.9% NS IVPB ([facility])        Ordering Provider: Richard Felipe II, MD    20 mg/kg × 113 kg Intravenous Once 03/22/23 1743 03/22/23 2238            Objective     Physical Exam:   Vital Signs   Temp:  [98.4 °F (36.9 °C)-102.6 °F (39.2 °C)] 102.6 °F (39.2 °C)  Heart Rate:  [73-98] 95  Resp:  [15-24] 18  BP: (105-148)/(50-83) 148/65    GENERAL: Awake and alert, in no acute distress.   HEENT: Oropharynx is clear. Hearing is grossly normal.   EYES: PERRL. No conjunctival injection. No lid lag.   HEART: Regular rate and regular rhythm. No peripheral edema.   LUNGS: Normal work of breathing  GI: Soft, nontender, nondistended.   SKIN: Significant edema and erythema of the left foot extending up to the ankle.  No purulent discharge.  PSYCHIATRIC: Appropriate mood, affect, insight, and judgment.     Results Review:   I reviewed the patient's new clinical results.  I reviewed the patient's new imaging results and agree with the interpretation.  I reviewed the patient's other test results and agree with the interpretation    Lab Results   Component Value Date    WBC 15.23 (H) 03/23/2023    HGB 11.0 (L) 03/23/2023    HCT 33.0 (L) 03/23/2023    MCV 85.1 03/23/2023     03/23/2023       Lab Results   Component Value Date    VANCORANDOM 19.30 03/23/2023       Lab Results   Component Value Date    GLUCOSE 196 (H) 03/23/2023    BUN 46 (H) 03/23/2023    CREATININE 2.72 (H) 03/23/2023    EGFRIFNONA 29 (L) 09/10/2021    EGFRIFAFRI 32 (L) 01/18/2022    BCR 16.9 03/23/2023    CO2 25.0 03/23/2023    CALCIUM 8.3 (L) 03/23/2023    ALBUMIN 3.0 (L) 03/22/2023    LABIL2 1.1 07/01/2020    AST 20 03/22/2023    ALT 10 03/22/2023         Estimated Creatinine Clearance: 32.5 mL/min (A) (by C-G formula based on SCr of 2.72 mg/dL (H)).      Microbiology:  3/22 blood cultures in process    Radiology:  3/22 left foot x-ray report reviewed with air within the soft tissues at the ventral aspect of the foot at the level of the base of the  first proximal phalanx.  No foreign bodies.  No evidence of osteomyelitis.    Assessment   #Sepsis  #Left diabetic foot infection  #Type 2 diabetes  #MARTIN on CKD  #Renal clear-cell carcinoma    Patient down for MRI this morning and will plan to follow this up.  Agree with empiric antibiotic coverage and patient has been started on IV vancomycin goal -600 and Zosyn 3.375 every 8 hours for now.  Hopefully will de-escalate based on cultures from the OR.  Follow vancomycin levels for therapeutic monitoring and renal function for antibiotic dosing adjustment.      Thank you for this consult.  We will continue to follow along and tailor antibiotics as the patient's clinical course evolves.

## 2023-03-23 NOTE — CONSULTS
Patient Name: Tomy Manjarrez Account #: [id number]    MRN: [id number] Admission Date: 3/22/2023      Consulting Service: Vascular Surgery Date of Evaluation: 2023    Requesting Provider: VICENTE Mathias MD      [place]      CHIEF COMPLAINT: Diabetic foot infection left foot    HPI: Tomy Manjarrez is a 73 y.o. male is being seen for a consultation and evaluation/management of left mal perforans ulcer with diabetic foot infection.  Patient has uncontrolled diabetes with an A1c of 11.  Patient is neuropathic and has walked a whole into his foot deformity on the mal perforans ulcer and wound infection.  Is been going on 2 to 3 weeks.  He noticed some drainage yesterday and came to the ER.  X-ray shows intact bone but some mild soft tissue gas near the ulceration and soft tissue swelling.  My concern is that the is likely septic metatarsal phalangeal joint based on exam.  Unfortunately it is difficult to determine the extent of debridement and resection of possible bone and the toe based on plain x-ray with no evidence of osteomyelitis.  I discussed with the patient and family I believe that an MRI is imperative for planning of extensive surgery.  The patient and family agree.  We will continue antibiotics and order stat MRI.  We will try to get him on the schedule for surgery late tonight or early tomorrow if possible.  The patient will need arterial testing although I think I can feel his pulses at the posterior tibials bilaterally.  We will get toe pressures as well.    PAST MEDICAL HISTORY:   Past Medical History:   Diagnosis Date   • Arthritis    • BPH (benign prostatic hyperplasia)    • Chronic back pain    • Chronic kidney disease (CKD), stage III (moderate) ([facility])    • Diabetes mellitus (HCC)     TYPE 2   • Difficulty urinating     REQUIRING STRAIGHT CATH   • GERD (gastroesophageal reflux disease)    • Gout    • Hyperlipidemia    • Hypertension    • Left kidney mass    • Neuropathy     IN FEET    • Neuropathy in diabetes (HCC)    • Type 2 diabetes mellitus (HCC)       PAST SURGICAL HISTORY:   Past Surgical History:   Procedure Laterality Date   • CARDIAC CATHETERIZATION  2016   • COLONOSCOPY     • CYSTOSCOPY TRANSURETHRAL RESECTION OF PROSTATE N/A 9/20/2021    Procedure: CYSTOSCOPY TRANSURETHRAL RESECTION OF PROSTATE TRANSURETHRAL INCISION OF PROSTATE;  Surgeon: Tres Mena MD;  Location: [facility];  Service: Urology;  Laterality: N/A;   • NEPHRECTOMY PARTIAL Left 11/19/2019    Procedure: LEFT PARTIAL NEPHRECTOMY X2, INTRAOPERATIVE RENAL ULTRASONOGRAPHY;  Surgeon: Tres Mena MD;  Location: [facility];  Service: Urology   • WISDOM TOOTH EXTRACTION        FAMILY HISTORY:   Family History   Problem Relation Age of Onset   • Diabetes Paternal Grandmother    • Diabetes Paternal Grandfather    • Hypertension Maternal Grandmother    • Cancer Neg Hx    • Malig Hyperthermia Neg Hx       SOCIAL HISTORY:   Social History     Tobacco Use   • Smoking status: Former     Years: 1.00     Types: Cigarettes     Quit date: 2008     Years since quitting: 15.2   • Smokeless tobacco: Never   Vaping Use   • Vaping Use: Never used   Substance Use Topics   • Alcohol use: Not Currently     Alcohol/week: 14.0 - 21.0 standard drinks     Types: 14 - 21 Shots of liquor per week   • Drug use: Not Currently      MEDICATIONS:   No current facility-administered medications on file prior to encounter.     Current Outpatient Medications on File Prior to Encounter   Medication Sig Dispense Refill   • ACCU-CHEK FASTCLIX LANCETS misc TEST BEFORE MEALS AND AT BEDTIME 102 each 0   • allopurinol (ZYLOPRIM) 100 MG tablet Take 1 tablet by mouth Every Evening.     • amitriptyline (ELAVIL) 50 MG tablet TAKE ONE (1) TABLET BY MOUTH EVERY NIGHT AT BEDTIME     • amLODIPine (NORVASC) 10 MG tablet Take 1 tablet by mouth Every Morning.     • atorvastatin (LIPITOR) 40 MG tablet Take 1 tablet by mouth Every Night.      • bimatoprost (LUMIGAN) 0.03 % ophthalmic drops Administer 1 drop to both eyes Every Night.     • Cholecalciferol (Vitamin D3) 50 MCG (2000 UT) capsule TAKE ONE (1) CAPSULE BY MOUTH EVERY DAY 30 capsule 12   • clotrimazole (LOTRIMIN) 1 % cream APPLY TO THE AFFECTED AREA TOPICALLY TWICE DAILY     • DULoxetine (CYMBALTA) 60 MG capsule      • fenofibrate 160 MG tablet Take 1 tablet by mouth Daily.     • ferrous sulfate 324 (65 Fe) MG tablet delayed-release EC tablet Take 1 tablet by mouth Daily With Breakfast. HOLD FOR SURGERY     • finasteride (PROSCAR) 5 MG tablet TAKE ONE (1) TABLET BY MOUTH EVERY DAY     • glucose blood (Accu-Chek Guide) test strip Test BS three times daily.  DX E11.65 100 each 5   • glucose blood test strip TEST BEFORE MEALS AND AT BEDTIME 100 each 0   • HYDROcodone-acetaminophen (NORCO)  MG per tablet Take 1 tablet by mouth Every 4 (Four) Hours As Needed for Moderate Pain.     • insulin aspart (NovoLOG FlexPen) 100 UNIT/ML solution pen-injector sc pen Inject 7 Units under the skin into the appropriate area as directed 3 (Three) Times a Day Before Meals. 15 mL 3   • insulin degludec (Tresiba FlexTouch) 100 UNIT/ML solution pen-injector injection Inject 20 Units under the skin into the appropriate area as directed Every Night. 15 mL 3   • Insulin Pen Needle (Easy Touch Pen Needles) 31G X 6 MM misc 1 each by Other route 4 (Four) Times a Day. 400 each 3   • losartan-hydrochlorothiazide (HYZAAR) 100-25 MG per tablet Take 1 tablet by mouth Daily.     • Lumigan 0.01 % ophthalmic drops      • nebivolol (BYSTOLIC) 10 MG tablet Take 1 tablet by mouth Every Morning.     • omeprazole (priLOSEC) 40 MG capsule Take 1 capsule by mouth Every Night.     • Ozempic, 1 MG/DOSE, 4 MG/3ML solution pen-injector INJECT 1 MG ONCE WEEKLY     • pregabalin (LYRICA) 200 MG capsule Take 1 capsule by mouth.     • pregabalin (LYRICA) 300 MG capsule TAKE ONE (1) CAPSULE BY MOUTH TWICE DAILY     • Semaglutide, 1 MG/DOSE,  (OZEMPIC) 2 MG/1.5ML solution pen-injector Inject 1 mg under the skin into the appropriate area as directed 1 (One) Time Per Week. 1.5 mL 6   • tamsulosin (FLOMAX) 0.4 MG capsule 24 hr capsule TAKE ONE (1) CAPSULE BY MOUTH EVERY DAY     • terazosin (HYTRIN) 2 MG capsule Take 1 capsule by mouth every night at bedtime.     • tiZANidine (ZANAFLEX) 4 MG tablet Take 1 tablet by mouth Every Night.     • traZODone (DESYREL) 100 MG tablet Take 1 tablet by mouth Every Night. 2 tablet at night     • triamcinolone (KENALOG) 0.5 % cream Apply 1 application topically to the appropriate area as directed Daily.               ALLERGIES: Patient has no known allergies.   COMPLETE REVIEW OF SYSTEMS:     ENT ROS: negative  Cardiovascular ROS: no chest pain or dyspnea on exertion  Respiratory ROS: no cough, shortness of breath, or wheezing  Gastrointestinal ROS: no abdominal pain, change in bowel habits, or black or bloody stools  Neurological ROS: no TIA or stroke symptoms  Genito-Urinary ROS: no dysuria, trouble voiding, or hematuria  Musculoskeletal ROS: Positive for pain in the left foot  Dermatological ROS: negative  Psychological ROS: negative      PHYSICAL EXAM:   Patient Vitals for the past 24 hrs:   BP Temp Temp src Pulse Resp SpO2 Height Weight   03/23/23 0400 -- -- -- 78 -- 92 % -- --   03/23/23 0000 -- -- -- 81 -- 90 % -- --   03/22/23 2325 123/63 98.4 °F (36.9 °C) Axillary 73 22 92 % -- --   03/22/23 2300 -- -- -- 76 -- 91 % -- --   03/22/23 2254 -- -- -- 75 -- 93 % -- --   03/22/23 2235 -- -- -- -- -- -- -- 114 kg (252 lb 3.3 oz)   03/22/23 2206 128/50 -- -- 83 -- 91 % -- --   03/22/23 2152 -- 99.2 °F (37.3 °C) Oral -- 24 -- -- --   03/22/23 2106 109/83 -- -- -- -- 91 % -- --   03/22/23 2105 -- -- -- 83 -- -- -- --   03/22/23 2049 -- -- -- 82 -- 93 % -- --   03/22/23 2036 113/53 -- -- 81 -- -- -- --   03/22/23 1936 112/58 -- -- 89 -- -- -- --   03/22/23 1915 -- 99.7 °F (37.6 °C) Oral -- -- -- -- --   03/22/23 1912 -- --  "-- 89 -- 92 % -- --   03/22/23 1736 148/61 (!) 102.5 °F (39.2 °C) Oral 91 22 91 % -- --   03/22/23 1711 105/64 (!) 100.6 °F (38.1 °C) Tympanic 98 15 91 % 190.5 cm (75\") 113 kg (250 lb)        General appearance: oriented to person, place, and time, in mild to moderate distress and chronically ill appearing.  Neurological exam reveals alert, oriented, normal speech, no focal findings or movement disorder noted.  ENT exam reveals - ENT exam normal, no neck nodes or sinus tenderness.  CVS exam: normal rate, regular rhythm, normal S1, S2, no murmurs, rubs, clicks or gallops.  Chest: clear to auscultation, no wheezes, rales or rhonchi, symmetric air entry.  Abdominal exam: soft, nontender, nondistended, no masses or organomegaly.  Examination of the feet reveals warm, good capillary refill.  Left foot with swelling around the first toe.  No crepitus.  Skin viable with mal perforans ulcer at the base in the ball of the foot.   Palpable pulses on the right foot.  Left foot difficult to palpate posterior tibial but still present.  Nonpalpable anterior tibial pulse.        LABS:      Results Review:       I reviewed the patient's new clinical results.  Results from last 7 days   Lab Units 03/23/23  0453 03/22/23  1830   WBC 10*3/mm3 15.23* 15.53*   HEMOGLOBIN g/dL 11.0* 11.5*   PLATELETS 10*3/mm3 166 176     Results from last 7 days   Lab Units 03/23/23  0453 03/22/23  2335 03/22/23  1830   SODIUM mmol/L 134* 134* 131*   POTASSIUM mmol/L 4.1 4.2 4.4   CHLORIDE mmol/L 100 100 96*   CO2 mmol/L 25.0 20.8* 23.0   BUN mg/dL 46* 48* 49*   CREATININE mg/dL 2.72* 2.96* 3.12*   GLUCOSE mg/dL 196* 218* 219*   Estimated Creatinine Clearance: 32.5 mL/min (A) (by C-G formula based on SCr of 2.72 mg/dL (H)).  Results from last 7 days   Lab Units 03/23/23  0453 03/22/23  2335 03/22/23  1830   CALCIUM mg/dL 8.3* 8.9 8.9   ALBUMIN g/dL  --  3.0*  --            The following radiologic or non-invasive studies have been reviewed by me: Plain " x-rays reviewed with images reviewed    Active Hospital Problems    Diagnosis  POA   • **Cellulitis of left foot [L03.116]  Yes   • Hyponatremia [E87.1]  Unknown   • Clear cell carcinoma of kidney (HCC) [C64.9]  Yes   • Essential hypertension [I10]  Yes   • Type 2 diabetes mellitus with hyperglycemia, with long-term current use of insulin (HCC) [E11.65, Z79.4]  Not Applicable   • Hypertension [I10]  Yes   • MARTIN (acute kidney injury) ([facility]) [N17.9]  Yes   • CKD (chronic kidney disease) stage 3, GFR 30-59 ml/min ([facility]) [N18.30]  Yes      Resolved Hospital Problems   No resolved problems to display.         ASSESSMENT/PLAN: 73 y.o. male with mal perforans ulcer and diabetic foot infection.  I suspect there is renal involvement of the metatarsal phalangeal joint of the first digit although x-ray does not support this.  My concern is that surgically we will need some evidence of involvement to determine whether toe amputation will be necessary or not.  He is currently stable on antibiotic therapy and we will ask ID to see him.  He will need to be treated aggressively with antibiotics.  We will get cultures Intra-Op but we would not  delay antibiotic therapy as well we will get cultures regardless.  We will try to get this scheduled appropriately later this evening or early tomorrow depending on when our testing can be completed and the patient's clinical status.  Give him n.p.o. for now and will make him n.p.o. after midnight tonight as well.      I discussed the plan with the patient and his wife and they are  agreeable to the plan of care at this point. Thank you for this consult.     Baltazar Oh MD   03/23/23

## 2023-03-23 NOTE — PROGRESS NOTES
Name: Tomy Manjarrez ADMIT: 3/22/2023   : 1950  PCP: Zoya Claros PA    MRN: [id number] LOS: 1 days   AGE/SEX: 73 y.o. male  ROOM: [facility]     Subjective   Subjective     Patient is lying in the bed and does not appear to be any major distress.  Appears weak and lethargic.  Denies nausea, vomiting, abdominal pain, chest pain.       Objective   Objective   Vital Signs  Temp:  [98.4 °F (36.9 °C)-102.6 °F (39.2 °C)] 99.9 °F (37.7 °C)  Heart Rate:  [73-98] 78  Resp:  [15-24] 18  BP: (105-148)/(50-83) 128/62  SpO2:  [90 %-96 %] 91 %  on   ;   Device (Oxygen Therapy): room air  Body mass index is 31.52 kg/m².  Physical Exam  Constitutional:       General: He is not in acute distress.     Appearance: He is ill-appearing.   HENT:      Head: Normocephalic and atraumatic.      Mouth/Throat:      Mouth: Mucous membranes are moist.   Eyes:      Pupils: Pupils are equal, round, and reactive to light.   Cardiovascular:      Rate and Rhythm: Normal rate and regular rhythm.      Pulses: Normal pulses.      Heart sounds: Normal heart sounds.   Pulmonary:      Effort: Pulmonary effort is normal.      Breath sounds: Normal breath sounds.   Abdominal:      General: Bowel sounds are normal. There is no distension.      Palpations: Abdomen is soft.      Tenderness: There is no abdominal tenderness.   Musculoskeletal:      Cervical back: Normal range of motion and neck supple.      Comments: Left foot edema and erythema noted   Skin:     General: Skin is warm and dry.   Neurological:      General: No focal deficit present.      Mental Status: He is alert and oriented to person, place, and time.       Results Review     I reviewed the patient's new clinical results.  Results from last 7 days   Lab Units 23  0453 23  1830   WBC 10*3/mm3 15.23* 15.53*   HEMOGLOBIN g/dL 11.0* 11.5*   PLATELETS 10*3/mm3 166 176     Results from last 7 days   Lab Units 23  0453 23  2335 23  1830   SODIUM  mmol/L 134* 134* 131*   POTASSIUM mmol/L 4.1 4.2 4.4   CHLORIDE mmol/L 100 100 96*   CO2 mmol/L 25.0 20.8* 23.0   BUN mg/dL 46* 48* 49*   CREATININE mg/dL 2.72* 2.96* 3.12*   GLUCOSE mg/dL 196* 218* 219*   EGFR mL/min/1.73 23.8* 21.5* 20.2*     Results from last 7 days   Lab Units 03/22/23  2335   ALBUMIN g/dL 3.0*   BILIRUBIN mg/dL 0.7   ALK PHOS U/L 209*   AST (SGOT) U/L 20   ALT (SGPT) U/L 10     Results from last 7 days   Lab Units 03/23/23  0453 03/22/23  2335 03/22/23  1830   CALCIUM mg/dL 8.3* 8.9 8.9   ALBUMIN g/dL  --  3.0*  --      Results from last 7 days   Lab Units 03/22/23  2335 03/22/23  1830   PROCALCITONIN ng/mL 1.89*  --    LACTATE mmol/L  --  1.2     Glucose   Date/Time Value Ref Range Status   03/23/2023 1130 169 (H) 70 - 130 mg/dL Final     Comment:     Meter: [id number] : [id number] Danielle HILLS       MRI Foot Left Without Contrast    Result Date: 3/23/2023  Soft tissue wound along the plantar side of the foot with a sinus tract which extends toward the first webspace. There is diffuse soft tissue edema as well as fairly extensive soft tissue gas around the lateral half of the great toe, the medial base of the second toe, and extending into the soft tissue dorsal to the second metatarsal head. No focal abscess is present. There is no joint effusion or MRI evidence of osteomyelitis.  This report was finalized on 3/23/2023 11:38 AM by Dr. Suman Baez M.D.      I have personally reviewed all medications:  Scheduled Medications  insulin lispro, 0-14 Units, Subcutaneous, TID AC  piperacillin-tazobactam, 3.375 g, Intravenous, Q8H  sodium chloride, 10 mL, Intravenous, Q12H  Vancomycin Pharmacy Intermittent/Pulse Dosing, , Does not apply, Daily    Infusions  Pharmacy to dose vancomycin,   sodium chloride, 100 mL/hr, Last Rate: 100 mL/hr (03/23/23 0114)    Diet  NPO Diet NPO Type: Sips with Meds  NPO Diet NPO Type: Strict NPO    I have personally reviewed:  [x]  Laboratory   [x]   Microbiology   [x]  Radiology   [x]  EKG/Telemetry  [x]  Cardiology/Vascular   [x]  Pathology    [x]  Records       Assessment/Plan     Active Hospital Problems    Diagnosis  POA   • **Cellulitis of left foot [L03.116]  Yes   • Hyponatremia [E87.1]  Unknown   • 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       1. Sepsis/left diabetic foot infection, currently patient is on vancomycin and Zosyn and will be continued.  Infectious disease consult has been obtained and MRI has been ordered as well.  Vascular surgery is following along and there is a concern regarding metatarsal-phalangeal joint involvement and plan is for to resection later today or tomorrow.  2.  Diabetes mellitus, continue with home dose Tresiba and corrective dose insulin.  3.  Hypertension, on amlodipine, HCTZ, Cozaar, Bystolic, terazosin which be continued.  4.  Neuropathy, on Lyrica.  5.  BPH, on finasteride and Flomax.  6.  Anemia of chronic disease, no evidence of an active bleeding.  Monitor H&H closely.  7.  CKD stage III, creatinine is at baseline.  8.  Clear-cell carcinoma of the kidney, advised him to follow-up with primary oncology as an outpatient basis      Dani Young MD  [facility]  03/23/23  13:50 EDT

## 2023-03-23 NOTE — PLAN OF CARE
Goal Outcome Evaluation:      VSS. RA. Pulses dopplerable, marked. Ax1 w/ walker, up to toilet. Family @ bedside. No complaints of pain. NS @ 100 mL/hr. Zosyn IV antibx. L foot +2 edema. NPO @ midnight. Pt complains of no pain.

## 2023-03-23 NOTE — ED NOTES
Nursing report ED to floor  Tomy Manjarrez  73 y.o.  male    HPI :   Chief Complaint   Patient presents with    Foot Pain    Numbness       Admitting doctor:   Maurice Mathias MD    Admitting diagnosis:   The primary encounter diagnosis was Cellulitis of left foot. A diagnosis of Diabetic foot infection (HCC) was also pertinent to this visit.    Code status:   Current Code Status       Date Active Code Status Order ID Comments User Context       Prior            Allergies:   Patient has no known allergies.    Isolation:   No active isolations    Intake and Output    Intake/Output Summary (Last 24 hours) at 3/22/2023 2154  Last data filed at 3/22/2023 1910  Gross per 24 hour   Intake 100 ml   Output --   Net 100 ml       Weight:       03/22/23  1711   Weight: 113 kg (250 lb)       Most recent vitals:   Vitals:    03/22/23 2049 03/22/23 2105 03/22/23 2106 03/22/23 2152   BP:   109/83    BP Location:       Patient Position:       Pulse: 82 83     Resp:    24   Temp:    99.2 °F (37.3 °C)   TempSrc:    Oral   SpO2: 93%  91%    Weight:       Height:           Active LDAs/IV Access:   Lines, Drains & Airways       Active LDAs       Name Placement date Placement time Site Days    Peripheral IV 03/22/23 1830 Left Antecubital 03/22/23 1830  Antecubital  less than 1    Peripheral IV 03/22/23 2057 Left Hand 03/22/23 2057  Hand  less than 1                    Labs (abnormal labs have a star):   Labs Reviewed   SEDIMENTATION RATE - Abnormal; Notable for the following components:       Result Value    Sed Rate 67 (*)     All other components within normal limits   C-REACTIVE PROTEIN - Abnormal; Notable for the following components:    C-Reactive Protein 22.87 (*)     All other components within normal limits   BASIC METABOLIC PANEL - Abnormal; Notable for the following components:    Glucose 219 (*)     BUN 49 (*)     Creatinine 3.12 (*)     Sodium 131 (*)     Chloride 96 (*)     eGFR 20.2 (*)     All other  components within normal limits    Narrative:     GFR Normal >60  Chronic Kidney Disease <60  Kidney Failure <15    The GFR formula is only valid for adults with stable renal function between ages 18 and 70.   CBC WITH AUTO DIFFERENTIAL - Abnormal; Notable for the following components:    WBC 15.53 (*)     RBC 4.00 (*)     Hemoglobin 11.5 (*)     Hematocrit 34.0 (*)     All other components within normal limits   MANUAL DIFFERENTIAL - Abnormal; Notable for the following components:    Neutrophil % 87.0 (*)     Lymphocyte % 7.0 (*)     Monocyte % 3.0 (*)     Basophil % 2.0 (*)     Neutrophils Absolute 13.51 (*)     Basophils Absolute 0.31 (*)     All other components within normal limits   LACTIC ACID, PLASMA - Normal   BLOOD CULTURE   BLOOD CULTURE   CBC AND DIFFERENTIAL    Narrative:     The following orders were created for panel order CBC & Differential.  Procedure                               Abnormality         Status                     ---------                               -----------         ------                     CBC Auto Differential[[id number]]        Abnormal            Final result                 Please view results for these tests on the individual orders.       EKG:   No orders to display       Meds given in ED:   Medications   vancomycin 2250 mg/500 mL 0.9% NS IVPB ([facility]) (2,250 mg Intravenous New Bag 3/22/23 1910)   acetaminophen (TYLENOL) tablet 1,000 mg (1,000 mg Oral Given 3/22/23 1831)   piperacillin-tazobactam (ZOSYN) 4.5 g in iso-osmotic dextrose 100 mL IVPB (premix) (0 g Intravenous Stopped 3/22/23 1910)   lactated ringers bolus 1,000 mL (1,000 mL Intravenous New Bag 3/22/23 1913)       Imaging results:  No radiology results for the last day    Ambulatory status:   -     Social issues:   Social History     Socioeconomic History    Marital status: Single    Number of children: 2    Years of education: 14   Tobacco Use    Smoking status: Former     Years: 1.00     Types: Cigarettes      Quit date: 2008     Years since quitting: 15.2    Smokeless tobacco: Never   Vaping Use    Vaping Use: Never used   Substance and Sexual Activity    Alcohol use: Not Currently     Alcohol/week: 14.0 - 21.0 standard drinks     Types: 14 - 21 Shots of liquor per week    Drug use: Not Currently    Sexual activity: Defer       NIH Stroke Scale:         Milka Pinto RN  03/22/23 21:54 EDT

## 2023-03-23 NOTE — CONSULTS
Patient Identification:  NAME:  Tomy Manjarrez  Age:  73 y.o.   Sex:  male   :  1950   MRN:  [id number]       Chief complaint: He does not have 1, reason for consult neuropathy    History of present illness: Patient is 73-year-old right-handed black male with longstanding diabetes mellitus and numbness in his feet.  Also, hyperlipidemia, hypertension.  He comes to the hospital with an infected left foot consistent with cellulitis.  He states it started with a lesion on the bottom of the foot and it got worse and worse.  He is now on antibiotics as a modifying factor.  The location is the left foot that is with cellulitis and reddened.  I am called to see him for his neuropathy which he has had for years.  He states he has numbness in both feet.  Cannot feel things but does not have burning or shooting pain.  Associated symptoms he has had some twitching in his arms that was worse yesterday and better today when he is holding something.  Context patient has acute cellulitis and is being treated with antibiotics quality has been this in the feet but no burning or bowel bladder changes      Past medical history:  Past Medical History:   Diagnosis Date   • Arthritis    • BPH (benign prostatic hyperplasia)    • Chronic back pain    • Chronic kidney disease (CKD), stage III (moderate) ([facility])    • Diabetes mellitus (HCC)     TYPE 2   • Difficulty urinating     REQUIRING STRAIGHT CATH   • GERD (gastroesophageal reflux disease)    • Gout    • Hyperlipidemia    • Hypertension    • Left kidney mass    • Neuropathy     IN FEET   • Neuropathy in diabetes (HCC)    • Type 2 diabetes mellitus (HCC)        Past surgical history:  Past Surgical History:   Procedure Laterality Date   • CARDIAC CATHETERIZATION     • COLONOSCOPY     • CYSTOSCOPY TRANSURETHRAL RESECTION OF PROSTATE N/A 2021    Procedure: CYSTOSCOPY TRANSURETHRAL RESECTION OF PROSTATE TRANSURETHRAL INCISION OF PROSTATE;  Surgeon: Rajesh  Tres GALDAMEZ MD;  Location: [facility];  Service: Urology;  Laterality: N/A;   • NEPHRECTOMY PARTIAL Left 11/19/2019    Procedure: LEFT PARTIAL NEPHRECTOMY X2, INTRAOPERATIVE RENAL ULTRASONOGRAPHY;  Surgeon: Tres Mena MD;  Location: [facility];  Service: Urology   • WISDOM TOOTH EXTRACTION         Allergies:  Patient has no known allergies.    Home medications:  Medications Prior to Admission   Medication Sig Dispense Refill Last Dose   • ACCU-CHEK FASTCLIX LANCETS misc TEST BEFORE MEALS AND AT BEDTIME 102 each 0 3/22/2023   • allopurinol (ZYLOPRIM) 100 MG tablet Take 1 tablet by mouth Every Evening.   3/22/2023   • amitriptyline (ELAVIL) 50 MG tablet TAKE ONE (1) TABLET BY MOUTH EVERY NIGHT AT BEDTIME   3/22/2023   • amLODIPine (NORVASC) 10 MG tablet Take 1 tablet by mouth Every Morning.   3/22/2023   • atorvastatin (LIPITOR) 40 MG tablet Take 1 tablet by mouth Every Night.   3/22/2023   • bimatoprost (LUMIGAN) 0.03 % ophthalmic drops Administer 1 drop to both eyes Every Night.   Past Week   • Cholecalciferol (Vitamin D3) 50 MCG (2000 UT) capsule TAKE ONE (1) CAPSULE BY MOUTH EVERY DAY 30 capsule 12 3/22/2023   • clotrimazole (LOTRIMIN) 1 % cream APPLY TO THE AFFECTED AREA TOPICALLY TWICE DAILY   Past Week   • DULoxetine (CYMBALTA) 60 MG capsule    3/22/2023   • fenofibrate 160 MG tablet Take 1 tablet by mouth Daily.   3/22/2023   • ferrous sulfate 324 (65 Fe) MG tablet delayed-release EC tablet Take 1 tablet by mouth Daily With Breakfast. HOLD FOR SURGERY   3/22/2023   • finasteride (PROSCAR) 5 MG tablet TAKE ONE (1) TABLET BY MOUTH EVERY DAY   3/22/2023   • glucose blood (Accu-Chek Guide) test strip Test BS three times daily.  DX E11.65 100 each 5 3/22/2023   • glucose blood test strip TEST BEFORE MEALS AND AT BEDTIME 100 each 0 3/22/2023   • HYDROcodone-acetaminophen (NORCO)  MG per tablet Take 1 tablet by mouth Every 4 (Four) Hours As Needed for Moderate Pain.   3/22/2023   •  insulin aspart (NovoLOG FlexPen) 100 UNIT/ML solution pen-injector sc pen Inject 7 Units under the skin into the appropriate area as directed 3 (Three) Times a Day Before Meals. 15 mL 3 3/22/2023   • insulin degludec (Tresiba FlexTouch) 100 UNIT/ML solution pen-injector injection Inject 20 Units under the skin into the appropriate area as directed Every Night. 15 mL 3 3/22/2023   • Insulin Pen Needle (Easy Touch Pen Needles) 31G X 6 MM misc 1 each by Other route 4 (Four) Times a Day. 400 each 3 3/22/2023   • losartan-hydrochlorothiazide (HYZAAR) 100-25 MG per tablet Take 1 tablet by mouth Daily.   3/22/2023   • Lumigan 0.01 % ophthalmic drops    Past Week   • nebivolol (BYSTOLIC) 10 MG tablet Take 1 tablet by mouth Every Morning.   3/22/2023   • omeprazole (priLOSEC) 40 MG capsule Take 1 capsule by mouth Every Night.   3/22/2023   • Ozempic, 1 MG/DOSE, 4 MG/3ML solution pen-injector INJECT 1 MG ONCE WEEKLY   Past Week   • pregabalin (LYRICA) 200 MG capsule Take 1 capsule by mouth.   3/22/2023   • pregabalin (LYRICA) 300 MG capsule TAKE ONE (1) CAPSULE BY MOUTH TWICE DAILY   3/22/2023   • Semaglutide, 1 MG/DOSE, (OZEMPIC) 2 MG/1.5ML solution pen-injector Inject 1 mg under the skin into the appropriate area as directed 1 (One) Time Per Week. 1.5 mL 6 Past Week   • tamsulosin (FLOMAX) 0.4 MG capsule 24 hr capsule TAKE ONE (1) CAPSULE BY MOUTH EVERY DAY   3/22/2023   • terazosin (HYTRIN) 2 MG capsule Take 1 capsule by mouth every night at bedtime.   3/21/2023   • tiZANidine (ZANAFLEX) 4 MG tablet Take 1 tablet by mouth Every Night.   3/22/2023   • traZODone (DESYREL) 100 MG tablet Take 1 tablet by mouth Every Night. 2 tablet at night   3/21/2023   • triamcinolone (KENALOG) 0.5 % cream Apply 1 application topically to the appropriate area as directed Daily.   3/21/2023        Hospital medications:  insulin lispro, 0-14 Units, Subcutaneous, TID AC  piperacillin-tazobactam, 3.375 g, Intravenous, Q8H  sodium chloride, 10  mL, Intravenous, Q12H  vancomycin, 1,000 mg, Intravenous, Once  Vancomycin Pharmacy Intermittent/Pulse Dosing, , Does not apply, Daily      Pharmacy to dose vancomycin,   sodium chloride, 100 mL/hr, Last Rate: 100 mL/hr (03/23/23 0114)      •  acetaminophen  •  dextrose  •  dextrose  •  glucagon (human recombinant)  •  nitroglycerin  •  ondansetron **OR** ondansetron  •  Pharmacy to dose vancomycin  •  sodium chloride  •  sodium chloride    Family history:  Family History   Problem Relation Age of Onset   • Diabetes Paternal Grandmother    • Diabetes Paternal Grandfather    • Hypertension Maternal Grandmother    • Cancer Neg Hx    • Malig Hyperthermia Neg Hx        Social history:  Social History     Tobacco Use   • Smoking status: Former     Years: 1.00     Types: Cigarettes     Quit date: 2008     Years since quitting: 15.2   • Smokeless tobacco: Never   Vaping Use   • Vaping Use: Never used   Substance Use Topics   • Alcohol use: Not Currently     Alcohol/week: 14.0 - 21.0 standard drinks     Types: 14 - 21 Shots of liquor per week   • Drug use: Not Currently       Review of systems:    He has had longstanding numbness in his feet but no shooting pain or burning pain.  He was dropping things a day or so ago when he first came in with some jerking muscles but that is resolved.  No current hair eyes ears nose throat  bone joint  lymphatic hematologic oncologic complaints no chest pain abdominal pain bowel bladder incontinence fever chills or rash.  Does have cellulitis that is being treated for the left foot    Objective:  Vitals Ranges:   Temp:  [98.4 °F (36.9 °C)-102.6 °F (39.2 °C)] 99.9 °F (37.7 °C)  Heart Rate:  [73-98] 78  Resp:  [15-24] 18  BP: (105-148)/(50-83) 128/62      Physical Exam:  Patient is awake alert oriented x3 in no distress fund of knowledge good attention span concentration good recent remote memory good language function normal well-developed well-nourished no distress pupils 3 constricting  to 2-1/2 extraocular movements full without nystagmus nasolabial folds palate tongue symmetrical normal hearing facial sensation head turning shoulder shrug motor normal x4 extremities.  He does have swelling and erythema in the left foot.  No rigidity bradykinesia resting tremor.  No myoclonus noted.  Reflexes trace throughout symmetrical toes downgoing bilaterally loss of light touch in both feet in a stocking glove sensory distribution.  Coordination normal in the upper extremities because of his left foot cellulitis.  I did not check Station or gait.  Heart is regular without murmur neck supple without bruits extremities no clubbing cyanosis or significant edema visual acuity normal at 3 feet    Results review:   I reviewed the patient's new clinical results.    Data review:  Lab Results (last 24 hours)     Procedure Component Value Units Date/Time    POC Glucose Once [[id number]]  (Abnormal) Collected: 03/23/23 1130    Specimen: Blood Updated: 03/23/23 1133     Glucose 169 mg/dL      Comment: Meter: [id number] : [id number] Danielle HILLS       Basic Metabolic Panel [[id number]]  (Abnormal) Collected: 03/23/23 0453    Specimen: Blood Updated: 03/23/23 0551     Glucose 196 mg/dL      BUN 46 mg/dL      Creatinine 2.72 mg/dL      Sodium 134 mmol/L      Potassium 4.1 mmol/L      Chloride 100 mmol/L      CO2 25.0 mmol/L      Calcium 8.3 mg/dL      BUN/Creatinine Ratio 16.9     Anion Gap 9.0 mmol/L      eGFR 23.8 mL/min/1.73     Narrative:      GFR Normal >60  Chronic Kidney Disease <60  Kidney Failure <15    The GFR formula is only valid for adults with stable renal function between ages 18 and 70.    Vancomycin, Random [[id number]]  (Normal) Collected: 03/23/23 0453    Specimen: Blood Updated: 03/23/23 0551     Vancomycin Random 19.30 mcg/mL     Narrative:      Therapeutic Ranges for Vancomycin    Vancomycin Random   5.0-40.0 mcg/mL  Vancomycin Trough   5.0-20.0 mcg/mL  Vancomycin Peak     20.0-40.0 mcg/mL    CBC  "Auto Differential [[id number]]  (Abnormal) Collected: 03/23/23 0453    Specimen: Blood Updated: 03/23/23 0539     WBC 15.23 10*3/mm3      RBC 3.88 10*6/mm3      Hemoglobin 11.0 g/dL      Hematocrit 33.0 %      MCV 85.1 fL      MCH 28.4 pg      MCHC 33.3 g/dL      RDW 13.6 %      RDW-SD 41.8 fl      MPV 13.7 fL      Platelets 166 10*3/mm3      Neutrophil % 76.9 %      Lymphocyte % 10.2 %      Monocyte % 10.6 %      Eosinophil % 1.2 %      Basophil % 0.5 %      Neutrophils, Absolute 11.71 10*3/mm3      Lymphocytes, Absolute 1.56 10*3/mm3      Monocytes, Absolute 1.61 10*3/mm3      Eosinophils, Absolute 0.18 10*3/mm3      Basophils, Absolute 0.08 10*3/mm3     Procalcitonin [[id number]]  (Abnormal) Collected: 03/22/23 2335    Specimen: Blood Updated: 03/23/23 0127     Procalcitonin 1.89 ng/mL     Narrative:      As a Marker for Sepsis (Non-Neonates):    1. <0.5 ng/mL represents a low risk of severe sepsis and/or septic shock.  2. >2 ng/mL represents a high risk of severe sepsis and/or septic shock.    As a Marker for Lower Respiratory Tract Infections that require antibiotic therapy:    PCT on Admission    Antibiotic Therapy       6-12 Hrs later    >0.5                Strongly Recommended  >0.25 - <0.5        Recommended   0.1 - 0.25          Discouraged              Remeasure/reassess PCT  <0.1                Strongly Discouraged     Remeasure/reassess PCT    As 28 day mortality risk marker: \"Change in Procalcitonin Result\" (>80% or <=80%) if Day 0 (or Day 1) and Day 4 values are available. Refer to http://[email or web address]-pct-[email or web address]    Change in PCT <=80%  A decrease of PCT levels below or equal to 80% defines a positive change in PCT test result representing a higher risk for 28-day all-cause mortality of patients diagnosed with severe sepsis for septic shock.    Change in PCT >80%  A decrease of PCT levels of more than 80% defines a negative change in PCT result representing a lower risk for 28-day all-cause " mortality of patients diagnosed with severe sepsis or septic shock.       Comprehensive Metabolic Panel [[id number]]  (Abnormal) Collected: 03/22/23 2335    Specimen: Blood Updated: 03/23/23 0121     Glucose 218 mg/dL      BUN 48 mg/dL      Creatinine 2.96 mg/dL      Sodium 134 mmol/L      Potassium 4.2 mmol/L      Chloride 100 mmol/L      CO2 20.8 mmol/L      Calcium 8.9 mg/dL      Total Protein 6.8 g/dL      Albumin 3.0 g/dL      ALT (SGPT) 10 U/L      AST (SGOT) 20 U/L      Alkaline Phosphatase 209 U/L      Total Bilirubin 0.7 mg/dL      Globulin 3.8 gm/dL      A/G Ratio 0.8 g/dL      BUN/Creatinine Ratio 16.2     Anion Gap 13.2 mmol/L      eGFR 21.5 mL/min/1.73     Narrative:      GFR Normal >60  Chronic Kidney Disease <60  Kidney Failure <15    The GFR formula is only valid for adults with stable renal function between ages 18 and 70.    Basic Metabolic Panel [[id number]]  (Abnormal) Collected: 03/22/23 1830    Specimen: Blood Updated: 03/22/23 2135     Glucose 219 mg/dL      BUN 49 mg/dL      Creatinine 3.12 mg/dL      Sodium 131 mmol/L      Potassium 4.4 mmol/L      Chloride 96 mmol/L      CO2 23.0 mmol/L      Calcium 8.9 mg/dL      BUN/Creatinine Ratio 15.7     Anion Gap 12.0 mmol/L      eGFR 20.2 mL/min/1.73     Narrative:      GFR Normal >60  Chronic Kidney Disease <60  Kidney Failure <15    The GFR formula is only valid for adults with stable renal function between ages 18 and 70.    Manual Differential [[id number]]  (Abnormal) Collected: 03/22/23 1830    Specimen: Blood Updated: 03/22/23 2048     Neutrophil % 87.0 %      Lymphocyte % 7.0 %      Monocyte % 3.0 %      Eosinophil % 1.0 %      Basophil % 2.0 %      Neutrophils Absolute 13.51 10*3/mm3      Lymphocytes Absolute 1.09 10*3/mm3      Monocytes Absolute 0.47 10*3/mm3      Eosinophils Absolute 0.16 10*3/mm3      Basophils Absolute 0.31 10*3/mm3      RBC Morphology Normal     WBC Morphology Normal     Platelet Morphology Normal    CBC & Differential  [[id number]]  (Abnormal) Collected: 03/22/23 1830    Specimen: Blood Updated: 03/22/23 2024    Narrative:      The following orders were created for panel order CBC & Differential.  Procedure                               Abnormality         Status                     ---------                               -----------         ------                     CBC Auto Differential[[id number]]        Abnormal            Final result                 Please view results for these tests on the individual orders.    CBC Auto Differential [[id number]]  (Abnormal) Collected: 03/22/23 1830    Specimen: Blood Updated: 03/22/23 2024     WBC 15.53 10*3/mm3      RBC 4.00 10*6/mm3      Hemoglobin 11.5 g/dL      Hematocrit 34.0 %      MCV 85.0 fL      MCH 28.8 pg      MCHC 33.8 g/dL      RDW 13.8 %      RDW-SD 42.4 fl      Platelets 176 10*3/mm3     C-reactive Protein [[id number]]  (Abnormal) Collected: 03/22/23 1830    Specimen: Blood Updated: 03/22/23 1927     C-Reactive Protein 22.87 mg/dL     Lactic Acid, Plasma [[id number]]  (Normal) Collected: 03/22/23 1830    Specimen: Blood Updated: 03/22/23 1902     Lactate 1.2 mmol/L     Sedimentation Rate [[id number]]  (Abnormal) Collected: 03/22/23 1830    Specimen: Blood Updated: 03/22/23 1857     Sed Rate 67 mm/hr     Blood Culture - Blood, Arm, Right [[id number]] Collected: 03/22/23 1830    Specimen: Blood from Arm, Right Updated: 03/22/23 1840    Blood Culture - Blood, Arm, Left [[id number]] Collected: 03/22/23 1830    Specimen: Blood from Arm, Left Updated: 03/22/23 1840           Imaging:  Imaging Results (Last 24 Hours)     Procedure Component Value Units Date/Time    MRI Foot Left Without Contrast [[id number]] Collected: 03/23/23 1131     Updated: 03/23/23 1141    Narrative:      MRI LEFT MID AND FOREFOOT WITHOUT CONTRAST     HISTORY: Wound along the plantar side of the great toe for three or four  weeks.     TECHNIQUE: MRI left mid and forefoot is provided and correlated with  x-rays  performed yesterday.     FINDINGS: Signal dropout representing soft tissue gas is more subtle on  this series than on the x-ray from yesterday but it localizes to the  dorsal and lateral aspects of the great toe and the medial second toe  base extending into the web space between the toes to the level of the  metatarsal heads. The most lateral, proximal focus of what appears to be  soft tissue gas is observed dorsal to the second toe extensor tendon at  the level of the metatarsal head. There is soft tissue edema throughout  the mid and forefoot and on the short axis T2 images. The soft tissue  wound is observed along the plantar side of the foot with heterogeneous  fluid signal extending deep into the soft tissue toward the the first  webspace. No focal fluid collection is present.     The first metatarsophalangeal joint is deformed by advanced  osteoarthritis. The joints of the midfoot and the other joints in the  forefoot are preserved. There is no joint effusion.     Foot musculature is diffusely deconditioned. The flexor and extensor  tendons appear normal. There is no abnormal tendon sheath effusion.       Impression:      Soft tissue wound along the plantar side of the foot with a  sinus tract which extends toward the first webspace. There is diffuse  soft tissue edema as well as fairly extensive soft tissue gas around the  lateral half of the great toe, the medial base of the second toe, and  extending into the soft tissue dorsal to the second metatarsal head. No  focal abscess is present. There is no joint effusion or MRI evidence of  osteomyelitis.     This report was finalized on 3/23/2023 11:38 AM by Dr. Suman Baez M.D.       XR Foot 3+ View Left [[id number]] Collected: 03/22/23 1806     Updated: 03/22/23 1813    Narrative:      XR FOOT 3+ VW LEFT-     HISTORY: 73-year-old male with erythema.     FINDINGS: There is air within the soft tissues at the ventral aspect of  the foot at the level of the  base of the 1st proximal phalanx. On the  frontal projection, the air is predominantly at the lateral aspect of  the proximal phalanx. No radiopaque foreign body is seen within the soft  tissues. No convincing evidence is seen for osteomyelitis. There are  advanced advanced osteoarthritic changes at the 1st MTP joint.     This report was finalized on 3/22/2023 6:10 PM by Dr. Lynne Lizarraga M.D.            PPE worn at all times washed before washed up afterwards disposed of everything properly is not within 6 feet of them for more than few minutes during my exam no aerosols used at any point    Assessment and Plan:       Cellulitis of left foot    Type 2 diabetes mellitus with hyperglycemia, with long-term current use of insulin ([facility])    Hypertension    MARTIN (acute kidney injury) ([facility])    CKD (chronic kidney disease) stage 3, GFR 30-59 ml/min ([facility])    Essential hypertension    Clear cell carcinoma of kidney ([facility])    Hyponatremia  Patient has a peripheral neuropathy that is almost certainly secondary to his longstanding type 2 diabetes.  He has not been a drinker of alcohol.  There is no evidence of a stroke or a myelopathy.  Have no doubt that the decreased sensation is what led to the initial foot lesion that led to the cellulitis.  It is being treated appropriately at this time    Note that although both feet are numb.  He does not have burning pain shooting pain or really any pain associated with them and I would not be in favor of starting any new type medication as it will not change the numbness.  He already has.    He also has some motor impersistence and myoclonus/asterixis that is better but was present secondary to the cellulitis, and possibly secondary to the antibiotics.  He does not have any significant movement disorder at this time and does not appear to be parkinsonian.    At this point neurology will not add any new medications and we will follow-up.  Reconsult thanks      Suman Duncan,  MD  03/23/23  12:32 EDT

## 2023-03-24 ENCOUNTER — ANESTHESIA EVENT (OUTPATIENT)
Dept: PERIOP | Facility: HOSPITAL | Age: 73
DRG: 854 | End: 2023-03-24
Payer: MEDICARE

## 2023-03-24 ENCOUNTER — ANESTHESIA (OUTPATIENT)
Dept: PERIOP | Facility: HOSPITAL | Age: 73
DRG: 854 | End: 2023-03-24
Payer: MEDICARE

## 2023-03-24 LAB
ANION GAP SERPL CALCULATED.3IONS-SCNC: 10 MMOL/L (ref 5–15)
BASOPHILS # BLD AUTO: 0.08 10*3/MM3 (ref 0–0.2)
BASOPHILS NFR BLD AUTO: 0.5 % (ref 0–1.5)
BUN SERPL-MCNC: 44 MG/DL (ref 8–23)
BUN/CREAT SERPL: 17.4 (ref 7–25)
CALCIUM SPEC-SCNC: 8.6 MG/DL (ref 8.6–10.5)
CHLORIDE SERPL-SCNC: 104 MMOL/L (ref 98–107)
CO2 SERPL-SCNC: 23 MMOL/L (ref 22–29)
CREAT SERPL-MCNC: 2.53 MG/DL (ref 0.76–1.27)
DEPRECATED RDW RBC AUTO: 43 FL (ref 37–54)
EGFRCR SERPLBLD CKD-EPI 2021: 25.9 ML/MIN/1.73
EOSINOPHIL # BLD AUTO: 0.21 10*3/MM3 (ref 0–0.4)
EOSINOPHIL NFR BLD AUTO: 1.4 % (ref 0.3–6.2)
ERYTHROCYTE [DISTWIDTH] IN BLOOD BY AUTOMATED COUNT: 13.9 % (ref 12.3–15.4)
GLUCOSE BLDC GLUCOMTR-MCNC: 159 MG/DL (ref 70–130)
GLUCOSE BLDC GLUCOMTR-MCNC: 180 MG/DL (ref 70–130)
GLUCOSE BLDC GLUCOMTR-MCNC: 190 MG/DL (ref 70–130)
GLUCOSE BLDC GLUCOMTR-MCNC: 209 MG/DL (ref 70–130)
GLUCOSE BLDC GLUCOMTR-MCNC: 210 MG/DL (ref 70–130)
GLUCOSE SERPL-MCNC: 193 MG/DL (ref 65–99)
HCT VFR BLD AUTO: 29.4 % (ref 37.5–51)
HGB BLD-MCNC: 10 G/DL (ref 13–17.7)
LYMPHOCYTES # BLD AUTO: 1.32 10*3/MM3 (ref 0.7–3.1)
LYMPHOCYTES NFR BLD AUTO: 8.6 % (ref 19.6–45.3)
MCH RBC QN AUTO: 29.3 PG (ref 26.6–33)
MCHC RBC AUTO-ENTMCNC: 34 G/DL (ref 31.5–35.7)
MCV RBC AUTO: 86.2 FL (ref 79–97)
MONOCYTES # BLD AUTO: 1.47 10*3/MM3 (ref 0.1–0.9)
MONOCYTES NFR BLD AUTO: 9.6 % (ref 5–12)
NEUTROPHILS NFR BLD AUTO: 12.05 10*3/MM3 (ref 1.7–7)
NEUTROPHILS NFR BLD AUTO: 78.9 % (ref 42.7–76)
PLATELET # BLD AUTO: 167 10*3/MM3 (ref 140–450)
PMV BLD AUTO: 13.3 FL (ref 6–12)
POTASSIUM SERPL-SCNC: 4 MMOL/L (ref 3.5–5.2)
RBC # BLD AUTO: 3.41 10*6/MM3 (ref 4.14–5.8)
SODIUM SERPL-SCNC: 137 MMOL/L (ref 136–145)
VANCOMYCIN SERPL-MCNC: 15.6 MCG/ML (ref 5–40)
WBC NRBC COR # BLD: 15.29 10*3/MM3 (ref 3.4–10.8)

## 2023-03-24 PROCEDURE — 80202 ASSAY OF VANCOMYCIN: CPT | Performed by: NURSE PRACTITIONER

## 2023-03-24 PROCEDURE — 25010000002 FENTANYL CITRATE (PF) 50 MCG/ML SOLUTION: Performed by: STUDENT IN AN ORGANIZED HEALTH CARE EDUCATION/TRAINING PROGRAM

## 2023-03-24 PROCEDURE — 87077 CULTURE AEROBIC IDENTIFY: CPT | Performed by: SURGERY

## 2023-03-24 PROCEDURE — 80048 BASIC METABOLIC PNL TOTAL CA: CPT | Performed by: INTERNAL MEDICINE

## 2023-03-24 PROCEDURE — 25010000002 ROPIVACAINE PER 1 MG: Performed by: STUDENT IN AN ORGANIZED HEALTH CARE EDUCATION/TRAINING PROGRAM

## 2023-03-24 PROCEDURE — 25010000002 DEXAMETHASONE PER 1 MG: Performed by: STUDENT IN AN ORGANIZED HEALTH CARE EDUCATION/TRAINING PROGRAM

## 2023-03-24 PROCEDURE — 94640 AIRWAY INHALATION TREATMENT: CPT

## 2023-03-24 PROCEDURE — 25010000002 CEFAZOLIN PER 500 MG: Performed by: SURGERY

## 2023-03-24 PROCEDURE — 85025 COMPLETE CBC W/AUTO DIFF WBC: CPT | Performed by: INTERNAL MEDICINE

## 2023-03-24 PROCEDURE — 87205 SMEAR GRAM STAIN: CPT | Performed by: SURGERY

## 2023-03-24 PROCEDURE — 94799 UNLISTED PULMONARY SVC/PX: CPT

## 2023-03-24 PROCEDURE — 0KBW0ZZ EXCISION OF LEFT FOOT MUSCLE, OPEN APPROACH: ICD-10-PCS | Performed by: SURGERY

## 2023-03-24 PROCEDURE — 0Y9N0ZZ DRAINAGE OF LEFT FOOT, OPEN APPROACH: ICD-10-PCS | Performed by: SURGERY

## 2023-03-24 PROCEDURE — 63710000001 INSULIN LISPRO (HUMAN) PER 5 UNITS: Performed by: NURSE PRACTITIONER

## 2023-03-24 PROCEDURE — 99232 SBSQ HOSP IP/OBS MODERATE 35: CPT | Performed by: STUDENT IN AN ORGANIZED HEALTH CARE EDUCATION/TRAINING PROGRAM

## 2023-03-24 PROCEDURE — 25010000002 MIDAZOLAM PER 1 MG: Performed by: STUDENT IN AN ORGANIZED HEALTH CARE EDUCATION/TRAINING PROGRAM

## 2023-03-24 PROCEDURE — 25010000002 PIPERACILLIN SOD-TAZOBACTAM PER 1 G: Performed by: SURGERY

## 2023-03-24 PROCEDURE — 25010000002 PIPERACILLIN SOD-TAZOBACTAM PER 1 G: Performed by: NURSE PRACTITIONER

## 2023-03-24 PROCEDURE — 82962 GLUCOSE BLOOD TEST: CPT

## 2023-03-24 PROCEDURE — 63710000001 INSULIN LISPRO (HUMAN) PER 5 UNITS: Performed by: SURGERY

## 2023-03-24 PROCEDURE — 25010000002 PROPOFOL 10 MG/ML EMULSION

## 2023-03-24 PROCEDURE — 94664 DEMO&/EVAL PT USE INHALER: CPT

## 2023-03-24 PROCEDURE — 87075 CULTR BACTERIA EXCEPT BLOOD: CPT | Performed by: SURGERY

## 2023-03-24 PROCEDURE — 87176 TISSUE HOMOGENIZATION CULTR: CPT | Performed by: SURGERY

## 2023-03-24 PROCEDURE — 87070 CULTURE OTHR SPECIMN AEROBIC: CPT | Performed by: SURGERY

## 2023-03-24 PROCEDURE — 94761 N-INVAS EAR/PLS OXIMETRY MLT: CPT

## 2023-03-24 PROCEDURE — 87186 SC STD MICRODIL/AGAR DIL: CPT | Performed by: SURGERY

## 2023-03-24 RX ORDER — DEXAMETHASONE SODIUM PHOSPHATE 4 MG/ML
INJECTION, SOLUTION INTRA-ARTICULAR; INTRALESIONAL; INTRAMUSCULAR; INTRAVENOUS; SOFT TISSUE
Status: COMPLETED | OUTPATIENT
Start: 2023-03-24 | End: 2023-03-24

## 2023-03-24 RX ORDER — ONDANSETRON 2 MG/ML
4 INJECTION INTRAMUSCULAR; INTRAVENOUS EVERY 6 HOURS PRN
Status: DISCONTINUED | OUTPATIENT
Start: 2023-03-24 | End: 2023-03-24

## 2023-03-24 RX ORDER — SODIUM CHLORIDE 0.9 % (FLUSH) 0.9 %
3-10 SYRINGE (ML) INJECTION AS NEEDED
Status: DISCONTINUED | OUTPATIENT
Start: 2023-03-24 | End: 2023-03-24 | Stop reason: HOSPADM

## 2023-03-24 RX ORDER — VANCOMYCIN HYDROCHLORIDE 1 G/200ML
1000 INJECTION, SOLUTION INTRAVENOUS EVERY 24 HOURS
Status: DISCONTINUED | OUTPATIENT
Start: 2023-03-24 | End: 2023-03-24

## 2023-03-24 RX ORDER — IPRATROPIUM BROMIDE AND ALBUTEROL SULFATE 2.5; .5 MG/3ML; MG/3ML
3 SOLUTION RESPIRATORY (INHALATION)
Status: COMPLETED | OUTPATIENT
Start: 2023-03-24 | End: 2023-03-26

## 2023-03-24 RX ORDER — PROPOFOL 10 MG/ML
VIAL (ML) INTRAVENOUS AS NEEDED
Status: DISCONTINUED | OUTPATIENT
Start: 2023-03-24 | End: 2023-03-24 | Stop reason: SURG

## 2023-03-24 RX ORDER — HYDROMORPHONE HYDROCHLORIDE 1 MG/ML
0.5 INJECTION, SOLUTION INTRAMUSCULAR; INTRAVENOUS; SUBCUTANEOUS
Status: ACTIVE | OUTPATIENT
Start: 2023-03-24 | End: 2023-03-31

## 2023-03-24 RX ORDER — SODIUM CHLORIDE, SODIUM LACTATE, POTASSIUM CHLORIDE, CALCIUM CHLORIDE 600; 310; 30; 20 MG/100ML; MG/100ML; MG/100ML; MG/100ML
9 INJECTION, SOLUTION INTRAVENOUS CONTINUOUS
Status: DISCONTINUED | OUTPATIENT
Start: 2023-03-24 | End: 2023-03-31 | Stop reason: HOSPADM

## 2023-03-24 RX ORDER — LIDOCAINE HYDROCHLORIDE 20 MG/ML
INJECTION, SOLUTION INFILTRATION; PERINEURAL AS NEEDED
Status: DISCONTINUED | OUTPATIENT
Start: 2023-03-24 | End: 2023-03-24 | Stop reason: SURG

## 2023-03-24 RX ORDER — HYDROCODONE BITARTRATE AND ACETAMINOPHEN 7.5; 325 MG/1; MG/1
1 TABLET ORAL EVERY 4 HOURS PRN
Status: DISCONTINUED | OUTPATIENT
Start: 2023-03-24 | End: 2023-03-28 | Stop reason: SDUPTHER

## 2023-03-24 RX ORDER — PHENYLEPHRINE HCL IN 0.9% NACL 1 MG/10 ML
SYRINGE (ML) INTRAVENOUS AS NEEDED
Status: DISCONTINUED | OUTPATIENT
Start: 2023-03-24 | End: 2023-03-24 | Stop reason: SURG

## 2023-03-24 RX ORDER — NITROGLYCERIN 0.4 MG/1
0.4 TABLET SUBLINGUAL
Status: DISCONTINUED | OUTPATIENT
Start: 2023-03-24 | End: 2023-03-24

## 2023-03-24 RX ORDER — PROPOFOL 10 MG/ML
VIAL (ML) INTRAVENOUS CONTINUOUS PRN
Status: DISCONTINUED | OUTPATIENT
Start: 2023-03-24 | End: 2023-03-24 | Stop reason: SURG

## 2023-03-24 RX ORDER — FENTANYL CITRATE 50 UG/ML
50 INJECTION, SOLUTION INTRAMUSCULAR; INTRAVENOUS
Status: DISCONTINUED | OUTPATIENT
Start: 2023-03-24 | End: 2023-03-24 | Stop reason: HOSPADM

## 2023-03-24 RX ORDER — LIDOCAINE HYDROCHLORIDE 10 MG/ML
0.5 INJECTION, SOLUTION EPIDURAL; INFILTRATION; INTRACAUDAL; PERINEURAL ONCE AS NEEDED
Status: DISCONTINUED | OUTPATIENT
Start: 2023-03-24 | End: 2023-03-24 | Stop reason: HOSPADM

## 2023-03-24 RX ORDER — SODIUM CHLORIDE 0.9 % (FLUSH) 0.9 %
3 SYRINGE (ML) INJECTION EVERY 12 HOURS SCHEDULED
Status: DISCONTINUED | OUTPATIENT
Start: 2023-03-24 | End: 2023-03-24 | Stop reason: HOSPADM

## 2023-03-24 RX ORDER — VANCOMYCIN HYDROCHLORIDE 1 G/200ML
1000 INJECTION, SOLUTION INTRAVENOUS EVERY 24 HOURS
Status: DISCONTINUED | OUTPATIENT
Start: 2023-03-24 | End: 2023-03-25

## 2023-03-24 RX ORDER — ONDANSETRON 4 MG/1
4 TABLET, FILM COATED ORAL EVERY 6 HOURS PRN
Status: DISCONTINUED | OUTPATIENT
Start: 2023-03-24 | End: 2023-03-24

## 2023-03-24 RX ORDER — ROPIVACAINE HYDROCHLORIDE 5 MG/ML
INJECTION, SOLUTION EPIDURAL; INFILTRATION; PERINEURAL
Status: COMPLETED | OUTPATIENT
Start: 2023-03-24 | End: 2023-03-24

## 2023-03-24 RX ORDER — MIDAZOLAM HYDROCHLORIDE 1 MG/ML
1 INJECTION INTRAMUSCULAR; INTRAVENOUS
Status: DISCONTINUED | OUTPATIENT
Start: 2023-03-24 | End: 2023-03-24 | Stop reason: HOSPADM

## 2023-03-24 RX ORDER — GLYCOPYRROLATE 0.2 MG/ML
INJECTION INTRAMUSCULAR; INTRAVENOUS AS NEEDED
Status: DISCONTINUED | OUTPATIENT
Start: 2023-03-24 | End: 2023-03-24 | Stop reason: SURG

## 2023-03-24 RX ORDER — NALOXONE HCL 0.4 MG/ML
0.4 VIAL (ML) INJECTION
Status: DISCONTINUED | OUTPATIENT
Start: 2023-03-24 | End: 2023-03-31 | Stop reason: HOSPADM

## 2023-03-24 RX ORDER — ENOXAPARIN SODIUM 100 MG/ML
40 INJECTION SUBCUTANEOUS DAILY
Status: DISCONTINUED | OUTPATIENT
Start: 2023-03-25 | End: 2023-03-28 | Stop reason: SDUPTHER

## 2023-03-24 RX ORDER — MIDAZOLAM HYDROCHLORIDE 1 MG/ML
1 INJECTION INTRAMUSCULAR; INTRAVENOUS ONCE
Status: DISCONTINUED | OUTPATIENT
Start: 2023-03-24 | End: 2023-03-24

## 2023-03-24 RX ADMIN — TAMSULOSIN HYDROCHLORIDE 0.4 MG: 0.4 CAPSULE ORAL at 08:04

## 2023-03-24 RX ADMIN — TAZOBACTAM SODIUM AND PIPERACILLIN SODIUM 3.38 G: 375; 3 INJECTION, SOLUTION INTRAVENOUS at 00:35

## 2023-03-24 RX ADMIN — Medication 100 MCG: at 14:38

## 2023-03-24 RX ADMIN — ATORVASTATIN CALCIUM 40 MG: 20 TABLET, FILM COATED ORAL at 21:08

## 2023-03-24 RX ADMIN — DEXAMETHASONE SODIUM PHOSPHATE 4 MG: 4 INJECTION, SOLUTION INTRA-ARTICULAR; INTRALESIONAL; INTRAMUSCULAR; INTRAVENOUS; SOFT TISSUE at 12:22

## 2023-03-24 RX ADMIN — IPRATROPIUM BROMIDE AND ALBUTEROL SULFATE 3 ML: .5; 2.5 SOLUTION RESPIRATORY (INHALATION) at 17:48

## 2023-03-24 RX ADMIN — PROPOFOL 20 MG: 10 INJECTION, EMULSION INTRAVENOUS at 14:10

## 2023-03-24 RX ADMIN — INSULIN LISPRO 5 UNITS: 100 INJECTION, SOLUTION INTRAVENOUS; SUBCUTANEOUS at 17:13

## 2023-03-24 RX ADMIN — INSULIN LISPRO 3 UNITS: 100 INJECTION, SOLUTION INTRAVENOUS; SUBCUTANEOUS at 08:05

## 2023-03-24 RX ADMIN — INSULIN GLARGINE-YFGN 20 UNITS: 100 INJECTION, SOLUTION SUBCUTANEOUS at 21:08

## 2023-03-24 RX ADMIN — PROPOFOL 30 MG: 10 INJECTION, EMULSION INTRAVENOUS at 14:15

## 2023-03-24 RX ADMIN — FENOFIBRATE 145 MG: 145 TABLET, FILM COATED ORAL at 08:05

## 2023-03-24 RX ADMIN — LOSARTAN POTASSIUM 100 MG: 100 TABLET, FILM COATED ORAL at 08:04

## 2023-03-24 RX ADMIN — Medication 100 MCG: at 14:46

## 2023-03-24 RX ADMIN — FERROUS SULFATE TAB 325 MG (65 MG ELEMENTAL FE) 325 MG: 325 (65 FE) TAB at 08:05

## 2023-03-24 RX ADMIN — AMLODIPINE BESYLATE 10 MG: 10 TABLET ORAL at 06:12

## 2023-03-24 RX ADMIN — LIDOCAINE HYDROCHLORIDE 60 MG: 20 INJECTION, SOLUTION INFILTRATION; PERINEURAL at 14:10

## 2023-03-24 RX ADMIN — TAZOBACTAM SODIUM AND PIPERACILLIN SODIUM 3.38 G: 375; 3 INJECTION, SOLUTION INTRAVENOUS at 08:05

## 2023-03-24 RX ADMIN — PREGABALIN 200 MG: 100 CAPSULE ORAL at 08:05

## 2023-03-24 RX ADMIN — ROPIVACAINE HYDROCHLORIDE 24 ML: 5 INJECTION, SOLUTION EPIDURAL; INFILTRATION; PERINEURAL at 12:22

## 2023-03-24 RX ADMIN — TAZOBACTAM SODIUM AND PIPERACILLIN SODIUM 3.38 G: 375; 3 INJECTION, SOLUTION INTRAVENOUS at 17:13

## 2023-03-24 RX ADMIN — DULOXETINE HYDROCHLORIDE 60 MG: 60 CAPSULE, DELAYED RELEASE ORAL at 08:05

## 2023-03-24 RX ADMIN — PANTOPRAZOLE SODIUM 40 MG: 40 TABLET, DELAYED RELEASE ORAL at 06:12

## 2023-03-24 RX ADMIN — TRAZODONE HYDROCHLORIDE 100 MG: 100 TABLET ORAL at 21:08

## 2023-03-24 RX ADMIN — AMITRIPTYLINE HYDROCHLORIDE 25 MG: 25 TABLET, FILM COATED ORAL at 21:08

## 2023-03-24 RX ADMIN — FENTANYL CITRATE 50 MCG: 50 INJECTION, SOLUTION INTRAMUSCULAR; INTRAVENOUS at 12:22

## 2023-03-24 RX ADMIN — MIDAZOLAM 1 MG: 1 INJECTION INTRAMUSCULAR; INTRAVENOUS at 15:26

## 2023-03-24 RX ADMIN — HYDROCHLOROTHIAZIDE 25 MG: 25 TABLET ORAL at 08:04

## 2023-03-24 RX ADMIN — GLYCOPYRROLATE 0.1 MG: 0.2 INJECTION INTRAMUSCULAR; INTRAVENOUS at 14:10

## 2023-03-24 RX ADMIN — FINASTERIDE 5 MG: 5 TABLET, FILM COATED ORAL at 08:05

## 2023-03-24 RX ADMIN — NEBIVOLOL 10 MG: 10 TABLET ORAL at 06:12

## 2023-03-24 RX ADMIN — ALLOPURINOL 100 MG: 100 TABLET ORAL at 17:13

## 2023-03-24 RX ADMIN — Medication 10 ML: at 08:07

## 2023-03-24 RX ADMIN — Medication 120 MCG/KG/MIN: at 14:10

## 2023-03-24 RX ADMIN — TIZANIDINE 4 MG: 4 TABLET ORAL at 21:08

## 2023-03-24 RX ADMIN — IPRATROPIUM BROMIDE AND ALBUTEROL SULFATE 3 ML: .5; 2.5 SOLUTION RESPIRATORY (INHALATION) at 23:08

## 2023-03-24 NOTE — OP NOTE
Operative Note  Date of Admission:  3/22/2023  OR Date: 3/24/2023    Pre-op Diagnosis:   Left diabetic foot infection with mal perforans ulceration    Post-Op Diagnosis Codes:  Same    Procedure:   1) left foot incision and drainage and  Sharp excisional debridement of skin, subcutaneous tissue and muscle and tendon 7 x 4 x 3 cm    Surgeon: Ruel Oh MD    Assistant: Marita Ortiz and they provided critical assistance during the case including suctioning, exposure, retraction, and reduction of blood loss.    Anesthesia: Monitored Anesthesia Care with [facility]    Staff:   Circulator: Lois Staples RN; Maria C Treadwell RN  Scrub Person: Caron Gutierres  Assistant: Marita Ortiz, NATHAN  Other: Brenda Rosa RN    Estimated Blood Loss: minimal    Specimens:   Order Name Source Comment Collection Info Order Time   ANAEROBIC CULTURE Toe, Left  Collected By: Baltazar Oh MD 3/24/2023  2:40 PM     Release to patient   Routine Release        TISSUE / BONE CULTURE Toe, Left  Collected By: Baltazar Oh MD 3/24/2023  2:40 PM     Release to patient   Routine Release             Complications: None    Findings: Gross necrotic tissue and infection between the toes debrided without obvious bone involvement.    Indications:    The patient is an 73 y.o. male seen for evaluation necrotizing soft tissue infection coming from mal perforans ulceration and extending between the toes on MRI and clinically.  Debridement is needed and the patient understands the possibility of tissue loss including loss of toe's.  He understands and agrees to the plan to try to salvage as much tissue as possible.  He understands the possibility of the need for furthe surgery and/or debridements and/or amputations.  He and his family agreed to the procedure       Procedure:    Patient was prepped and draped sterilely.  Using incision extending from the base of the mal perforans ulcer along the area of tracking tissue purulence we opened the  area between the first and second toe findings large amount of pus and necrotic tissue in this area that was nonviable.  Surprisingly the bony aspects of the first metatarsal phalangeal joint as well as the second metatarsal phalangeal joint appeared intact with ligaments and no evidence of purulence extending into the joint space.  Bones were covered with periosteum and were seen visibly but did not seem to be grossly infected.  All necrotic tissue was rongeured out with large and small rongeur and cleaned with irrigation.  We excised sharply skin subcutaneous tissue muscle and tendon but no bone was taken.  Total size of the wound was 7 cm x 4 cm x 3 cm.  Cultures were taken and the irrigation was pursued and then the patient had Betadine packing performed.  Counterincisions over the dorsum of the foot and laterally on the great toe were made to make sure there is no pus in these areas.  Tolerated the procedure well.          Active Hospital Problems    Diagnosis  POA   • **Cellulitis of left foot [L03.116]  Yes   • Hyponatremia [E87.1]  Unknown   • Clear cell carcinoma of kidney (HCC) [C64.9]  Yes   • Essential hypertension [I10]  Yes   • Type 2 diabetes mellitus with hyperglycemia, with long-term current use of insulin (HCC) [E11.65, Z79.4]  Not Applicable   • Hypertension [I10]  Yes   • MARTIN (acute kidney injury) ([facility]) [N17.9]  Yes   • CKD (chronic kidney disease) stage 3, GFR 30-59 ml/min ([facility]) [N18.30]  Yes      Resolved Hospital Problems   No resolved problems to display.      Baltazar Oh MD     Date: 3/24/2023  Time: 15:08 EDT

## 2023-03-24 NOTE — ANESTHESIA PREPROCEDURE EVALUATION
Anesthesia Evaluation     Patient summary reviewed and Nursing notes reviewed   no history of anesthetic complications:  NPO Solid Status: > 8 hours  NPO Liquid Status: > 2 hours           Airway   Mallampati: II  TM distance: >3 FB  Neck ROM: full  Dental      Pulmonary    Cardiovascular     ECG reviewed    (+) hypertension, hyperlipidemia,       Neuro/Psych  GI/Hepatic/Renal/Endo    (+) obesity,  GERD,  renal disease CRI, diabetes mellitus,     Musculoskeletal     Abdominal    Substance History      OB/GYN          Other   arthritis, blood dyscrasia anemia,                     Anesthesia Plan    ASA 3     MAC and regional     intravenous induction     Anesthetic plan, risks, benefits, and alternatives have been provided, discussed and informed consent has been obtained with: patient.        CODE STATUS:    Code Status (Patient has no pulse and is not breathing): CPR (Attempt to Resuscitate)  Medical Interventions (Patient has pulse or is breathing): Full Support

## 2023-03-24 NOTE — ANESTHESIA POSTPROCEDURE EVALUATION
"Patient: Tomy Manjarrez    Procedure Summary     Date: 03/24/23 Room / Location: [facility]  / [facility] OR    Anesthesia Start: 1407 Anesthesia Stop: 1507    Procedure: LEFT FOOT INCISION AND DRAINAGE (Left) Diagnosis:     Surgeons: Baltazar Oh MD Provider: Baltazar Anderson MD    Anesthesia Type: MAC, regional ASA Status: 3          Anesthesia Type: MAC, regional    Vitals  Vitals Value Taken Time   /55 03/24/23 1601   Temp 37.2 °C (98.9 °F) 03/24/23 1503   Pulse 71 03/24/23 1605   Resp 16 03/24/23 1545   SpO2 96 % 03/24/23 1605   Vitals shown include unvalidated device data.        Post Anesthesia Care and Evaluation    Patient location during evaluation: PACU  Patient participation: complete - patient participated  Level of consciousness: awake and alert  Pain management: adequate    Airway patency: patent  Anesthetic complications: No anesthetic complications  PONV Status: controlled  Cardiovascular status: acceptable  Respiratory status: acceptable  Hydration status: acceptable    Comments: /91 (BP Location: Right arm, Patient Position: Lying)   Pulse 70   Temp 37.2 °C (98.9 °F) (Oral)   Resp 16   Ht 190.5 cm (75\")   Wt 123 kg (271 lb 9.7 oz) Comment: weighed with 2 pillows 1 blanket 1 sheet and the extention of the bed  SpO2 96%   BMI 33.95 kg/m²         "

## 2023-03-24 NOTE — PROGRESS NOTES
"[facility] Clinical Pharmacy Services: Vancomycin Monitoring Note    Tomy Manjarrez is a 73 y.o. male who is on day 3/5 of pharmacy to dose vancomycin for SSTI.    Previous Vancomycin Dose: intermitting dosing, last dose 1 g   Updated Cultures and Sensitivities: 03/22 BCX no growth at 24 hrs   Results from last 7 days   Lab Units 03/24/23  0527 03/23/23  0453   VANCOMYCIN RM mcg/mL 15.60 19.30     Vitals/Labs  Ht: 190.5 cm (75\"); Wt: 123 kg (271 lb 9.7 oz)   Temp Readings from Last 1 Encounters:   03/24/23 98.2 °F (36.8 °C)     Estimated Creatinine Clearance: 36.2 mL/min (A) (by C-G formula based on SCr of 2.53 mg/dL (H)).     Results from last 7 days   Lab Units 03/24/23  0527 03/23/23  0453 03/22/23  2335 03/22/23  1830   CREATININE mg/dL 2.53* 2.72* 2.96* 3.12*   WBC 10*3/mm3 15.29* 15.23*  --  15.53*     Assessment/Plan  Patient with CKD3, Scr currently elevated from what appears to be baseline, but is improving from admission value. Random today was therapeutic 15.6 , Scr continues to improve will start scheduled dosing.    Start Vancomycin Dose: 1 g q24, predicted    Next Level Date and Time: Trough 3/26 at 1130, duration TBD  We will continue to monitor patient changes and renal function     Thank you for involving pharmacy in this patient's care. Please contact pharmacy with any questions or concerns.       Roxanna Zapien, PharmD  Clinical Pharmacist    "

## 2023-03-24 NOTE — PROGRESS NOTES
Name: Tomy Manjarrez ADMIT: 3/22/2023   : 1950  PCP: Zoya Claros PA    MRN: [id number] LOS: 2 days   AGE/SEX: 73 y.o. male  ROOM: [facility] OR/MAIN OR     Subjective   Subjective     Patient is lying in the bed and does not appear to be any major distress.  Appears weak and lethargic.  Denies nausea, vomiting, abdominal pain, chest pain.       Objective   Objective   Vital Signs  Temp:  [98.2 °F (36.8 °C)-101.4 °F (38.6 °C)] 99.7 °F (37.6 °C)  Heart Rate:  [69-96] 75  Resp:  [18-22] 18  BP: (102-144)/(47-63) 115/62  SpO2:  [91 %-100 %] 97 %  on  Flow (L/min):  [2] 2;   Device (Oxygen Therapy): nasal cannula  Body mass index is 33.95 kg/m².  Physical Exam  Constitutional:       General: He is not in acute distress.     Appearance: He is ill-appearing.   HENT:      Head: Normocephalic and atraumatic.      Mouth/Throat:      Mouth: Mucous membranes are moist.   Eyes:      Pupils: Pupils are equal, round, and reactive to light.   Cardiovascular:      Rate and Rhythm: Normal rate and regular rhythm.      Pulses: Normal pulses.      Heart sounds: Normal heart sounds.   Pulmonary:      Effort: Pulmonary effort is normal.      Breath sounds: Normal breath sounds.   Abdominal:      General: Bowel sounds are normal. There is no distension.      Palpations: Abdomen is soft.      Tenderness: There is no abdominal tenderness.   Musculoskeletal:      Cervical back: Normal range of motion and neck supple.      Comments: Left foot edema and erythema noted   Skin:     General: Skin is warm and dry.   Neurological:      General: No focal deficit present.      Mental Status: He is alert and oriented to person, place, and time.       Results Review     I reviewed the patient's new clinical results.  Results from last 7 days   Lab Units 23  0527 23  0453 23  1830   WBC 10*3/mm3 15.29* 15.23* 15.53*   HEMOGLOBIN g/dL 10.0* 11.0* 11.5*   PLATELETS 10*3/mm3 167 166 176     Results from last 7  days   Lab Units 03/24/23  0527 03/23/23  0453 03/22/23  2335 03/22/23  1830   SODIUM mmol/L 137 134* 134* 131*   POTASSIUM mmol/L 4.0 4.1 4.2 4.4   CHLORIDE mmol/L 104 100 100 96*   CO2 mmol/L 23.0 25.0 20.8* 23.0   BUN mg/dL 44* 46* 48* 49*   CREATININE mg/dL 2.53* 2.72* 2.96* 3.12*   GLUCOSE mg/dL 193* 196* 218* 219*   EGFR mL/min/1.73 25.9* 23.8* 21.5* 20.2*     Results from last 7 days   Lab Units 03/22/23  2335   ALBUMIN g/dL 3.0*   BILIRUBIN mg/dL 0.7   ALK PHOS U/L 209*   AST (SGOT) U/L 20   ALT (SGPT) U/L 10     Results from last 7 days   Lab Units 03/24/23  0527 03/23/23 0453 03/22/23  2335 03/22/23  1830   CALCIUM mg/dL 8.6 8.3* 8.9 8.9   ALBUMIN g/dL  --   --  3.0*  --      Results from last 7 days   Lab Units 03/22/23  2335 03/22/23  1830   PROCALCITONIN ng/mL 1.89*  --    LACTATE mmol/L  --  1.2     Glucose   Date/Time Value Ref Range Status   03/24/2023 1105 159 (H) 70 - 130 mg/dL Final     Comment:     Meter: [id number] : [id number] Loki Aquino RN   03/24/2023 0540 190 (H) 70 - 130 mg/dL Final     Comment:     Meter: [id number] : [id number] Alicante Lesley Teresa S NA   03/23/2023 2108 182 (H) 70 - 130 mg/dL Final     Comment:     Meter: [id number] : [id number] Alicante Lesley Teresa S NA   03/23/2023 1635 168 (H) 70 - 130 mg/dL Final     Comment:     Meter: [id number] : [id number] Muscogeecam Grandea NA   03/23/2023 1130 169 (H) 70 - 130 mg/dL Final     Comment:     Meter: [id number] : [id number] Danielle HILLS       MRI Foot Left Without Contrast    Result Date: 3/23/2023  Soft tissue wound along the plantar side of the foot with a sinus tract which extends toward the first webspace. There is diffuse soft tissue edema as well as fairly extensive soft tissue gas around the lateral half of the great toe, the medial base of the second toe, and extending into the soft tissue dorsal to the second metatarsal head. No focal abscess is present. There is no joint effusion or MRI  evidence of osteomyelitis.  This report was finalized on 3/23/2023 11:38 AM by Dr. Suman Baez M.D.      I have personally reviewed all medications:  Scheduled Medications  [MAR Hold] allopurinol, 100 mg, Oral, Q PM  [MAR Hold] amitriptyline, 25 mg, Oral, Nightly  amLODIPine, 10 mg, Oral, QAM  [MAR Hold] atorvastatin, 40 mg, Oral, Nightly  [MAR Hold] DULoxetine, 60 mg, Oral, Daily  [MAR Hold] fenofibrate, 145 mg, Oral, Daily  [MAR Hold] ferrous sulfate, 325 mg, Oral, Daily With Breakfast  [MAR Hold] finasteride, 5 mg, Oral, Daily  [MAR Hold] losartan, 100 mg, Oral, Q24H   And  [MAR Hold] hydroCHLOROthiazide, 25 mg, Oral, Q24H  [MAR Hold] insulin glargine, 20 Units, Subcutaneous, Nightly  [MAR Hold] insulin lispro, 0-14 Units, Subcutaneous, TID AC  nebivolol, 10 mg, Oral, QAM  [MAR Hold] pantoprazole, 40 mg, Oral, Q AM  piperacillin-tazobactam, 3.375 g, Intravenous, Q8H  pregabalin, 200 mg, Oral, Daily  [MAR Hold] sodium chloride, 10 mL, Intravenous, Q12H  sodium chloride, 3 mL, Intravenous, Q12H  [MAR Hold] tamsulosin, 0.4 mg, Oral, Daily  terazosin, 2 mg, Oral, Nightly  [MAR Hold] tiZANidine, 4 mg, Oral, Nightly  [MAR Hold] traZODone, 100 mg, Oral, Nightly  vancomycin, 1,000 mg, Intravenous, Q24H    Infusions  lactated ringers, 9 mL/hr  Pharmacy to dose vancomycin,   sodium chloride, 100 mL/hr, Last Rate: 100 mL/hr (03/24/23 1404)    Diet  NPO Diet NPO Type: Strict NPO    I have personally reviewed:  [x]  Laboratory   [x]  Microbiology   [x]  Radiology   [x]  EKG/Telemetry  [x]  Cardiology/Vascular   [x]  Pathology    [x]  Records       Assessment/Plan     Active Hospital Problems    Diagnosis  POA   • **Cellulitis of left foot [L03.116]  Yes   • Hyponatremia [E87.1]  Unknown   • Clear cell carcinoma of kidney (HCC) [C64.9]  Yes   • Essential hypertension [I10]  Yes   • Type 2 diabetes mellitus with hyperglycemia, with long-term current use of insulin (HCC) [E11.65, Z79.4]  Not Applicable   • Hypertension [I10]   Yes   • MARTIN (acute kidney injury) ([facility]) [N17.9]  Yes   • CKD (chronic kidney disease) stage 3, GFR 30-59 ml/min ([facility]) [N18.30]  Yes      Resolved Hospital Problems   No resolved problems to display.       1. Sepsis/left diabetic foot infection, currently patient is on vancomycin and Zosyn and will be continued. Vascular surgery is following along and there is a concern regarding metatarsal-phalangeal joint involvement and plan is to go to the OR today.  MRI suggestive of extensive soft tissue involvement.  Appreciate vascular and infectious disease input.    2.  Diabetes mellitus, continue with home dose Tresiba and corrective dose insulin.    3.  Hypertension, on amlodipine, HCTZ, Cozaar, Bystolic, terazosin which be continued.    4.  Neuropathy, on Lyrica.    5.  BPH, on finasteride and Flomax.    6.  Anemia of chronic disease, no evidence of an active bleeding.  Monitor H&H closely.    7.  CKD stage III, creatinine is at baseline.    8.  Clear-cell carcinoma of the kidney, advised him to follow-up with primary oncology as an outpatient basis    Copied text on this note has been reviewed by me on 3/24/2023      Dani Young MD  [facility]  03/24/23  14:36 EDT

## 2023-03-24 NOTE — PROGRESS NOTES
Name: Tomy Manjarrez ADMIT: 3/22/2023   : 1950  PCP: Zoya Claros PA    MRN: [id number] LOS: 2 days   AGE/SEX: 73 y.o. male  ROOM: [address]    [place], Subsequent Hospital Care    Chief Complaint   Patient presents with   • Foot Pain   • Numbness     CC: Left diabetic foot infection  Subjective     73 y.o. male with diabetic foot infection associated with cellulitis and mal perforans ulcer.  Surprisingly MRI and plain films do not show bone infection at this time.  No septic arthritis either.  Today we will attempt debridement without toe amputation if possible.  I discussed with the patient he is aware the toe amputation may be necessary depending on the amount of soft tissue removed.  We will do our best to try to preserve this for him.  His perfusion seems adequate to heal although it is not quite as good as the right side.  Tibial intervention may be indicated if there is a question.    Review of Systems no new complaints    Objective     Scheduled Medications:   allopurinol, 100 mg, Oral, Q PM  amitriptyline, 25 mg, Oral, Nightly  amLODIPine, 10 mg, Oral, QAM  atorvastatin, 40 mg, Oral, Nightly  DULoxetine, 60 mg, Oral, Daily  fenofibrate, 145 mg, Oral, Daily  ferrous sulfate, 325 mg, Oral, Daily With Breakfast  finasteride, 5 mg, Oral, Daily  losartan, 100 mg, Oral, Q24H   And  hydroCHLOROthiazide, 25 mg, Oral, Q24H  insulin glargine, 20 Units, Subcutaneous, Nightly  insulin lispro, 0-14 Units, Subcutaneous, TID AC  nebivolol, 10 mg, Oral, QAM  pantoprazole, 40 mg, Oral, Q AM  piperacillin-tazobactam, 3.375 g, Intravenous, Q8H  pregabalin, 200 mg, Oral, Daily  sodium chloride, 10 mL, Intravenous, Q12H  tamsulosin, 0.4 mg, Oral, Daily  terazosin, 2 mg, Oral, Nightly  tiZANidine, 4 mg, Oral, Nightly  traZODone, 100 mg, Oral, Nightly  Vancomycin Pharmacy Intermittent/Pulse Dosing, , Does not apply, Daily        Active Infusions:  Pharmacy to dose vancomycin,   sodium  chloride, 100 mL/hr, Last Rate: 100 mL/hr (03/23/23 1728)        As Needed Medications:  •  acetaminophen  •  dextrose  •  dextrose  •  glucagon (human recombinant)  •  nitroglycerin  •  ondansetron **OR** ondansetron  •  Pharmacy to dose vancomycin  •  sodium chloride  •  sodium chloride    Vital Signs  Vital Signs Patient Vitals for the past 24 hrs:   BP Temp Temp src Pulse Resp SpO2 Weight   03/24/23 0739 124/62 98.2 °F (36.8 °C) -- 73 -- 94 % --   03/24/23 0612 117/60 -- -- 75 -- -- --   03/24/23 0544 -- -- -- -- -- -- 123 kg (271 lb 9.7 oz)   03/24/23 0500 -- -- -- 71 -- -- --   03/24/23 0313 113/59 98.8 °F (37.1 °C) Oral 72 18 96 % --   03/23/23 2339 102/47 98.7 °F (37.1 °C) Oral 69 18 91 % --   03/23/23 2056 135/62 99.7 °F (37.6 °C) Oral 85 18 93 % --   03/23/23 1852 -- (!) 101.4 °F (38.6 °C) Axillary 96 -- -- --   03/23/23 1522 144/63 100 °F (37.8 °C) Oral 86 18 97 % --   03/23/23 1110 128/62 99.9 °F (37.7 °C) Oral 78 18 91 % --   03/23/23 0755 -- (!) 102.6 °F (39.2 °C) Oral -- -- -- --     Vital Signs (range)  Temp:  [98.2 °F (36.8 °C)-102.6 °F (39.2 °C)] 98.2 °F (36.8 °C)  Heart Rate:  [69-96] 73  Resp:  [18] 18  BP: (102-144)/(47-63) 124/62  I/O:  I/O last 3 completed shifts:  In: 3700 [P.O.:540; I.V.:1510; IV Piggyback:1650]  Out: 4525 [Urine:4525]  I/O:   Intake/Output Summary (Last 24 hours) at 3/24/2023 0753  Last data filed at 3/24/2023 0548  Gross per 24 hour   Intake 1300 ml   Output 2525 ml   Net -1225 ml     BMI:  Body mass index is 33.95 kg/m².    Physical Exam:  Physical Exam   Lungs coarse but equal  Heart regular rate and rhythm  Abdomen benign  Left foot cellulitis contained to the first toe area    Results Review:     CBC    Results from last 7 days   Lab Units 03/24/23  0527 03/23/23  0453 03/22/23  1830   WBC 10*3/mm3 15.29* 15.23* 15.53*   HEMOGLOBIN g/dL 10.0* 11.0* 11.5*   PLATELETS 10*3/mm3 167 166 176     BMP   Results from last 7 days   Lab Units 03/24/23 0527 03/23/23  0453  03/22/23  2335 03/22/23  1830   SODIUM mmol/L 137 134* 134* 131*   POTASSIUM mmol/L 4.0 4.1 4.2 4.4   CHLORIDE mmol/L 104 100 100 96*   CO2 mmol/L 23.0 25.0 20.8* 23.0   BUN mg/dL 44* 46* 48* 49*   CREATININE mg/dL 2.53* 2.72* 2.96* 3.12*   GLUCOSE mg/dL 193* 196* 218* 219*     Cr Clearance Estimated Creatinine Clearance: 36.2 mL/min (A) (by C-G formula based on SCr of 2.53 mg/dL (H)).  Coag     HbA1C   Lab Results   Component Value Date    HGBA1C 11.50 (H) 08/04/2022    HGBA1C 6.8 (H) 01/18/2022    HGBA1C 7.2 (H) 07/09/2021     Blood Glucose   Glucose   Date/Time Value Ref Range Status   03/24/2023 0540 190 (H) 70 - 130 mg/dL Final     Comment:     Meter: [id number] : [id number] Lillian THOMPSON NA   03/23/2023 2108 182 (H) 70 - 130 mg/dL Final     Comment:     Meter: [id number] : [id number] Lillian Moore S NA   03/23/2023 1635 168 (H) 70 - 130 mg/dL Final     Comment:     Meter: [id number] : [id number] Monroe Mmai NA   03/23/2023 1130 169 (H) 70 - 130 mg/dL Final     Comment:     Meter: [id number] : [id number] Monroe Mami NA     Infection   Results from last 7 days   Lab Units 03/22/23  2335 03/22/23  1830   BLOODCX   --  No growth at 24 hours  No growth at 24 hours   PROCALCITONIN ng/mL 1.89*  --      CMP   Results from last 7 days   Lab Units 03/24/23  0527 03/23/23  0453 03/22/23  2335 03/22/23  1830   SODIUM mmol/L 137 134* 134* 131*   POTASSIUM mmol/L 4.0 4.1 4.2 4.4   CHLORIDE mmol/L 104 100 100 96*   CO2 mmol/L 23.0 25.0 20.8* 23.0   BUN mg/dL 44* 46* 48* 49*   CREATININE mg/dL 2.53* 2.72* 2.96* 3.12*   GLUCOSE mg/dL 193* 196* 218* 219*   ALBUMIN g/dL  --   --  3.0*  --    BILIRUBIN mg/dL  --   --  0.7  --    ALK PHOS U/L  --   --  209*  --    AST (SGOT) U/L  --   --  20  --    ALT (SGPT) U/L  --   --  10  --      Radiology(recent) MRI Foot Left Without Contrast    Result Date: 3/23/2023  Soft tissue wound along the plantar side of the foot with a sinus tract which  extends toward the first webspace. There is diffuse soft tissue edema as well as fairly extensive soft tissue gas around the lateral half of the great toe, the medial base of the second toe, and extending into the soft tissue dorsal to the second metatarsal head. No focal abscess is present. There is no joint effusion or MRI evidence of osteomyelitis.  This report was finalized on 3/23/2023 11:38 AM by Dr. Suman Baez M.D.        Assessment & Plan     Assessment & Plan      Cellulitis of left foot    Type 2 diabetes mellitus with hyperglycemia, with long-term current use of insulin (HCC)    Hypertension    MARTIN (acute kidney injury) (HCC)    CKD (chronic kidney disease) stage 3, GFR 30-59 ml/min (HCC)    Essential hypertension    Clear cell carcinoma of kidney (HCC)    Hyponatremia      73 y.o. male debridement of foot this morning planned.  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

## 2023-03-24 NOTE — PLAN OF CARE
Goal Outcome Evaluation:     VSS. Afebrile. NPO at midnight for I/D with possible L first toe amputation. Consents to be signed in am when daughter (Elham) arrives. A/o x4 but forgetful at times. F/c in place. IV abx.

## 2023-03-24 NOTE — BRIEF OP NOTE
AMPUTATION DIGIT  Progress Note    Tomy Manjarrez  3/24/2023    Pre-op Diagnosis:   Diabetic foot infection       Post-Op Diagnosis Codes:  same    Procedure/CPT® Codes:        Procedure(s):  LEFT FOOT INCISION AND DRAINAGE full-thickness excisional sharp debridement of skin subcutaneous tissue and muscle and tendon.  7 x 4 x 3 cm        Surgeon(s):  Baltazar Oh MD    Anesthesia: Monitored Anesthesia Care with Regional    Staff:   Circulator: Lois Staples RN; Maria C Treadwell RN  Scrub Person: Caron Gutierres  Assistant: Marita Ortiz CST  Other: Brenda Rosa RN  Assistant: Marita Ortiz CST      Estimated Blood Loss: minimal    Urine Voided: * No values recorded between 3/24/2023  2:07 PM and 3/24/2023  2:52 PM *    Specimens:                Specimens     ID Source Type Tests Collected By Collected At Frozen?    1 Toe, Left Surgical Site · ANAEROBIC CULTURE  · TISSUE / BONE CULTURE   Baltazar Oh MD 3/24/23 1436     Description: Left diabetic toe infection wound culture    This specimen was not marked as sent.                Drains:   Urethral Catheter Straight-tip (Active)   Daily Indications Acute Urinary Retention 03/24/23 1115   Site Assessment Clean;Skin intact 03/24/23 1057   Collection Container Standard drainage bag 03/24/23 1115   Securement Method Securing device 03/24/23 1115   Catheter care complete Yes 03/23/23 2145   Output (mL) 875 mL 03/24/23 1200       Findings: Gross pus and necrotic tissue between the first and second digits        Complications: None    Assistant: Marita Ortiz CST  was responsible for performing the following activities: Retraction, Suction, Irrigation and Placing Dressing and their skilled assistance was necessary for the success of this case.    Baltazar Oh MD     Date: 3/24/2023  Time: 14:58 EDT

## 2023-03-24 NOTE — PROGRESS NOTES
Nutrition Services    Patient Name:  Tomy Manjarrez  YOB: 1950  MRN: [id number]  Admit Date:  3/22/2023    Assessment Date:  03/24/23    Comment: Nutrition assessment initiated due to diabetic foot infection.Pt currently in the OR for amputation of the toe. Po intake not well documented. No weight loss, Actually he has gained wt over the past few years. Labs, meds reviewed. Will follow at later time for any nutrition concerns.    CLINICAL NUTRITION ASSESSMENT      Reason for Assessment Pressure Injury and/or Non-Healing Wound     Diagnosis/Problem   Diabetic foot infection left foot   Medical/Surgical History Past Medical History:   Diagnosis Date   • Arthritis    • BPH (benign prostatic hyperplasia)    • Chronic back pain    • Chronic kidney disease (CKD), stage III (moderate) ([facility])    • Diabetes mellitus ([facility])     TYPE 2   • Difficulty urinating     REQUIRING STRAIGHT CATH   • GERD (gastroesophageal reflux disease)    • Gout    • Hyperlipidemia    • Hypertension    • Left kidney mass    • Neuropathy     IN FEET   • Neuropathy in diabetes (HCC)    • Type 2 diabetes mellitus (HCC)        Past Surgical History:   Procedure Laterality Date   • CARDIAC CATHETERIZATION  2016   • COLONOSCOPY     • CYSTOSCOPY TRANSURETHRAL RESECTION OF PROSTATE N/A 9/20/2021    Procedure: CYSTOSCOPY TRANSURETHRAL RESECTION OF PROSTATE TRANSURETHRAL INCISION OF PROSTATE;  Surgeon: Tres Mena MD;  Location: [facility];  Service: Urology;  Laterality: N/A;   • NEPHRECTOMY PARTIAL Left 11/19/2019    Procedure: LEFT PARTIAL NEPHRECTOMY X2, INTRAOPERATIVE RENAL ULTRASONOGRAPHY;  Surgeon: Tres Mena MD;  Location: [facility];  Service: Urology   • WISDOM TOOTH EXTRACTION          Encounter Information        Nutrition History:     Food Preferences:    Supplements:    Factors Affecting Intake: No factors at this time     Anthropometrics        Current Height  Current Weight  BMI kg/m2  "Height: 190.5 cm (75\")  Weight: 123 kg (271 lb 9.7 oz) (weighed with 2 pillows 1 blanket 1 sheet and the extention of the bed) (03/24/23 0544)  Body mass index is 33.95 kg/m².   Adjusted BMI (if applicable)        Admission Weight 123kg       Ideal Body Weight (IBW) 84.5kg   Adjusted IBW (if applicable)        Usual Body Weight (UBW)    Weight Change/Trend Gain       Weight History Wt Readings from Last 30 Encounters:   03/24/23 0544 123 kg (271 lb 9.7 oz)   03/22/23 2235 114 kg (252 lb 3.3 oz)   03/22/23 1711 113 kg (250 lb)   08/04/22 1057 110 kg (243 lb)   01/18/22 1057 104 kg (230 lb)   09/20/21 0618 95.7 kg (211 lb)   09/10/21 1346 100 kg (221 lb)   08/06/21 1325 109 kg (240 lb)   07/09/21 1056 108 kg (239 lb)   01/12/21 1031 110 kg (243 lb)   11/19/19 1326 99.7 kg (219 lb 12.8 oz)   11/12/19 1416 104 kg (230 lb 4 oz)             Estimated/Assessed Needs       Energy Requirements    Height for Calculation  Height: 190.5 cm (75\")   Weight for Calculation 123kg   Method for Estimation  20 kcal/kg   EST Needs (kcal/day) 2460       Protein Requirements    Weight for Calculation 84.5kg   EST Protein Needs (g/kg) 1.2 gm/kg   EST Daily Needs (g/day) 101       Fluid Requirements     Method for Estimation Defer to physician    Estimated Needs (mL/day)      Tests/Procedures        Tests/Procedures MRI     Labs       Pertinent Labs    Results from last 7 days   Lab Units 03/24/23  0527 03/23/23  0453 03/22/23  2335   SODIUM mmol/L 137 134* 134*   POTASSIUM mmol/L 4.0 4.1 4.2   CHLORIDE mmol/L 104 100 100   CO2 mmol/L 23.0 25.0 20.8*   BUN mg/dL 44* 46* 48*   CREATININE mg/dL 2.53* 2.72* 2.96*   CALCIUM mg/dL 8.6 8.3* 8.9   BILIRUBIN mg/dL  --   --  0.7   ALK PHOS U/L  --   --  209*   ALT (SGPT) U/L  --   --  10   AST (SGOT) U/L  --   --  20   GLUCOSE mg/dL 193* 196* 218*     Results from last 7 days   Lab Units 03/24/23  0527 03/23/23  0453 03/22/23  2335   HEMOGLOBIN g/dL 10.0*   < >  --    HEMATOCRIT % 29.4*   < >  --  "   WBC 10*3/mm3 15.29*   < >  --    ALBUMIN g/dL  --   --  3.0*    < > = values in this interval not displayed.     Results from last 7 days   Lab Units 03/24/23  0527 03/23/23  0453 03/22/23  1830   PLATELETS 10*3/mm3 167 166 176     COVID19   Date Value Ref Range Status   09/17/2021 Not Detected Not Detected - Ref. Range Final     Lab Results   Component Value Date    HGBA1C 11.50 (H) 08/04/2022          Medications           Scheduled Medications [MAR Hold] allopurinol, 100 mg, Oral, Q PM  [MAR Hold] amitriptyline, 25 mg, Oral, Nightly  amLODIPine, 10 mg, Oral, QAM  [MAR Hold] atorvastatin, 40 mg, Oral, Nightly  [MAR Hold] DULoxetine, 60 mg, Oral, Daily  [MAR Hold] fenofibrate, 145 mg, Oral, Daily  [MAR Hold] ferrous sulfate, 325 mg, Oral, Daily With Breakfast  [MAR Hold] finasteride, 5 mg, Oral, Daily  [MAR Hold] losartan, 100 mg, Oral, Q24H   And  [MAR Hold] hydroCHLOROthiazide, 25 mg, Oral, Q24H  [MAR Hold] insulin glargine, 20 Units, Subcutaneous, Nightly  [MAR Hold] insulin lispro, 0-14 Units, Subcutaneous, TID AC  nebivolol, 10 mg, Oral, QAM  [MAR Hold] pantoprazole, 40 mg, Oral, Q AM  piperacillin-tazobactam, 3.375 g, Intravenous, Q8H  pregabalin, 200 mg, Oral, Daily  [MAR Hold] sodium chloride, 10 mL, Intravenous, Q12H  sodium chloride, 3 mL, Intravenous, Q12H  [MAR Hold] tamsulosin, 0.4 mg, Oral, Daily  terazosin, 2 mg, Oral, Nightly  [MAR Hold] tiZANidine, 4 mg, Oral, Nightly  [MAR Hold] traZODone, 100 mg, Oral, Nightly  vancomycin, 1,000 mg, Intravenous, Q24H       Infusions lactated ringers, 9 mL/hr  Pharmacy to dose vancomycin,   sodium chloride, 100 mL/hr, Last Rate: 100 mL/hr (03/23/23 1728)       PRN Medications •  [MAR Hold] acetaminophen  •  [MAR Hold] dextrose  •  [MAR Hold] dextrose  •  fentanyl  •  [MAR Hold] glucagon (human recombinant)  •  lidocaine PF 1%  •  midazolam  •  [MAR Hold] nitroglycerin  •  [MAR Hold] ondansetron **OR** [MAR Hold] ondansetron  •  Pharmacy to dose vancomycin  •   [MAR Hold] sodium chloride  •  sodium chloride  •  [MAR Hold] sodium chloride     Physical Findings          Physical Appearance alert, obese, oriented   Oral/Mouth Cavity WNL   Edema  2+ (mild), 3+ (moderate)   Gastrointestinal last bowel movement:3/24   Skin  diabetic ulcer, non-healing wound(s) to foot   Tubes/Drains none   NFPE Not applicable at this time   --  Current Nutrition Orders & Evaluation of Intake       Oral Nutrition     Food Allergies NKFA   Current PO Diet NPO Diet NPO Type: Strict NPO   Supplement n/a   PO Evaluation     % PO Intake Not well documented    # of Days Evaluated    --  PES STATEMENT / NUTRITION DIAGNOSIS      Nutrition Dx Problem  Problem: Increased Nutrient Needs  Etiology: Medical Diagnosis Diabetic foot infection left foot  Signs/Symptoms: Report/Observation    Comment:    --  NUTRITION INTERVENTION / PLAN OF CARE      Intervention Goal(s) Maintain nutrition status, Meet estimated needs, Disease management/therapy, Tolerate PO  and No significant weight loss         RD Intervention/Action Await begin PO diet, Follow Tx Progress and Care plan reviewed         Prescription/Orders:       PO Diet       Supplements       Snacks       Enteral Nutrition       Parenteral Nutrition    New Prescription Ordered? No changes at this time   --      Monitor/Evaluation Per protocol, I&O, PO intake, Pertinent labs, Skin status, Symptoms   Discharge Plan/Needs Pending clinical course   Education Will instruct as appropriate   --    RD to follow per protocol.      Electronically signed by:  Adry Felipe RD  03/24/23 12:34 EDT

## 2023-03-24 NOTE — PLAN OF CARE
Goal Outcome Evaluation:  Plan of Care Reviewed With: patient        Progress: no change  Outcome Evaluation: VSS, pt went for I&D of L foot. L foot wrapped. No complaints.

## 2023-03-24 NOTE — ANESTHESIA PROCEDURE NOTES
Peripheral Block    Pre-sedation assessment completed: 3/24/2023 12:21 PM    Patient reassessed immediately prior to procedure    Patient location during procedure: pre-op  Start time: 3/24/2023 12:22 PM  Stop time: 3/24/2023 12:26 PM  Reason for block: at surgeon's request and post-op pain management  Performed by  Anesthesiologist: Orville Palafox MD  Preanesthetic Checklist  Completed: patient identified, IV checked, site marked, risks and benefits discussed, surgical consent, monitors and equipment checked, pre-op evaluation and timeout performed  Prep:  Pt Position: supine  Sterile barriers:mask, gloves and cap  Prep: ChloraPrep  Patient monitoring: blood pressure monitoring, continuous pulse oximetry and EKG  Procedure    Sedation: yes  Performed under: local infiltration  Guidance:ultrasound guided    ULTRASOUND INTERPRETATION.  Using ultrasound guidance a 21 G gauge needle was placed in close proximity to the nerve, at which point, under ultrasound guidance anesthetic was injected in the area of the nerve and spread of the anesthesia was seen on ultrasound in close proximity thereto.  There were no abnormalities seen on ultrasound; a digital image was taken; and the patient tolerated the procedure with no complications. Images:still images obtained, printed/placed on chart    Laterality:left  Block Type:popliteal  Injection Technique:single-shot  Needle Type:echogenic and Tuohy  Needle Gauge:21 G  Resistance on Injection: none    Medications Used: ropivacaine (NAROPIN) 0.5 % injection - Injection   24 mL - 3/24/2023 12:22:00 PM  dexamethasone (DECADRON) injection - Injection   4 mg - 3/24/2023 12:22:00 PM      Post Assessment  Injection Assessment: negative aspiration for heme, no paresthesia on injection and incremental injection  Patient Tolerance:comfortable throughout block  Complications:no  Additional Notes  Ultrasound guidance used to visualize nerve anatomy, guide needle placement and verify  local anesthetic disbursement.

## 2023-03-24 NOTE — PROGRESS NOTES
LOS: 2 days     Chief Complaint: Diabetic foot infection    Interval History: Patient reports doing well this morning.  N.p.o. for likely surgery.  Denies any difficulties with the antibiotics.  Tolerating them well.  Fever curve improving.  WBC stable.    Vital Signs  Temp:  [98.2 °F (36.8 °C)-101.4 °F (38.6 °C)] 98.2 °F (36.8 °C)  Heart Rate:  [69-96] 73  Resp:  [18] 18  BP: (102-144)/(47-63) 124/62    Physical Exam:  General: In no acute distress  HEENT: Oropharynx clear, moist mucous membranes  Cardiovascular: RRR  Respiratory: Normal work of breathing  GI: Soft, NT/ND  Skin: Left foot with significant swelling and erythema.  Extremities: Edema of the left foot but no cyanosis  Access: Peripheral IV.    Antibiotics:  Anti-Infectives (From admission, onward)    Ordered     Dose/Rate Route Frequency Start Stop    03/23/23 0835  vancomycin (VANCOCIN) 1000 mg/200 mL dextrose 5% IVPB        Ordering Provider: Anuradha Desouza APRN    1,000 mg  over 60 Minutes Intravenous Once 03/23/23 1030 03/23/23 1332    03/22/23 2315  Vancomycin Pharmacy Intermittent/Pulse Dosing        Ordering Provider: Anuradha Desouza APRN     Does not apply Daily 03/23/23 0900 03/28/23 0859    03/22/23 2315  piperacillin-tazobactam (ZOSYN) 3.375 g in iso-osmotic dextrose 50 ml (premix)        Ordering Provider: Anuradha Desouza APRN    3.375 g  over 4 Hours Intravenous Every 8 Hours 03/23/23 0030 03/28/23 0029    03/22/23 2308  Pharmacy to dose vancomycin        Ordering Provider: Anuradha Desouza APRN     Does not apply Continuous PRN 03/22/23 2308 03/27/23 2307    03/22/23 1744  piperacillin-tazobactam (ZOSYN) 4.5 g in iso-osmotic dextrose 100 mL IVPB (premix)        Ordering Provider: Richard Felipe II, MD    4.5 g  over 30 Minutes Intravenous Once 03/22/23 1746 03/22/23 1910    03/22/23 1741  vancomycin 2250 mg/500 mL 0.9% NS IVPB ([facility])        Ordering Provider: Richard Felipe II, MD    20 mg/kg × 113 kg  Intravenous Once 03/22/23 1743 03/22/23 2238           Results Review:     I reviewed the patient's new clinical results.    Lab Results   Component Value Date    WBC 15.29 (H) 03/24/2023    HGB 10.0 (L) 03/24/2023    HCT 29.4 (L) 03/24/2023    MCV 86.2 03/24/2023     03/24/2023     Lab Results   Component Value Date    GLUCOSE 193 (H) 03/24/2023    BUN 44 (H) 03/24/2023    CREATININE 2.53 (H) 03/24/2023    EGFRIFNONA 29 (L) 09/10/2021    EGFRIFAFRI 32 (L) 01/18/2022    BCR 17.4 03/24/2023    CO2 23.0 03/24/2023    CALCIUM 8.6 03/24/2023    ALBUMIN 3.0 (L) 03/22/2023    LABIL2 1.1 07/01/2020    AST 20 03/22/2023    ALT 10 03/22/2023       Microbiology:  3/22 blood cultures in process    New imaging:  3/23 MRI left foot with report reviewed showing soft tissue wound with sinus tract extending towards the first webspace.  Diffuse soft tissue edema and fairly extensive soft tissue gas around the lateral half of the great toe medial base of the second toe and extending into the soft tissue of the tissue dorsal to the second metatarsal head.  No identifiable abscesses or osteomyelitis.    Assessment    #Sepsis  #Left diabetic foot infection  #Type 2 diabetes  #MARTIN on CKD  #Renal clear-cell carcinoma     MRI with extensive evidence of soft tissue infection and plans for trip to the OR for debridement this morning.  Plan to continue IV vancomycin goal -600 and Zosyn 3.375 every 8 hours.  Plan to follow-up cultures and OR findings for further de-escalation of antibiotic therapy.      ID will follow.

## 2023-03-25 LAB
ANION GAP SERPL CALCULATED.3IONS-SCNC: 11 MMOL/L (ref 5–15)
BASOPHILS # BLD AUTO: 0.01 10*3/MM3 (ref 0–0.2)
BASOPHILS NFR BLD AUTO: 0.1 % (ref 0–1.5)
BUN SERPL-MCNC: 43 MG/DL (ref 8–23)
BUN/CREAT SERPL: 19.1 (ref 7–25)
CALCIUM SPEC-SCNC: 9 MG/DL (ref 8.6–10.5)
CHLORIDE SERPL-SCNC: 104 MMOL/L (ref 98–107)
CO2 SERPL-SCNC: 22 MMOL/L (ref 22–29)
CREAT SERPL-MCNC: 2.25 MG/DL (ref 0.76–1.27)
DEPRECATED RDW RBC AUTO: 40.6 FL (ref 37–54)
EGFRCR SERPLBLD CKD-EPI 2021: 29.8 ML/MIN/1.73
EOSINOPHIL # BLD AUTO: 0.01 10*3/MM3 (ref 0–0.4)
EOSINOPHIL NFR BLD AUTO: 0.1 % (ref 0.3–6.2)
ERYTHROCYTE [DISTWIDTH] IN BLOOD BY AUTOMATED COUNT: 13.3 % (ref 12.3–15.4)
GLUCOSE BLDC GLUCOMTR-MCNC: 189 MG/DL (ref 70–130)
GLUCOSE BLDC GLUCOMTR-MCNC: 212 MG/DL (ref 70–130)
GLUCOSE BLDC GLUCOMTR-MCNC: 221 MG/DL (ref 70–130)
GLUCOSE BLDC GLUCOMTR-MCNC: 231 MG/DL (ref 70–130)
GLUCOSE SERPL-MCNC: 235 MG/DL (ref 65–99)
HCT VFR BLD AUTO: 31.4 % (ref 37.5–51)
HGB BLD-MCNC: 10.9 G/DL (ref 13–17.7)
IMM GRANULOCYTES # BLD AUTO: 0.07 10*3/MM3 (ref 0–0.05)
IMM GRANULOCYTES NFR BLD AUTO: 0.6 % (ref 0–0.5)
LYMPHOCYTES # BLD AUTO: 0.83 10*3/MM3 (ref 0.7–3.1)
LYMPHOCYTES NFR BLD AUTO: 6.9 % (ref 19.6–45.3)
MCH RBC QN AUTO: 29.4 PG (ref 26.6–33)
MCHC RBC AUTO-ENTMCNC: 34.7 G/DL (ref 31.5–35.7)
MCV RBC AUTO: 84.6 FL (ref 79–97)
MONOCYTES # BLD AUTO: 0.69 10*3/MM3 (ref 0.1–0.9)
MONOCYTES NFR BLD AUTO: 5.7 % (ref 5–12)
NEUTROPHILS NFR BLD AUTO: 10.41 10*3/MM3 (ref 1.7–7)
NEUTROPHILS NFR BLD AUTO: 86.6 % (ref 42.7–76)
NRBC BLD AUTO-RTO: 0 /100 WBC (ref 0–0.2)
PLATELET # BLD AUTO: 193 10*3/MM3 (ref 140–450)
PMV BLD AUTO: 13 FL (ref 6–12)
POTASSIUM SERPL-SCNC: 4.3 MMOL/L (ref 3.5–5.2)
RBC # BLD AUTO: 3.71 10*6/MM3 (ref 4.14–5.8)
SODIUM SERPL-SCNC: 137 MMOL/L (ref 136–145)
VANCOMYCIN TROUGH SERPL-MCNC: 12 MCG/ML (ref 5–20)
WBC NRBC COR # BLD: 12.02 10*3/MM3 (ref 3.4–10.8)

## 2023-03-25 PROCEDURE — 63710000001 INSULIN LISPRO (HUMAN) PER 5 UNITS: Performed by: SURGERY

## 2023-03-25 PROCEDURE — 94664 DEMO&/EVAL PT USE INHALER: CPT

## 2023-03-25 PROCEDURE — 80048 BASIC METABOLIC PNL TOTAL CA: CPT | Performed by: SURGERY

## 2023-03-25 PROCEDURE — 85025 COMPLETE CBC W/AUTO DIFF WBC: CPT | Performed by: SURGERY

## 2023-03-25 PROCEDURE — 80202 ASSAY OF VANCOMYCIN: CPT | Performed by: INTERNAL MEDICINE

## 2023-03-25 PROCEDURE — 25010000002 ENOXAPARIN PER 10 MG: Performed by: SURGERY

## 2023-03-25 PROCEDURE — 94799 UNLISTED PULMONARY SVC/PX: CPT

## 2023-03-25 PROCEDURE — 25010000002 VANCOMYCIN PER 500 MG: Performed by: SURGERY

## 2023-03-25 PROCEDURE — 94761 N-INVAS EAR/PLS OXIMETRY MLT: CPT

## 2023-03-25 PROCEDURE — 25010000002 PIPERACILLIN SOD-TAZOBACTAM PER 1 G: Performed by: SURGERY

## 2023-03-25 PROCEDURE — 82962 GLUCOSE BLOOD TEST: CPT

## 2023-03-25 RX ORDER — SODIUM HYPOCHLORITE 1.25 MG/ML
SOLUTION TOPICAL 2 TIMES DAILY
Status: DISCONTINUED | OUTPATIENT
Start: 2023-03-25 | End: 2023-03-27

## 2023-03-25 RX ADMIN — DULOXETINE HYDROCHLORIDE 60 MG: 60 CAPSULE, DELAYED RELEASE ORAL at 09:17

## 2023-03-25 RX ADMIN — Medication 10 ML: at 21:35

## 2023-03-25 RX ADMIN — IPRATROPIUM BROMIDE AND ALBUTEROL SULFATE 3 ML: .5; 2.5 SOLUTION RESPIRATORY (INHALATION) at 07:51

## 2023-03-25 RX ADMIN — TRAZODONE HYDROCHLORIDE 100 MG: 100 TABLET ORAL at 21:34

## 2023-03-25 RX ADMIN — TIZANIDINE 4 MG: 4 TABLET ORAL at 21:34

## 2023-03-25 RX ADMIN — FENOFIBRATE 145 MG: 145 TABLET, FILM COATED ORAL at 09:17

## 2023-03-25 RX ADMIN — FINASTERIDE 5 MG: 5 TABLET, FILM COATED ORAL at 09:17

## 2023-03-25 RX ADMIN — AMLODIPINE BESYLATE 10 MG: 10 TABLET ORAL at 06:24

## 2023-03-25 RX ADMIN — LOSARTAN POTASSIUM 100 MG: 100 TABLET, FILM COATED ORAL at 09:17

## 2023-03-25 RX ADMIN — TAZOBACTAM SODIUM AND PIPERACILLIN SODIUM 3.38 G: 375; 3 INJECTION, SOLUTION INTRAVENOUS at 09:17

## 2023-03-25 RX ADMIN — FERROUS SULFATE TAB 325 MG (65 MG ELEMENTAL FE) 325 MG: 325 (65 FE) TAB at 09:17

## 2023-03-25 RX ADMIN — ALLOPURINOL 100 MG: 100 TABLET ORAL at 16:58

## 2023-03-25 RX ADMIN — Medication 10 ML: at 09:18

## 2023-03-25 RX ADMIN — NEBIVOLOL 10 MG: 10 TABLET ORAL at 06:24

## 2023-03-25 RX ADMIN — ATORVASTATIN CALCIUM 40 MG: 20 TABLET, FILM COATED ORAL at 21:34

## 2023-03-25 RX ADMIN — INSULIN GLARGINE-YFGN 30 UNITS: 100 INJECTION, SOLUTION SUBCUTANEOUS at 21:35

## 2023-03-25 RX ADMIN — SODIUM HYPOCHLORITE: 1.25 SOLUTION TOPICAL at 12:30

## 2023-03-25 RX ADMIN — IPRATROPIUM BROMIDE AND ALBUTEROL SULFATE 3 ML: .5; 2.5 SOLUTION RESPIRATORY (INHALATION) at 16:00

## 2023-03-25 RX ADMIN — ENOXAPARIN SODIUM 40 MG: 100 INJECTION SUBCUTANEOUS at 09:16

## 2023-03-25 RX ADMIN — TAZOBACTAM SODIUM AND PIPERACILLIN SODIUM 3.38 G: 375; 3 INJECTION, SOLUTION INTRAVENOUS at 01:58

## 2023-03-25 RX ADMIN — IPRATROPIUM BROMIDE AND ALBUTEROL SULFATE 3 ML: .5; 2.5 SOLUTION RESPIRATORY (INHALATION) at 22:16

## 2023-03-25 RX ADMIN — AMITRIPTYLINE HYDROCHLORIDE 25 MG: 25 TABLET, FILM COATED ORAL at 21:34

## 2023-03-25 RX ADMIN — PREGABALIN 200 MG: 100 CAPSULE ORAL at 09:17

## 2023-03-25 RX ADMIN — ACETAMINOPHEN 650 MG: 325 TABLET, FILM COATED ORAL at 21:34

## 2023-03-25 RX ADMIN — PANTOPRAZOLE SODIUM 40 MG: 40 TABLET, DELAYED RELEASE ORAL at 06:24

## 2023-03-25 RX ADMIN — VANCOMYCIN HYDROCHLORIDE 1000 MG: 1 INJECTION, SOLUTION INTRAVENOUS at 00:47

## 2023-03-25 RX ADMIN — TAZOBACTAM SODIUM AND PIPERACILLIN SODIUM 3.38 G: 375; 3 INJECTION, SOLUTION INTRAVENOUS at 16:58

## 2023-03-25 RX ADMIN — SODIUM HYPOCHLORITE 1 ML: 1.25 SOLUTION TOPICAL at 21:36

## 2023-03-25 RX ADMIN — SODIUM CHLORIDE 100 ML/HR: 9 INJECTION, SOLUTION INTRAVENOUS at 19:39

## 2023-03-25 RX ADMIN — INSULIN LISPRO 3 UNITS: 100 INJECTION, SOLUTION INTRAVENOUS; SUBCUTANEOUS at 16:58

## 2023-03-25 RX ADMIN — INSULIN LISPRO 5 UNITS: 100 INJECTION, SOLUTION INTRAVENOUS; SUBCUTANEOUS at 09:17

## 2023-03-25 RX ADMIN — HYDROCHLOROTHIAZIDE 25 MG: 25 TABLET ORAL at 09:17

## 2023-03-25 RX ADMIN — TAMSULOSIN HYDROCHLORIDE 0.4 MG: 0.4 CAPSULE ORAL at 09:17

## 2023-03-25 RX ADMIN — INSULIN LISPRO 5 UNITS: 100 INJECTION, SOLUTION INTRAVENOUS; SUBCUTANEOUS at 12:33

## 2023-03-25 NOTE — PROGRESS NOTES
Name: Tomy Manjarrez ADMIT: 3/22/2023   : 1950  PCP: Zoya Claros PA    MRN: [id number] LOS: 3 days   AGE/SEX: 73 y.o. male  ROOM: [address]    [place] [employer] [facility] [employer]    Chief Complaint   Patient presents with   • Foot Pain   • Numbness     CC: Foot I&D follow-up peripheral arterial disease follow-up  Subjective     73 y.o. male patient resting comfortably.  Denies infection.    Review of Systems    Objective     Scheduled Medications:   allopurinol, 100 mg, Oral, Q PM  amitriptyline, 25 mg, Oral, Nightly  amLODIPine, 10 mg, Oral, QAM  atorvastatin, 40 mg, Oral, Nightly  DULoxetine, 60 mg, Oral, Daily  enoxaparin, 40 mg, Subcutaneous, Daily  fenofibrate, 145 mg, Oral, Daily  ferrous sulfate, 325 mg, Oral, Daily With Breakfast  finasteride, 5 mg, Oral, Daily  losartan, 100 mg, Oral, Q24H   And  hydroCHLOROthiazide, 25 mg, Oral, Q24H  insulin glargine, 20 Units, Subcutaneous, Nightly  insulin lispro, 0-14 Units, Subcutaneous, TID AC  ipratropium-albuterol, 3 mL, Nebulization, Q8H - RT  nebivolol, 10 mg, Oral, QAM  pantoprazole, 40 mg, Oral, Q AM  piperacillin-tazobactam, 3.375 g, Intravenous, Q8H  pregabalin, 200 mg, Oral, Daily  sodium chloride, 10 mL, Intravenous, Q12H  tamsulosin, 0.4 mg, Oral, Daily  terazosin, 2 mg, Oral, Nightly  tiZANidine, 4 mg, Oral, Nightly  traZODone, 100 mg, Oral, Nightly  vancomycin, 1,000 mg, Intravenous, Q24H        Active Infusions:  lactated ringers, 9 mL/hr  Pharmacy to dose vancomycin,   sodium chloride, 100 mL/hr, Last Rate: Stopped (23 1450)        As Needed Medications:  •  acetaminophen  •  dextrose  •  dextrose  •  glucagon (human recombinant)  •  HYDROcodone-acetaminophen  •  HYDROmorphone **AND** naloxone  •  nitroglycerin  •  ondansetron **OR** ondansetron  •  Pharmacy to dose vancomycin  •  sodium chloride  •  sodium chloride    Vital Signs  Vital Signs Patient Vitals for the past 24 hrs:   BP Temp Temp src Pulse Resp SpO2  Weight   03/25/23 0751 -- -- -- 62 16 97 % --   03/25/23 0707 120/63 98 °F (36.7 °C) Oral 61 16 96 % --   03/25/23 0546 -- -- -- -- -- -- 123 kg (270 lb 1 oz)   03/24/23 2325 113/57 98.5 °F (36.9 °C) Oral 70 16 95 % --   03/24/23 2311 -- -- -- 68 18 100 % --   03/24/23 2308 -- -- -- 73 18 93 % --   03/24/23 1937 123/68 99.2 °F (37.3 °C) Oral 75 16 97 % --   03/24/23 1748 -- -- -- -- 16 94 % --   03/24/23 1600 119/55 -- -- 70 16 95 % --   03/24/23 1545 109/91 -- -- 70 16 96 % --   03/24/23 1530 117/62 -- -- 73 16 98 % --   03/24/23 1515 97/76 -- -- 74 17 95 % --   03/24/23 1510 103/73 -- -- 73 16 96 % --   03/24/23 1503 96/52 98.9 °F (37.2 °C) Oral 73 16 94 % --   03/24/23 1230 115/62 -- -- 75 18 97 % --   03/24/23 1227 -- -- -- 74 -- 95 % --   03/24/23 1225 112/60 -- -- 73 20 98 % --   03/24/23 1224 -- -- -- 76 -- 100 % --   03/24/23 1221 -- -- -- 75 -- 99 % --   03/24/23 1218 -- -- -- 77 -- 98 % --   03/24/23 1215 -- -- -- 75 -- 99 % --   03/24/23 1212 -- -- -- 76 -- 99 % --   03/24/23 1209 -- -- -- 74 -- 98 % --   03/24/23 1206 -- -- -- 74 -- 98 % --   03/24/23 1203 -- -- -- 75 -- 98 % --   03/24/23 1200 117/57 -- -- 74 -- 100 % --   03/24/23 1157 -- -- -- 74 -- 100 % --   03/24/23 1155 116/60 -- -- -- -- -- --   03/24/23 1154 -- -- -- 74 -- 98 % --   03/24/23 1151 -- -- -- 77 -- 98 % --   03/24/23 1150 121/60 -- -- -- -- -- --   03/24/23 1148 -- -- -- 75 -- 96 % --   03/24/23 1145 119/53 -- -- 75 22 95 % --   03/24/23 1143 119/56 -- -- -- -- -- --   03/24/23 1142 -- -- -- 76 -- 98 % --   03/24/23 1139 -- -- -- 75 -- 96 % --   03/24/23 1136 -- -- -- 76 -- 96 % --   03/24/23 1133 -- -- -- 76 -- 96 % --   03/24/23 1057 115/54 99.7 °F (37.6 °C) Oral 78 22 94 % --     I/O:  I/O last 3 completed shifts:  In: 740 [P.O.:240; IV Piggyback:500]  Out: 4165 [Urine:4150; Blood:15]    Physical Exam:  Physical Exam     Results Review:     CBC    Results from last 7 days   Lab Units 03/25/23  0523 03/24/23  0527 03/23/23  0453  03/22/23  1830   WBC 10*3/mm3 12.02* 15.29* 15.23* 15.53*   HEMOGLOBIN g/dL 10.9* 10.0* 11.0* 11.5*   PLATELETS 10*3/mm3 193 167 166 176     BMP   Results from last 7 days   Lab Units 03/25/23  0523 03/24/23  0527 03/23/23  0453 03/22/23  2335 03/22/23  1830   SODIUM mmol/L 137 137 134* 134* 131*   POTASSIUM mmol/L 4.3 4.0 4.1 4.2 4.4   CHLORIDE mmol/L 104 104 100 100 96*   CO2 mmol/L 22.0 23.0 25.0 20.8* 23.0   BUN mg/dL 43* 44* 46* 48* 49*   CREATININE mg/dL 2.25* 2.53* 2.72* 2.96* 3.12*   GLUCOSE mg/dL 235* 193* 196* 218* 219*       Assessment & Plan     Assessment & Plan      Cellulitis of left foot    Type 2 diabetes mellitus with hyperglycemia, with long-term current use of insulin (HCC)    Hypertension    MARTIN (acute kidney injury) (HCC)    CKD (chronic kidney disease) stage 3, GFR 30-59 ml/min (HCC)    Essential hypertension    Clear cell carcinoma of kidney (HCC)    Hyponatremia      73 y.o. male left diabetic foot infection with mal perforans ulcer.    3/24/2023 left foot incision and drainage with debridement.    3/25/2023: Postop day number 1 foot debridement.  Patient doing well.  Dressings down.  No foul smell no residual infection identified.  We will start Dakin's wet-to-dry dressing changes.  Patient is tentatively on the schedule for Tuesday to perform diagnostic arteriogram if his foot wounds look ischemic.      Jose Piedra MD  03/25/23  10:34 EDT    Please call my office with any question: [phone]    Active Hospital Problems    Diagnosis  POA   • **Cellulitis of left foot [L03.116]  Yes   • Hyponatremia [E87.1]  Unknown   • Clear cell carcinoma of kidney (HCC) [C64.9]  Yes   • Essential hypertension [I10]  Yes   • Type 2 diabetes mellitus with hyperglycemia, with long-term current use of insulin (HCC) [E11.65, Z79.4]  Not Applicable   • Hypertension [I10]  Yes   • MARTIN (acute kidney injury) (HCC) [N17.9]  Yes   • CKD (chronic kidney disease) stage 3, GFR 30-59 ml/min ([facility]) [N18.30]   Yes      Resolved Hospital Problems   No resolved problems to display.

## 2023-03-25 NOTE — PROGRESS NOTES
"[facility] Clinical Pharmacy Services: Vancomycin Monitoring Note    Tomy Manjarrez is a 73 y.o. male who is on day 4/5 of pharmacy to dose vancomycin for Skin and Soft Tissue.    Previous Vancomycin Dose:   1000 mg IV every  24  hours  Updated Cultures and Sensitivities: 3/24 WoundCx - pending  Results from last 7 days   Lab Units 03/25/23  1725 03/24/23  0527 03/23/23 0453   VANCOMYCIN RM mcg/mL  --  15.60 19.30   VANCOMYCIN TR mcg/mL 12.00  --   --      Vitals/Labs  Ht: 190.5 cm (75\"); Wt: 123 kg (270 lb 1 oz)   Temp Readings from Last 1 Encounters:   03/25/23 98.6 °F (37 °C) (Oral)     Estimated Creatinine Clearance: 40.7 mL/min (A) (by C-G formula based on SCr of 2.25 mg/dL (H)).        Results from last 7 days   Lab Units 03/25/23  0523 03/24/23 0527 03/23/23 0453   CREATININE mg/dL 2.25* 2.53* 2.72*   WBC 10*3/mm3 12.02* 15.29* 15.23*     Assessment/Plan    Current Vancomycin Dose: 1250 mg IV every  24  hours; provides a predicted  mg/L.hr   Next Level Date and Time: None currently ordered unless DOT extended or CrCl significantly changes  We will continue to monitor patient changes and renal function     Thank you for involving pharmacy in this patient's care. Please contact pharmacy with any questions or concerns.       Taryn Juan, [facility]  Clinical Pharmacist          "

## 2023-03-25 NOTE — PROGRESS NOTES
Name: Tomy Manjarrez ADMIT: 3/22/2023   : 1950  PCP: Zoya Claros PA    MRN: [id number] LOS: 3 days   AGE/SEX: 73 y.o. male  ROOM: [facility]     Subjective   Subjective     Patient is lying in the bed and does not appear to be any major distress.  Appears weak and lethargic.  Denies nausea, vomiting, abdominal pain, chest pain.       Objective   Objective   Vital Signs  Temp:  [98 °F (36.7 °C)-99.2 °F (37.3 °C)] 98.6 °F (37 °C)  Heart Rate:  [61-75] 64  Resp:  [16-18] 16  BP: ()/(52-91) 114/62  SpO2:  [93 %-100 %] 94 %  on  Flow (L/min):  [2-4] 2;   Device (Oxygen Therapy): room air  Body mass index is 33.76 kg/m².  Physical Exam  Constitutional:       General: He is not in acute distress.     Appearance: He is not ill-appearing.   HENT:      Head: Normocephalic and atraumatic.      Mouth/Throat:      Mouth: Mucous membranes are moist.   Eyes:      Pupils: Pupils are equal, round, and reactive to light.   Cardiovascular:      Rate and Rhythm: Normal rate and regular rhythm.      Pulses: Normal pulses.      Heart sounds: Normal heart sounds.   Pulmonary:      Effort: Pulmonary effort is normal.      Breath sounds: Normal breath sounds.   Abdominal:      General: Bowel sounds are normal. There is no distension.      Palpations: Abdomen is soft.      Tenderness: There is no abdominal tenderness.   Musculoskeletal:      Cervical back: Normal range of motion and neck supple.      Comments: Left foot is dressed.   Skin:     General: Skin is warm and dry.   Neurological:      General: No focal deficit present.      Mental Status: He is alert and oriented to person, place, and time.       Results Review     I reviewed the patient's new clinical results.  Results from last 7 days   Lab Units 23  0523 23  0527 23  0453 23  1830   WBC 10*3/mm3 12.02* 15.29* 15.23* 15.53*   HEMOGLOBIN g/dL 10.9* 10.0* 11.0* 11.5*   PLATELETS 10*3/mm3 193 167 166 176     Results from last 7  days   Lab Units 03/25/23  0523 03/24/23  0527 03/23/23  0453 03/22/23  2335   SODIUM mmol/L 137 137 134* 134*   POTASSIUM mmol/L 4.3 4.0 4.1 4.2   CHLORIDE mmol/L 104 104 100 100   CO2 mmol/L 22.0 23.0 25.0 20.8*   BUN mg/dL 43* 44* 46* 48*   CREATININE mg/dL 2.25* 2.53* 2.72* 2.96*   GLUCOSE mg/dL 235* 193* 196* 218*   EGFR mL/min/1.73 29.8* 25.9* 23.8* 21.5*     Results from last 7 days   Lab Units 03/22/23  2335   ALBUMIN g/dL 3.0*   BILIRUBIN mg/dL 0.7   ALK PHOS U/L 209*   AST (SGOT) U/L 20   ALT (SGPT) U/L 10     Results from last 7 days   Lab Units 03/25/23  0523 03/24/23  0527 03/23/23  0453 03/22/23  2335   CALCIUM mg/dL 9.0 8.6 8.3* 8.9   ALBUMIN g/dL  --   --   --  3.0*     Results from last 7 days   Lab Units 03/22/23  2335 03/22/23  1830   PROCALCITONIN ng/mL 1.89*  --    LACTATE mmol/L  --  1.2     Glucose   Date/Time Value Ref Range Status   03/25/2023 1122 212 (H) 70 - 130 mg/dL Final     Comment:     Meter: [id number] : [id number] Antoni Thomasny NA   03/25/2023 0625 221 (H) 70 - 130 mg/dL Final     Comment:     Meter: [id number] : [id number] Juan Alexia NA   03/24/2023 2059 210 (H) 70 - 130 mg/dL Final     Comment:     Meter: [id number] : [id number] Juan Alexia NA   03/24/2023 1646 209 (H) 70 - 130 mg/dL Final     Comment:     Meter: [id number] : [id number] Memo Grey NA   03/24/2023 1510 180 (H) 70 - 130 mg/dL Final     Comment:     Meter: [id number] : [id number] Karla Galarza RN   03/24/2023 1105 159 (H) 70 - 130 mg/dL Final     Comment:     Meter: [id number] : [id number] Loki Kenia RN   03/24/2023 0540 190 (H) 70 - 130 mg/dL Final     Comment:     Meter: [id number] : [id number] Lillian HILLS       No radiology results for the last day  I have personally reviewed all medications:  Scheduled Medications  allopurinol, 100 mg, Oral, Q PM  amitriptyline, 25 mg, Oral, Nightly  amLODIPine, 10 mg, Oral, QAM  atorvastatin, 40 mg, Oral,  Nightly  DULoxetine, 60 mg, Oral, Daily  enoxaparin, 40 mg, Subcutaneous, Daily  fenofibrate, 145 mg, Oral, Daily  ferrous sulfate, 325 mg, Oral, Daily With Breakfast  finasteride, 5 mg, Oral, Daily  losartan, 100 mg, Oral, Q24H   And  hydroCHLOROthiazide, 25 mg, Oral, Q24H  insulin glargine, 20 Units, Subcutaneous, Nightly  insulin lispro, 0-14 Units, Subcutaneous, TID AC  ipratropium-albuterol, 3 mL, Nebulization, Q8H - RT  nebivolol, 10 mg, Oral, QAM  pantoprazole, 40 mg, Oral, Q AM  piperacillin-tazobactam, 3.375 g, Intravenous, Q8H  pregabalin, 200 mg, Oral, Daily  sodium chloride, 10 mL, Intravenous, Q12H  sodium hypochlorite, , Topical, BID  tamsulosin, 0.4 mg, Oral, Daily  terazosin, 2 mg, Oral, Nightly  tiZANidine, 4 mg, Oral, Nightly  traZODone, 100 mg, Oral, Nightly  vancomycin, 1,000 mg, Intravenous, Q24H    Infusions  lactated ringers, 9 mL/hr  Pharmacy to dose vancomycin,   sodium chloride, 100 mL/hr, Last Rate: Stopped (03/24/23 1450)    Diet  Diet: Cardiac Diets, Diabetic Diets; Healthy Heart (2-3 Na+); Consistent Carbohydrate; Texture: Regular Texture (IDDSI 7); Fluid Consistency: Thin (IDDSI 0)    I have personally reviewed:  [x]  Laboratory   [x]  Microbiology   [x]  Radiology   [x]  EKG/Telemetry  [x]  Cardiology/Vascular   [x]  Pathology    [x]  Records       Assessment/Plan     Active Hospital Problems    Diagnosis  POA   • **Cellulitis of left foot [L03.116]  Yes   • Hyponatremia [E87.1]  Unknown   • Clear cell carcinoma of kidney (HCC) [C64.9]  Yes   • Essential hypertension [I10]  Yes   • Type 2 diabetes mellitus with hyperglycemia, with long-term current use of insulin (HCC) [E11.65, Z79.4]  Not Applicable   • Hypertension [I10]  Yes   • MARTIN (acute kidney injury) ([facility]) [N17.9]  Yes   • CKD (chronic kidney disease) stage 3, GFR 30-59 ml/min ([facility]) [N18.30]  Yes      Resolved Hospital Problems   No resolved problems to display.       1. Sepsis/left diabetic foot infection, currently patient is  on vancomycin and Zosyn and will be continued.  MRI revealed extensive soft tissue involvement and therefore was taken to the operating room on 3/24/2023 and underwent debridement.  Appreciate infectious disease and vascular input.    2.  Diabetes mellitus, will further advance Lantus to 30 units and will continue with corrective dose insulin.    3.  Hypertension, on amlodipine, HCTZ, Cozaar, Bystolic, terazosin which be continued.    4.  Neuropathy, on Lyrica.    5.  BPH, on finasteride and Flomax.    6.  Anemia of chronic disease, no evidence of an active bleeding.  Monitor H&H closely.    7.  CKD stage III, creatinine is at baseline.    8.  Clear-cell carcinoma of the kidney, advised him to follow-up with primary oncology as an outpatient basis    Copied text on this note has been reviewed by me on 3/25/2023      Dani Young MD  [facility]  03/25/23  14:15 EDT

## 2023-03-25 NOTE — PLAN OF CARE
Goal Outcome Evaluation:  Plan of Care Reviewed With: patient        Progress: no change  Outcome Evaluation: S/p left foot I & D. Denies pain, wrapped with surgical dressing. Rested intermittently, awoke with confusion but reorients easily. Afebrile, other VSS.

## 2023-03-25 NOTE — PLAN OF CARE
Problem: Fall Injury Risk  Goal: Absence of Fall and Fall-Related Injury  Intervention: Identify and Manage Contributors  Recent Flowsheet Documentation  Taken 3/25/2023 1646 by Poly Vazquez RN  Medication Review/Management: medications reviewed  Taken 3/25/2023 1430 by Poly Vazquez RN  Medication Review/Management: medications reviewed  Taken 3/25/2023 1233 by Poly Vazquez RN  Medication Review/Management: medications reviewed  Taken 3/25/2023 1045 by Poly Vazquez RN  Medication Review/Management: medications reviewed  Taken 3/25/2023 0916 by Poly Vazquez RN  Medication Review/Management: medications reviewed  Intervention: Promote Injury-Free Environment  Recent Flowsheet Documentation  Taken 3/25/2023 1646 by Poly Vazquez RN  Safety Promotion/Fall Prevention:   safety round/check completed   room organization consistent   nonskid shoes/slippers when out of bed   lighting adjusted   fall prevention program maintained   clutter free environment maintained   assistive device/personal items within reach  Taken 3/25/2023 1430 by Poly Vazquez RN  Safety Promotion/Fall Prevention:   safety round/check completed   room organization consistent   nonskid shoes/slippers when out of bed   lighting adjusted   fall prevention program maintained   clutter free environment maintained   assistive device/personal items within reach  Taken 3/25/2023 1233 by Poly Vazquez RN  Safety Promotion/Fall Prevention:   safety round/check completed   room organization consistent   nonskid shoes/slippers when out of bed   lighting adjusted   fall prevention program maintained   clutter free environment maintained   assistive device/personal items within reach  Taken 3/25/2023 1045 by Poly Vazquez RN  Safety Promotion/Fall Prevention:   safety round/check completed   room organization consistent   nonskid shoes/slippers when out of bed   lighting adjusted   fall prevention program  maintained   clutter free environment maintained   assistive device/personal items within reach  Taken 3/25/2023 0916 by Poly Vazquez RN  Safety Promotion/Fall Prevention:   safety round/check completed   room organization consistent   nonskid shoes/slippers when out of bed   lighting adjusted   fall prevention program maintained   clutter free environment maintained   assistive device/personal items within reach   Goal Outcome Evaluation:  Plan of Care Reviewed With: patient        Progress: improving  Outcome Evaluation: vitals stable, left foot I&D dressing changed dakins w-d, patient tolerated denies pain, up to chair with max assist with walker and gait belt, could benefit from PT, surgical shoe obtained for left foot, abx per order, lee bsd r/t urinary retention, pt to follow up with urology as outpt.

## 2023-03-26 LAB
ANION GAP SERPL CALCULATED.3IONS-SCNC: 8 MMOL/L (ref 5–15)
BASOPHILS # BLD AUTO: 0.03 10*3/MM3 (ref 0–0.2)
BASOPHILS NFR BLD AUTO: 0.3 % (ref 0–1.5)
BUN SERPL-MCNC: 47 MG/DL (ref 8–23)
BUN/CREAT SERPL: 20.4 (ref 7–25)
CALCIUM SPEC-SCNC: 9.2 MG/DL (ref 8.6–10.5)
CHLORIDE SERPL-SCNC: 105 MMOL/L (ref 98–107)
CO2 SERPL-SCNC: 22 MMOL/L (ref 22–29)
CREAT SERPL-MCNC: 2.3 MG/DL (ref 0.76–1.27)
DEPRECATED RDW RBC AUTO: 43.4 FL (ref 37–54)
EGFRCR SERPLBLD CKD-EPI 2021: 29.1 ML/MIN/1.73
EOSINOPHIL # BLD AUTO: 0.36 10*3/MM3 (ref 0–0.4)
EOSINOPHIL NFR BLD AUTO: 3.5 % (ref 0.3–6.2)
ERYTHROCYTE [DISTWIDTH] IN BLOOD BY AUTOMATED COUNT: 13.9 % (ref 12.3–15.4)
GLUCOSE BLDC GLUCOMTR-MCNC: 145 MG/DL (ref 70–130)
GLUCOSE BLDC GLUCOMTR-MCNC: 159 MG/DL (ref 70–130)
GLUCOSE BLDC GLUCOMTR-MCNC: 164 MG/DL (ref 70–130)
GLUCOSE BLDC GLUCOMTR-MCNC: 188 MG/DL (ref 70–130)
GLUCOSE SERPL-MCNC: 176 MG/DL (ref 65–99)
HCT VFR BLD AUTO: 32.3 % (ref 37.5–51)
HGB BLD-MCNC: 10.6 G/DL (ref 13–17.7)
IMM GRANULOCYTES # BLD AUTO: 0.05 10*3/MM3 (ref 0–0.05)
IMM GRANULOCYTES NFR BLD AUTO: 0.5 % (ref 0–0.5)
LYMPHOCYTES # BLD AUTO: 1.93 10*3/MM3 (ref 0.7–3.1)
LYMPHOCYTES NFR BLD AUTO: 19 % (ref 19.6–45.3)
MCH RBC QN AUTO: 28.1 PG (ref 26.6–33)
MCHC RBC AUTO-ENTMCNC: 32.8 G/DL (ref 31.5–35.7)
MCV RBC AUTO: 85.7 FL (ref 79–97)
MONOCYTES # BLD AUTO: 0.88 10*3/MM3 (ref 0.1–0.9)
MONOCYTES NFR BLD AUTO: 8.7 % (ref 5–12)
NEUTROPHILS NFR BLD AUTO: 6.9 10*3/MM3 (ref 1.7–7)
NEUTROPHILS NFR BLD AUTO: 68 % (ref 42.7–76)
NRBC BLD AUTO-RTO: 0 /100 WBC (ref 0–0.2)
PLATELET # BLD AUTO: 214 10*3/MM3 (ref 140–450)
PMV BLD AUTO: 12.6 FL (ref 6–12)
POTASSIUM SERPL-SCNC: 4.1 MMOL/L (ref 3.5–5.2)
RBC # BLD AUTO: 3.77 10*6/MM3 (ref 4.14–5.8)
SODIUM SERPL-SCNC: 135 MMOL/L (ref 136–145)
WBC NRBC COR # BLD: 10.15 10*3/MM3 (ref 3.4–10.8)

## 2023-03-26 PROCEDURE — 25010000002 VANCOMYCIN 10 G RECONSTITUTED SOLUTION: Performed by: INTERNAL MEDICINE

## 2023-03-26 PROCEDURE — 94799 UNLISTED PULMONARY SVC/PX: CPT

## 2023-03-26 PROCEDURE — 94664 DEMO&/EVAL PT USE INHALER: CPT

## 2023-03-26 PROCEDURE — 82962 GLUCOSE BLOOD TEST: CPT

## 2023-03-26 PROCEDURE — 63710000001 INSULIN LISPRO (HUMAN) PER 5 UNITS: Performed by: SURGERY

## 2023-03-26 PROCEDURE — 80048 BASIC METABOLIC PNL TOTAL CA: CPT | Performed by: SURGERY

## 2023-03-26 PROCEDURE — 94761 N-INVAS EAR/PLS OXIMETRY MLT: CPT

## 2023-03-26 PROCEDURE — 25010000002 ENOXAPARIN PER 10 MG: Performed by: SURGERY

## 2023-03-26 PROCEDURE — 94760 N-INVAS EAR/PLS OXIMETRY 1: CPT

## 2023-03-26 PROCEDURE — 25010000002 PIPERACILLIN SOD-TAZOBACTAM PER 1 G: Performed by: SURGERY

## 2023-03-26 PROCEDURE — 85025 COMPLETE CBC W/AUTO DIFF WBC: CPT | Performed by: SURGERY

## 2023-03-26 RX ADMIN — SODIUM HYPOCHLORITE 1 ML: 1.25 SOLUTION TOPICAL at 23:04

## 2023-03-26 RX ADMIN — FINASTERIDE 5 MG: 5 TABLET, FILM COATED ORAL at 09:29

## 2023-03-26 RX ADMIN — INSULIN GLARGINE-YFGN 30 UNITS: 100 INJECTION, SOLUTION SUBCUTANEOUS at 21:01

## 2023-03-26 RX ADMIN — VANCOMYCIN HYDROCHLORIDE 1250 MG: 10 INJECTION, POWDER, LYOPHILIZED, FOR SOLUTION INTRAVENOUS at 02:14

## 2023-03-26 RX ADMIN — FERROUS SULFATE TAB 325 MG (65 MG ELEMENTAL FE) 325 MG: 325 (65 FE) TAB at 09:29

## 2023-03-26 RX ADMIN — ATORVASTATIN CALCIUM 40 MG: 20 TABLET, FILM COATED ORAL at 21:01

## 2023-03-26 RX ADMIN — ENOXAPARIN SODIUM 40 MG: 100 INJECTION SUBCUTANEOUS at 09:29

## 2023-03-26 RX ADMIN — HYDROCODONE BITARTRATE AND ACETAMINOPHEN 1 TABLET: 7.5; 325 TABLET ORAL at 02:19

## 2023-03-26 RX ADMIN — PANTOPRAZOLE SODIUM 40 MG: 40 TABLET, DELAYED RELEASE ORAL at 05:48

## 2023-03-26 RX ADMIN — AMITRIPTYLINE HYDROCHLORIDE 25 MG: 25 TABLET, FILM COATED ORAL at 21:01

## 2023-03-26 RX ADMIN — SODIUM CHLORIDE 100 ML/HR: 9 INJECTION, SOLUTION INTRAVENOUS at 09:28

## 2023-03-26 RX ADMIN — HYDROCODONE BITARTRATE AND ACETAMINOPHEN 1 TABLET: 7.5; 325 TABLET ORAL at 15:30

## 2023-03-26 RX ADMIN — INSULIN LISPRO 2 UNITS: 100 INJECTION, SOLUTION INTRAVENOUS; SUBCUTANEOUS at 17:47

## 2023-03-26 RX ADMIN — IPRATROPIUM BROMIDE AND ALBUTEROL SULFATE 3 ML: .5; 2.5 SOLUTION RESPIRATORY (INHALATION) at 07:02

## 2023-03-26 RX ADMIN — INSULIN LISPRO 3 UNITS: 100 INJECTION, SOLUTION INTRAVENOUS; SUBCUTANEOUS at 09:29

## 2023-03-26 RX ADMIN — TAZOBACTAM SODIUM AND PIPERACILLIN SODIUM 3.38 G: 375; 3 INJECTION, SOLUTION INTRAVENOUS at 09:28

## 2023-03-26 RX ADMIN — TAZOBACTAM SODIUM AND PIPERACILLIN SODIUM 3.38 G: 375; 3 INJECTION, SOLUTION INTRAVENOUS at 02:14

## 2023-03-26 RX ADMIN — TIZANIDINE 4 MG: 4 TABLET ORAL at 21:01

## 2023-03-26 RX ADMIN — TAZOBACTAM SODIUM AND PIPERACILLIN SODIUM 3.38 G: 375; 3 INJECTION, SOLUTION INTRAVENOUS at 17:47

## 2023-03-26 RX ADMIN — Medication 10 ML: at 09:30

## 2023-03-26 RX ADMIN — PREGABALIN 200 MG: 100 CAPSULE ORAL at 09:29

## 2023-03-26 RX ADMIN — HYDROCODONE BITARTRATE AND ACETAMINOPHEN 1 TABLET: 7.5; 325 TABLET ORAL at 21:05

## 2023-03-26 RX ADMIN — Medication 10 ML: at 21:01

## 2023-03-26 RX ADMIN — HYDROCHLOROTHIAZIDE 25 MG: 25 TABLET ORAL at 09:29

## 2023-03-26 RX ADMIN — TAMSULOSIN HYDROCHLORIDE 0.4 MG: 0.4 CAPSULE ORAL at 09:29

## 2023-03-26 RX ADMIN — AMLODIPINE BESYLATE 10 MG: 10 TABLET ORAL at 05:47

## 2023-03-26 RX ADMIN — FENOFIBRATE 145 MG: 145 TABLET, FILM COATED ORAL at 09:30

## 2023-03-26 RX ADMIN — NEBIVOLOL 10 MG: 10 TABLET ORAL at 05:48

## 2023-03-26 RX ADMIN — DULOXETINE HYDROCHLORIDE 60 MG: 60 CAPSULE, DELAYED RELEASE ORAL at 09:29

## 2023-03-26 RX ADMIN — ALLOPURINOL 100 MG: 100 TABLET ORAL at 17:47

## 2023-03-26 RX ADMIN — SODIUM HYPOCHLORITE: 1.25 SOLUTION TOPICAL at 11:00

## 2023-03-26 RX ADMIN — HYDROCODONE BITARTRATE AND ACETAMINOPHEN 1 TABLET: 7.5; 325 TABLET ORAL at 10:21

## 2023-03-26 RX ADMIN — TRAZODONE HYDROCHLORIDE 100 MG: 100 TABLET ORAL at 23:03

## 2023-03-26 RX ADMIN — LOSARTAN POTASSIUM 100 MG: 100 TABLET, FILM COATED ORAL at 09:29

## 2023-03-26 NOTE — PLAN OF CARE
Problem: Fall Injury Risk  Goal: Absence of Fall and Fall-Related Injury  Intervention: Identify and Manage Contributors  Recent Flowsheet Documentation  Taken 3/26/2023 1450 by Poly Vazquez RN  Medication Review/Management: medications reviewed  Taken 3/26/2023 1230 by Poly Vazquez RN  Medication Review/Management: medications reviewed  Taken 3/26/2023 1021 by Poly Vazquez RN  Medication Review/Management: medications reviewed  Taken 3/26/2023 0929 by Poly Vazquez RN  Medication Review/Management: medications reviewed  Intervention: Promote Injury-Free Environment  Recent Flowsheet Documentation  Taken 3/26/2023 1450 by Poly Vazquez RN  Safety Promotion/Fall Prevention:   room organization consistent   safety round/check completed   nonskid shoes/slippers when out of bed   lighting adjusted   fall prevention program maintained   clutter free environment maintained   assistive device/personal items within reach  Taken 3/26/2023 1230 by Poly Vazquez RN  Safety Promotion/Fall Prevention:   safety round/check completed   room organization consistent   nonskid shoes/slippers when out of bed   lighting adjusted   fall prevention program maintained   clutter free environment maintained   assistive device/personal items within reach  Taken 3/26/2023 1021 by Poly Vazquez RN  Safety Promotion/Fall Prevention:   safety round/check completed   room organization consistent   nonskid shoes/slippers when out of bed   fall prevention program maintained   clutter free environment maintained   assistive device/personal items within reach  Taken 3/26/2023 0929 by Poly Vazquez RN  Safety Promotion/Fall Prevention:   safety round/check completed   room organization consistent   nonskid shoes/slippers when out of bed   lighting adjusted   fall prevention program maintained   clutter free environment maintained   assistive device/personal items within reach   Goal Outcome  Evaluation:  Plan of Care Reviewed With: patient        Progress: declining  Outcome Evaluation: vitals stable, pain controlled, encourage pt to turn off pressure points, float heels on pillows, left foot dressing changed, wound bed pink/red but surrounding tissue medial aspect of plantar wound pale white/gray with rolled edges, accumax pump intact, heel touch stand pivot to bsc, could benefit from PT when appropriate,

## 2023-03-26 NOTE — PLAN OF CARE
Goal Outcome Evaluation:  Plan of Care Reviewed With: patient        Progress: improving  Outcome Evaluation: VSS, room air, NSR on monitor, Left foot wound dressing changed dakins W-D. Up to BR w/x2 assist, gait belt, walker - had bowel movement. IV Abx and IVF per order. Sandoval catheter draining well. Daughter at bedside supportive of patient. Awaiting possible vascular procedure 3/28. Will continue to monitor.

## 2023-03-26 NOTE — PROGRESS NOTES
FootName: Tomy Manjarrez ADMIT: 3/22/2023   : 1950  PCP: Zoya Claros PA    MRN: [id number] LOS: 4 days   AGE/SEX: 73 y.o. male  ROOM: [address]    [place], [facility] [employer]    Chief Complaint   Patient presents with   • Foot Pain   • Numbness     CC: Foot I&D follow-up peripheral arterial disease follow-up  Subjective     73 y.o. male patient resting comfortably.  Denies infection.    Review of Systems    Objective     Scheduled Medications:   allopurinol, 100 mg, Oral, Q PM  amitriptyline, 25 mg, Oral, Nightly  amLODIPine, 10 mg, Oral, QAM  atorvastatin, 40 mg, Oral, Nightly  DULoxetine, 60 mg, Oral, Daily  enoxaparin, 40 mg, Subcutaneous, Daily  fenofibrate, 145 mg, Oral, Daily  ferrous sulfate, 325 mg, Oral, Daily With Breakfast  finasteride, 5 mg, Oral, Daily  losartan, 100 mg, Oral, Q24H   And  hydroCHLOROthiazide, 25 mg, Oral, Q24H  insulin glargine, 30 Units, Subcutaneous, Nightly  insulin lispro, 0-14 Units, Subcutaneous, TID AC  nebivolol, 10 mg, Oral, QAM  pantoprazole, 40 mg, Oral, Q AM  piperacillin-tazobactam, 3.375 g, Intravenous, Q8H  pregabalin, 200 mg, Oral, Daily  sodium chloride, 10 mL, Intravenous, Q12H  sodium hypochlorite, , Topical, BID  tamsulosin, 0.4 mg, Oral, Daily  terazosin, 2 mg, Oral, Nightly  tiZANidine, 4 mg, Oral, Nightly  traZODone, 100 mg, Oral, Nightly  vancomycin, 1,250 mg, Intravenous, Q24H        Active Infusions:  lactated ringers, 9 mL/hr  Pharmacy to dose vancomycin,   sodium chloride, 100 mL/hr, Last Rate: 100 mL/hr (23)        As Needed Medications:  •  acetaminophen  •  dextrose  •  dextrose  •  glucagon (human recombinant)  •  HYDROcodone-acetaminophen  •  HYDROmorphone **AND** naloxone  •  nitroglycerin  •  ondansetron **OR** ondansetron  •  Pharmacy to dose vancomycin  •  sodium chloride  •  sodium chloride    Vital Signs  Vital Signs   Patient Vitals for the past 24 hrs:   BP Temp Temp src Pulse Resp SpO2 Weight    03/26/23 0705 138/71 98.2 °F (36.8 °C) Oral 65 -- -- --   03/26/23 0703 -- -- -- -- 14 95 % --   03/26/23 0609 -- -- -- -- -- -- 123 kg (272 lb 0.8 oz)   03/26/23 0547 123/62 -- -- 64 -- -- --   03/25/23 2306 135/79 -- Oral 69 16 96 % --   03/25/23 2219 -- -- -- 62 16 100 % --   03/25/23 2216 -- -- -- 63 16 95 % --   03/25/23 1952 123/73 97.4 °F (36.3 °C) Oral 66 16 96 % --   03/25/23 1700 -- -- -- 67 -- 96 % --   03/25/23 1600 -- -- -- 65 16 95 % --   03/25/23 1500 128/61 98.6 °F (37 °C) Oral 66 16 94 % --   03/25/23 1100 114/62 98.6 °F (37 °C) Oral 64 16 94 % --     I/O:  I/O last 3 completed shifts:  In: 3211 [P.O.:720; I.V.:1691; IV Piggyback:800]  Out: 5425 [Urine:5425]    Physical Exam:  Physical Exam     Results Review:     CBC    Results from last 7 days   Lab Units 03/26/23  0455 03/25/23 0523 03/24/23  0527 03/23/23  0453 03/22/23  1830   WBC 10*3/mm3 10.15 12.02* 15.29* 15.23* 15.53*   HEMOGLOBIN g/dL 10.6* 10.9* 10.0* 11.0* 11.5*   PLATELETS 10*3/mm3 214 193 167 166 176     BMP   Results from last 7 days   Lab Units 03/26/23  0455 03/25/23  0523 03/24/23  0527 03/23/23  0453 03/22/23  2335 03/22/23  1830   SODIUM mmol/L 135* 137 137 134* 134* 131*   POTASSIUM mmol/L 4.1 4.3 4.0 4.1 4.2 4.4   CHLORIDE mmol/L 105 104 104 100 100 96*   CO2 mmol/L 22.0 22.0 23.0 25.0 20.8* 23.0   BUN mg/dL 47* 43* 44* 46* 48* 49*   CREATININE mg/dL 2.30* 2.25* 2.53* 2.72* 2.96* 3.12*   GLUCOSE mg/dL 176* 235* 193* 196* 218* 219*       Assessment & Plan     Assessment & Plan      Cellulitis of left foot    Type 2 diabetes mellitus with hyperglycemia, with long-term current use of insulin (HCC)    Hypertension    MARTIN (acute kidney injury) (HCC)    CKD (chronic kidney disease) stage 3, GFR 30-59 ml/min (HCC)    Essential hypertension    Clear cell carcinoma of kidney (HCC)    Hyponatremia        73 y.o. male left diabetic foot infection with mal perforans ulcer.    3/24/2023 left foot incision and drainage with  debridement.    3/25/2023: Postop day number 1 foot debridement.  Patient doing well.  Dressings down.  No foul smell no residual infection identified.  We will start Dakin's wet-to-dry dressing changes.  Patient is tentatively on the schedule for Tuesday to perform diagnostic arteriogram if his foot wounds look ischemic.    3/26/2023: Postop day #2 debridement.  Doing well.  Tolerating Dakin's dressing changes.  Dr. Baltazar Oh to evaluate wound tomorrow to see if he ruled require angiogram with endovascular revascularization    Jose Piedra MD  03/26/23  10:49 EDT    Please call my office with any question: [phone]    Active Hospital Problems    Diagnosis  POA   • **Cellulitis of left foot [L03.116]  Yes   • Hyponatremia [E87.1]  Unknown   • Clear cell carcinoma of kidney (HCC) [C64.9]  Yes   • Essential hypertension [I10]  Yes   • Type 2 diabetes mellitus with hyperglycemia, with long-term current use of insulin (HCC) [E11.65, Z79.4]  Not Applicable   • Hypertension [I10]  Yes   • MARTIN (acute kidney injury) ([facility]) [N17.9]  Yes   • CKD (chronic kidney disease) stage 3, GFR 30-59 ml/min ([facility]) [N18.30]  Yes      Resolved Hospital Problems   No resolved problems to display.

## 2023-03-26 NOTE — PROGRESS NOTES
Name: Tomy Manjarrez ADMIT: 3/22/2023   : 1950  PCP: Zoya Claros PA    MRN: [id number] LOS: 4 days   AGE/SEX: 73 y.o. male  ROOM: [facility]     Subjective   Subjective     Patient is lying in the bed and does not appear to be any major distress.   Denies nausea, vomiting, abdominal pain, chest pain.       Objective   Objective   Vital Signs  Temp:  [97.4 °F (36.3 °C)-98.2 °F (36.8 °C)] 97.8 °F (36.6 °C)  Heart Rate:  [62-69] 63  Resp:  [14-16] 16  BP: (123-139)/(62-79) 139/71  SpO2:  [95 %-100 %] 95 %  on   ;   Device (Oxygen Therapy): room air  Body mass index is 34 kg/m².  Physical Exam  Constitutional:       General: He is not in acute distress.     Appearance: He is not ill-appearing.   HENT:      Head: Normocephalic and atraumatic.      Mouth/Throat:      Mouth: Mucous membranes are moist.   Eyes:      Pupils: Pupils are equal, round, and reactive to light.   Cardiovascular:      Rate and Rhythm: Normal rate and regular rhythm.      Pulses: Normal pulses.      Heart sounds: Normal heart sounds.   Pulmonary:      Effort: Pulmonary effort is normal.      Breath sounds: Normal breath sounds.   Abdominal:      General: Bowel sounds are normal. There is no distension.      Palpations: Abdomen is soft.      Tenderness: There is no abdominal tenderness.   Musculoskeletal:      Cervical back: Normal range of motion and neck supple.      Comments: Left foot is dressed.   Skin:     General: Skin is warm and dry.   Neurological:      General: No focal deficit present.      Mental Status: He is alert and oriented to person, place, and time.       Results Review     I reviewed the patient's new clinical results.  Results from last 7 days   Lab Units 23  0523   WBC 10*3/mm3 10.15 12.02* 15.29* 15.23*   HEMOGLOBIN g/dL 10.6* 10.9* 10.0* 11.0*   PLATELETS 10*3/mm3 214 193 167 166     Results from last 7 days   Lab Units 23/23  0523  03/24/23  0527 03/23/23  0453   SODIUM mmol/L 135* 137 137 134*   POTASSIUM mmol/L 4.1 4.3 4.0 4.1   CHLORIDE mmol/L 105 104 104 100   CO2 mmol/L 22.0 22.0 23.0 25.0   BUN mg/dL 47* 43* 44* 46*   CREATININE mg/dL 2.30* 2.25* 2.53* 2.72*   GLUCOSE mg/dL 176* 235* 193* 196*   EGFR mL/min/1.73 29.1* 29.8* 25.9* 23.8*     Results from last 7 days   Lab Units 03/22/23  2335   ALBUMIN g/dL 3.0*   BILIRUBIN mg/dL 0.7   ALK PHOS U/L 209*   AST (SGOT) U/L 20   ALT (SGPT) U/L 10     Results from last 7 days   Lab Units 03/26/23  0455 03/25/23  0523 03/24/23  0527 03/23/23  0453 03/22/23  2335   CALCIUM mg/dL 9.2 9.0 8.6 8.3* 8.9   ALBUMIN g/dL  --   --   --   --  3.0*     Results from last 7 days   Lab Units 03/22/23  2335 03/22/23  1830   PROCALCITONIN ng/mL 1.89*  --    LACTATE mmol/L  --  1.2     Glucose   Date/Time Value Ref Range Status   03/26/2023 1203 145 (H) 70 - 130 mg/dL Final     Comment:     Meter: [id number] : [id number] Rosanna Tse NA   03/26/2023 0603 159 (H) 70 - 130 mg/dL Final     Comment:     Meter: [id number] : [id number] Dean Holley NA   03/25/2023 2058 231 (H) 70 - 130 mg/dL Final     Comment:     Meter: [id number] : [id number] Dean Cardenasa NA   03/25/2023 1622 189 (H) 70 - 130 mg/dL Final     Comment:     Meter: [id number] : [id number] Corrales Adriel NA   03/25/2023 1122 212 (H) 70 - 130 mg/dL Final     Comment:     Meter: [id number] : [id number] Antoni Morel NA   03/25/2023 0625 221 (H) 70 - 130 mg/dL Final     Comment:     Meter: [id number] : [id number] Juan HILLS   03/24/2023 2059 210 (H) 70 - 130 mg/dL Final     Comment:     Meter: [id number] : [id number] Juan HILLS       No radiology results for the last day  I have personally reviewed all medications:  Scheduled Medications  allopurinol, 100 mg, Oral, Q PM  amitriptyline, 25 mg, Oral, Nightly  amLODIPine, 10 mg, Oral, QAM  atorvastatin, 40 mg, Oral, Nightly  DULoxetine, 60 mg, Oral,  Daily  enoxaparin, 40 mg, Subcutaneous, Daily  fenofibrate, 145 mg, Oral, Daily  ferrous sulfate, 325 mg, Oral, Daily With Breakfast  finasteride, 5 mg, Oral, Daily  losartan, 100 mg, Oral, Q24H   And  hydroCHLOROthiazide, 25 mg, Oral, Q24H  insulin glargine, 30 Units, Subcutaneous, Nightly  insulin lispro, 0-14 Units, Subcutaneous, TID AC  nebivolol, 10 mg, Oral, QAM  pantoprazole, 40 mg, Oral, Q AM  piperacillin-tazobactam, 3.375 g, Intravenous, Q8H  pregabalin, 200 mg, Oral, Daily  sodium chloride, 10 mL, Intravenous, Q12H  sodium hypochlorite, , Topical, BID  tamsulosin, 0.4 mg, Oral, Daily  terazosin, 2 mg, Oral, Nightly  tiZANidine, 4 mg, Oral, Nightly  traZODone, 100 mg, Oral, Nightly  vancomycin, 1,250 mg, Intravenous, Q24H    Infusions  lactated ringers, 9 mL/hr  Pharmacy to dose vancomycin,   sodium chloride, 100 mL/hr, Last Rate: 100 mL/hr (03/26/23 0928)    Diet  Diet: Cardiac Diets, Diabetic Diets; Healthy Heart (2-3 Na+); Consistent Carbohydrate; Texture: Regular Texture (IDDSI 7); Fluid Consistency: Thin (IDDSI 0)    I have personally reviewed:  [x]  Laboratory   [x]  Microbiology   [x]  Radiology   [x]  EKG/Telemetry  [x]  Cardiology/Vascular   [x]  Pathology    [x]  Records       Assessment/Plan     Active Hospital Problems    Diagnosis  POA   • **Cellulitis of left foot [L03.116]  Yes   • Hyponatremia [E87.1]  Unknown   • 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       1. Sepsis/left diabetic foot infection, currently patient is on vancomycin and Zosyn and will be continued.  MRI revealed extensive soft tissue  involvement and therefore was taken to the operating room on 3/24/2023 and underwent debridement.  Appreciate infectious disease and vascular input.  Vascular surgery may consider lower extremity arteriogram.    2.  Diabetes mellitus, blood sugars are better controlled on Lantus 30 units in will continue with corrective dose insulin.  Check hemoglobin A1c.    3.  Hypertension, on amlodipine, HCTZ, Cozaar, Bystolic, terazosin which be continued.    4.  Neuropathy, on Lyrica.    5.  BPH, on finasteride and Flomax.    6.  Anemia of chronic disease, no evidence of an active bleeding.  Monitor H&H closely.    7.  CKD stage III, creatinine is at baseline.    8.  Clear-cell carcinoma of the kidney, advised him to follow-up with primary oncology as an outpatient basis    Copied text on this note has been reviewed by me on 3/26/2023      Dani Young MD  [facility]  03/26/23  15:00 EDT

## 2023-03-27 PROBLEM — N17.9 AKI (ACUTE KIDNEY INJURY): Status: ACTIVE | Noted: 2023-03-27

## 2023-03-27 PROBLEM — R19.7 DIARRHEA: Status: ACTIVE | Noted: 2023-03-27

## 2023-03-27 LAB
BACTERIA SPEC AEROBE CULT: NORMAL
BACTERIA SPEC AEROBE CULT: NORMAL
CREAT SERPL-MCNC: 2.11 MG/DL (ref 0.76–1.27)
EGFRCR SERPLBLD CKD-EPI 2021: 32.2 ML/MIN/1.73
GLUCOSE BLDC GLUCOMTR-MCNC: 100 MG/DL (ref 70–130)
GLUCOSE BLDC GLUCOMTR-MCNC: 138 MG/DL (ref 70–130)
GLUCOSE BLDC GLUCOMTR-MCNC: 146 MG/DL (ref 70–130)
GLUCOSE BLDC GLUCOMTR-MCNC: 176 MG/DL (ref 70–130)

## 2023-03-27 PROCEDURE — 82962 GLUCOSE BLOOD TEST: CPT

## 2023-03-27 PROCEDURE — 25010000002 VANCOMYCIN 10 G RECONSTITUTED SOLUTION: Performed by: SURGERY

## 2023-03-27 PROCEDURE — 82565 ASSAY OF CREATININE: CPT | Performed by: SURGERY

## 2023-03-27 PROCEDURE — 25010000002 ENOXAPARIN PER 10 MG: Performed by: SURGERY

## 2023-03-27 PROCEDURE — 99232 SBSQ HOSP IP/OBS MODERATE 35: CPT | Performed by: STUDENT IN AN ORGANIZED HEALTH CARE EDUCATION/TRAINING PROGRAM

## 2023-03-27 PROCEDURE — 97162 PT EVAL MOD COMPLEX 30 MIN: CPT

## 2023-03-27 PROCEDURE — 25010000002 VANCOMYCIN 5 G RECONSTITUTED SOLUTION: Performed by: INTERNAL MEDICINE

## 2023-03-27 PROCEDURE — 63710000001 INSULIN LISPRO (HUMAN) PER 5 UNITS: Performed by: SURGERY

## 2023-03-27 PROCEDURE — 25010000002 PIPERACILLIN SOD-TAZOBACTAM PER 1 G: Performed by: SURGERY

## 2023-03-27 PROCEDURE — 97530 THERAPEUTIC ACTIVITIES: CPT

## 2023-03-27 RX ORDER — LOPERAMIDE HYDROCHLORIDE 2 MG/1
2 CAPSULE ORAL ONCE
Status: COMPLETED | OUTPATIENT
Start: 2023-03-27 | End: 2023-03-27

## 2023-03-27 RX ADMIN — TAMSULOSIN HYDROCHLORIDE 0.4 MG: 0.4 CAPSULE ORAL at 08:23

## 2023-03-27 RX ADMIN — HYDROCODONE BITARTRATE AND ACETAMINOPHEN 1 TABLET: 7.5; 325 TABLET ORAL at 02:09

## 2023-03-27 RX ADMIN — SODIUM CHLORIDE 100 ML/HR: 9 INJECTION, SOLUTION INTRAVENOUS at 06:26

## 2023-03-27 RX ADMIN — ENOXAPARIN SODIUM 40 MG: 100 INJECTION SUBCUTANEOUS at 08:23

## 2023-03-27 RX ADMIN — PANTOPRAZOLE SODIUM 40 MG: 40 TABLET, DELAYED RELEASE ORAL at 06:26

## 2023-03-27 RX ADMIN — HYDROCODONE BITARTRATE AND ACETAMINOPHEN 1 TABLET: 7.5; 325 TABLET ORAL at 08:21

## 2023-03-27 RX ADMIN — VANCOMYCIN HYDROCHLORIDE 1250 MG: 10 INJECTION, POWDER, LYOPHILIZED, FOR SOLUTION INTRAVENOUS at 02:06

## 2023-03-27 RX ADMIN — SODIUM HYPOCHLORITE 946 ML: 1.25 SOLUTION TOPICAL at 14:05

## 2023-03-27 RX ADMIN — Medication 10 ML: at 08:24

## 2023-03-27 RX ADMIN — LOSARTAN POTASSIUM 100 MG: 100 TABLET, FILM COATED ORAL at 08:23

## 2023-03-27 RX ADMIN — HYDROCHLOROTHIAZIDE 25 MG: 25 TABLET ORAL at 08:23

## 2023-03-27 RX ADMIN — FENOFIBRATE 145 MG: 145 TABLET, FILM COATED ORAL at 08:22

## 2023-03-27 RX ADMIN — HYDROCODONE BITARTRATE AND ACETAMINOPHEN 1 TABLET: 7.5; 325 TABLET ORAL at 17:07

## 2023-03-27 RX ADMIN — SODIUM CHLORIDE 100 ML/HR: 9 INJECTION, SOLUTION INTRAVENOUS at 17:07

## 2023-03-27 RX ADMIN — Medication 10 ML: at 20:07

## 2023-03-27 RX ADMIN — INSULIN GLARGINE-YFGN 30 UNITS: 100 INJECTION, SOLUTION SUBCUTANEOUS at 20:48

## 2023-03-27 RX ADMIN — TRAZODONE HYDROCHLORIDE 100 MG: 100 TABLET ORAL at 20:07

## 2023-03-27 RX ADMIN — TAZOBACTAM SODIUM AND PIPERACILLIN SODIUM 3.38 G: 375; 3 INJECTION, SOLUTION INTRAVENOUS at 17:07

## 2023-03-27 RX ADMIN — VANCOMYCIN HYDROCHLORIDE 1500 MG: 10 INJECTION, POWDER, LYOPHILIZED, FOR SOLUTION INTRAVENOUS at 14:04

## 2023-03-27 RX ADMIN — LOPERAMIDE HYDROCHLORIDE 2 MG: 2 CAPSULE ORAL at 12:12

## 2023-03-27 RX ADMIN — TAZOBACTAM SODIUM AND PIPERACILLIN SODIUM 3.38 G: 375; 3 INJECTION, SOLUTION INTRAVENOUS at 02:06

## 2023-03-27 RX ADMIN — SODIUM HYPOCHLORITE: 1.25 SOLUTION TOPICAL at 08:22

## 2023-03-27 RX ADMIN — ALLOPURINOL 100 MG: 100 TABLET ORAL at 17:07

## 2023-03-27 RX ADMIN — ATORVASTATIN CALCIUM 40 MG: 20 TABLET, FILM COATED ORAL at 20:07

## 2023-03-27 RX ADMIN — INSULIN LISPRO 3 UNITS: 100 INJECTION, SOLUTION INTRAVENOUS; SUBCUTANEOUS at 17:07

## 2023-03-27 RX ADMIN — AMLODIPINE BESYLATE 10 MG: 10 TABLET ORAL at 06:27

## 2023-03-27 RX ADMIN — PREGABALIN 200 MG: 100 CAPSULE ORAL at 08:22

## 2023-03-27 RX ADMIN — NEBIVOLOL 10 MG: 10 TABLET ORAL at 06:26

## 2023-03-27 RX ADMIN — AMITRIPTYLINE HYDROCHLORIDE 25 MG: 25 TABLET, FILM COATED ORAL at 20:07

## 2023-03-27 RX ADMIN — FINASTERIDE 5 MG: 5 TABLET, FILM COATED ORAL at 08:22

## 2023-03-27 RX ADMIN — TIZANIDINE 4 MG: 4 TABLET ORAL at 20:07

## 2023-03-27 RX ADMIN — FERROUS SULFATE TAB 325 MG (65 MG ELEMENTAL FE) 325 MG: 325 (65 FE) TAB at 08:23

## 2023-03-27 RX ADMIN — TAZOBACTAM SODIUM AND PIPERACILLIN SODIUM 3.38 G: 375; 3 INJECTION, SOLUTION INTRAVENOUS at 08:21

## 2023-03-27 RX ADMIN — DULOXETINE HYDROCHLORIDE 60 MG: 60 CAPSULE, DELAYED RELEASE ORAL at 08:22

## 2023-03-27 NOTE — PROGRESS NOTES
LOS: 5 days     Chief Complaint: Diabetic foot infection    Interval History: Patient reports doing well this morning.  Tolerating antibiotics well.  States his foot is sore after some attempt to express purulence from the foot this morning.  Fever curve improved.  Leukocytosis resolved.    Vital Signs  Temp:  [97.4 °F (36.3 °C)-98.6 °F (37 °C)] 98.4 °F (36.9 °C)  Heart Rate:  [60-69] 60  Resp:  [16-18] 16  BP: (110-149)/(59-75) 143/71    Physical Exam:  General: In no acute distress  HEENT: Oropharynx clear, moist mucous membranes  Cardiovascular: RRR  Respiratory: Normal work of breathing  GI: Soft, NT/ND  Skin: Improving erythema of the left foot  Extremities: Edema improving in the left foot with minimal bloody discharge on the bandage.  Access: Peripheral IV.    Antibiotics:  Anti-Infectives (From admission, onward)    Ordered     Dose/Rate Route Frequency Start Stop    03/27/23 0938  vancomycin 1500 mg/500 mL 0.9% NS IVPB ([facility])        Ordering Provider: Baltazar Oh MD    1,500 mg  over 90 Minutes Intravenous Every 24 Hours 03/27/23 1500 04/01/23 1459    03/27/23 0806  Pharmacy to dose vancomycin        Ordering Provider: Baltazar Oh MD     Does not apply Continuous PRN 03/27/23 0806 04/01/23 0805    03/25/23 1845  vancomycin 1250 mg/250 mL 0.9% NS IVPB ([facility])        Ordering Provider: Noah Pritchard MD    1,250 mg  over 60 Minutes Intravenous Every 24 Hours 03/26/23 0200 03/27/23 0306    03/23/23 0835  vancomycin (VANCOCIN) 1000 mg/200 mL dextrose 5% IVPB        Ordering Provider: Anuradha Desouza APRN    1,000 mg  over 60 Minutes Intravenous Once 03/23/23 1030 03/23/23 1332    03/22/23 2315  piperacillin-tazobactam (ZOSYN) 3.375 g in iso-osmotic dextrose 50 ml (premix)        Ordering Provider: Baltazar Oh MD    3.375 g  over 4 Hours Intravenous Every 8 Hours 03/23/23 0030 04/01/23 0805    03/22/23 1744  piperacillin-tazobactam (ZOSYN) 4.5 g in iso-osmotic dextrose 100 mL IVPB  (premix)        Ordering Provider: Richard Felipe II, MD    4.5 g  over 30 Minutes Intravenous Once 03/22/23 1746 03/22/23 1910    03/22/23 1741  vancomycin 2250 mg/500 mL 0.9% NS IVPB ([facility])        Ordering Provider: Richard Felipe II, MD    20 mg/kg × 113 kg Intravenous Once 03/22/23 1743 03/22/23 2238           Results Review:     I reviewed the patient's new clinical results.    Lab Results   Component Value Date    WBC 10.15 03/26/2023    HGB 10.6 (L) 03/26/2023    HCT 32.3 (L) 03/26/2023    MCV 85.7 03/26/2023     03/26/2023     Lab Results   Component Value Date    GLUCOSE 176 (H) 03/26/2023    BUN 47 (H) 03/26/2023    CREATININE 2.11 (H) 03/27/2023    EGFRIFNONA 29 (L) 09/10/2021    EGFRIFAFRI 32 (L) 01/18/2022    BCR 20.4 03/26/2023    CO2 22.0 03/26/2023    CALCIUM 9.2 03/26/2023    ALBUMIN 3.0 (L) 03/22/2023    LABIL2 1.1 07/01/2020    AST 20 03/22/2023    ALT 10 03/22/2023       Microbiology:  3/22 blood cultures no growth to date  3/24 OR cultures gram-positive cocci and gram-negative bacilli    New imaging:  3/23 MRI left foot with report reviewed showing soft tissue wound with sinus tract extending towards the first webspace.  Diffuse soft tissue edema and fairly extensive soft tissue gas around the lateral half of the great toe medial base of the second toe and extending into the soft tissue of the tissue dorsal to the second metatarsal head.  No identifiable abscesses or osteomyelitis.    Assessment    #Left diabetic foot infection status post irrigation and debridement on 3/24  #Type 2 diabetes  #MARTIN on CKD  #Renal clear-cell carcinoma     Continue IV vancomycin goal -600 and Zosyn 3.375 every 8 hours while following up operative culture.  Hopefully will be able to de-escalate this once ID and susceptibilities returned.  MRI was without evidence of osteomyelitis and operative findings mention that the bony areas appeared intact.  Given that these were visualized and the  proximity of infection however, I lean towards treating for osteomyelitis in the event that he did have some small degree of involvement of the bone.  This would dictate 6 weeks of therapy which will be based on cultures.      ID will follow.

## 2023-03-27 NOTE — CASE MANAGEMENT/SOCIAL WORK
Discharge Planning Assessment  [facility]     Patient Name: Tomy Manjarrez  MRN: [id number]  Today's Date: 3/27/2023    Admit Date: 3/22/2023    Plan: Home with family- PT eval pending   Discharge Needs Assessment     Row Name 03/27/23 1987       Living Environment    People in Home sibling(s)    Name(s) of People in Home Gaby Mcelroy    Current Living Arrangements home    Potentially Unsafe Housing Conditions none    Primary Care Provided by self    Provides Primary Care For no one    Family Caregiver if Needed child(ayaz), adult;sibling(s)    Family Caregiver Names DaughterElham [phone] and SisterGaby    Quality of Family Relationships helpful;involved;supportive    Able to Return to Prior Arrangements yes       Resource/Environmental Concerns    Resource/Environmental Concerns none    Transportation Concerns none       Transition Planning    Patient/Family Anticipates Transition to home with family    Patient/Family Anticipated Services at Transition none    Transportation Anticipated family or friend will provide       Discharge Needs Assessment    Equipment Currently Used at Home cane, straight;walker, standard    Concerns to be Addressed discharge planning               Discharge Plan     Row Name 03/27/23 5824       Plan    Plan Home with family- PT eval pending    Patient/Family in Agreement with Plan yes    Plan Comments CCP spoke with patient and patient's daughter at bedside; CCP role explained, face sheet verified, and discharge plan discussed. Patient resides with sister in one-level home with no steps to enter. Patient currently uses cane and walker. Patient mentioned possibly needing a bedside commode at discharge but declined referral at this time. Patient reports he has used HH services in the past but cannot recall which agency. Patient is agreeable to [facility] if needed in the future. Denies use of SNF in the past. Patient currently has wound vac, CCP following to see if he will  need wound vac at discharge. PT eval pending; following for level of care recommendations. Mariza RUBIO LCSW              Continued Care and Services - Admitted Since 3/22/2023    Coordination has not been started for this encounter.     Selected Continued Care - Episodes Includes continued care and service providers with selected services from the active episodes listed below    [facility] Episode start date: 3/16/2023   There are no active outsourced providers for this episode.                  Demographic Summary     Row Name 03/27/23 1332       General Information    Admission Type inpatient    Arrived From home    Reason for Consult discharge planning    Preferred Language English               Functional Status     Row Name 03/27/23 1332       Functional Status    Usual Activity Tolerance good    Current Activity Tolerance good       Functional Status, IADL    Medications assistive equipment    Meal Preparation assistive equipment    Housekeeping assistive equipment    Laundry assistive equipment    Shopping assistive equipment       Mental Status    General Appearance WDL WDL               Psychosocial    No documentation.                Abuse/Neglect    No documentation.                Legal    No documentation.                Substance Abuse    No documentation.                Patient Forms    No documentation.                   Mariza Valentine

## 2023-03-27 NOTE — PLAN OF CARE
Problem: Adult Inpatient Plan of Care  Goal: Plan of Care Review  Outcome: Ongoing, Progressing  Flowsheets (Taken 3/27/2023 1731)  Progress: improving  Plan of Care Reviewed With: patient   Goal Outcome Evaluation:  Plan of Care Reviewed With: patient        Progress: improving       Pt sat up in the chair for a few hours. He was able to work with therapy. Pt now has a wound vac to the left foot which is instilling dakins solution. Pt is NPO at midnight for an arteriogram with possible intervention and possible wound debridement. Pt. Pain well controlled with prn pain medications. Pt getting IV fluids and IV antibiotics. VSS.

## 2023-03-27 NOTE — PROGRESS NOTES
Name: Tomy Manjarrez ADMIT: 3/22/2023   : 1950  PCP: Zoya Claros PA    MRN: [id number] LOS: 5 days   AGE/SEX: 73 y.o. male  ROOM: [facility]     Subjective   Subjective   Feeling pretty good today. Tolerating diet. Making urine. No N/V/abd pain. C/o diarrhea since admission. No blood.   Pain in left foot is well-controlled    Review of Systems   No F/C/NS/SOA/CP/palp/HA/vis changes    Objective   Objective   Vital Signs  Temp:  [97.4 °F (36.3 °C)-98.6 °F (37 °C)] 98.4 °F (36.9 °C)  Heart Rate:  [60-69] 62  Resp:  [16-18] 16  BP: (110-149)/(59-75) 134/62  SpO2:  [94 %-100 %] 94 %  on   ;   Device (Oxygen Therapy): room air  Body mass index is 33.95 kg/m².  Physical Exam  Vitals and nursing note reviewed. Exam conducted with a chaperone present (RN and family x 2).   Constitutional:       General: He is not in acute distress.     Appearance: He is ill-appearing (chronically). He is not toxic-appearing or diaphoretic.   HENT:      Head: Normocephalic.      Nose: Nose normal.      Mouth/Throat:      Mouth: Mucous membranes are moist.      Pharynx: Oropharynx is clear.   Eyes:      General: No scleral icterus.        Right eye: No discharge.         Left eye: No discharge.      Extraocular Movements: Extraocular movements intact.      Conjunctiva/sclera: Conjunctivae normal.   Cardiovascular:      Rate and Rhythm: Normal rate and regular rhythm.      Pulses: Normal pulses.   Pulmonary:      Effort: Pulmonary effort is normal. No respiratory distress.      Breath sounds: Normal breath sounds. No wheezing or rales.   Abdominal:      General: Bowel sounds are normal. There is no distension.      Palpations: Abdomen is soft.      Tenderness: There is no abdominal tenderness.   Genitourinary:     Comments: Sandoval in place, yellow urine in bag  Musculoskeletal:         General: No swelling or deformity. Normal range of motion.      Cervical back: Neck supple. No rigidity.      Comments: Dressing and  wound vac to left foot  NVI in distal BLEs   Lymphadenopathy:      Cervical: No cervical adenopathy.   Skin:     General: Skin is warm and dry.      Capillary Refill: Capillary refill takes less than 2 seconds.      Coloration: Skin is not jaundiced.      Findings: No rash.   Neurological:      General: No focal deficit present.      Mental Status: He is alert and oriented to person, place, and time. Mental status is at baseline.      Cranial Nerves: No cranial nerve deficit.      Coordination: Coordination normal.   Psychiatric:         Mood and Affect: Mood normal.         Behavior: Behavior normal.         Thought Content: Thought content normal.       Results Review     I reviewed the patient's new clinical results.  Results from last 7 days   Lab Units 03/26/23 0455 03/25/23 0523 03/24/23 0527 03/23/23 0453   WBC 10*3/mm3 10.15 12.02* 15.29* 15.23*   HEMOGLOBIN g/dL 10.6* 10.9* 10.0* 11.0*   PLATELETS 10*3/mm3 214 193 167 166     Results from last 7 days   Lab Units 03/27/23  0430 03/26/23  0455 03/25/23 0523 03/24/23 0527 03/23/23 0453   SODIUM mmol/L  --  135* 137 137 134*   POTASSIUM mmol/L  --  4.1 4.3 4.0 4.1   CHLORIDE mmol/L  --  105 104 104 100   CO2 mmol/L  --  22.0 22.0 23.0 25.0   BUN mg/dL  --  47* 43* 44* 46*   CREATININE mg/dL 2.11* 2.30* 2.25* 2.53* 2.72*   GLUCOSE mg/dL  --  176* 235* 193* 196*   EGFR mL/min/1.73 32.2* 29.1* 29.8* 25.9* 23.8*     Results from last 7 days   Lab Units 03/22/23  2335   ALBUMIN g/dL 3.0*   BILIRUBIN mg/dL 0.7   ALK PHOS U/L 209*   AST (SGOT) U/L 20   ALT (SGPT) U/L 10     Results from last 7 days   Lab Units 03/26/23  0455 03/25/23 0523 03/24/23  0527 03/23/23  0453 03/22/23  2335   CALCIUM mg/dL 9.2 9.0 8.6 8.3* 8.9   ALBUMIN g/dL  --   --   --   --  3.0*     Results from last 7 days   Lab Units 03/22/23  2335 03/22/23  1830   PROCALCITONIN ng/mL 1.89*  --    LACTATE mmol/L  --  1.2     Glucose   Date/Time Value Ref Range Status   03/27/2023 1044 146 (H) 70  - 130 mg/dL Final     Comment:     Meter: [id number] : [id number] Sd Cowan NA   03/27/2023 0642 100 70 - 130 mg/dL Final     Comment:     Meter: [id number] : [id number] Rajiv Dickerson NA   03/26/2023 2056 188 (H) 70 - 130 mg/dL Final     Comment:     Meter: [id number] : [id number] Sd Cowan NA   03/26/2023 1609 164 (H) 70 - 130 mg/dL Final     Comment:     Meter: [id number] : [id number] Rosanna Rodriguezna NA   03/26/2023 1203 145 (H) 70 - 130 mg/dL Final     Comment:     Meter: [id number] : [id number] Rosanna Rodriguezna NA   03/26/2023 0603 159 (H) 70 - 130 mg/dL Final     Comment:     Meter: [id number] : [id number] Dean HILLS   03/25/2023 2058 231 (H) 70 - 130 mg/dL Final     Comment:     Meter: [id number] : [id number] Dean Holley REINIER       No radiology results for the last day  I have personally reviewed all medications:  Scheduled Medications  allopurinol, 100 mg, Oral, Q PM  amitriptyline, 25 mg, Oral, Nightly  amLODIPine, 10 mg, Oral, QAM  atorvastatin, 40 mg, Oral, Nightly  DULoxetine, 60 mg, Oral, Daily  enoxaparin, 40 mg, Subcutaneous, Daily  fenofibrate, 145 mg, Oral, Daily  ferrous sulfate, 325 mg, Oral, Daily With Breakfast  finasteride, 5 mg, Oral, Daily  losartan, 100 mg, Oral, Q24H   And  hydroCHLOROthiazide, 25 mg, Oral, Q24H  insulin glargine, 30 Units, Subcutaneous, Nightly  insulin lispro, 0-14 Units, Subcutaneous, TID AC  nebivolol, 10 mg, Oral, QAM  pantoprazole, 40 mg, Oral, Q AM  piperacillin-tazobactam, 3.375 g, Intravenous, Q8H  pregabalin, 200 mg, Oral, Daily  sodium chloride, 10 mL, Intravenous, Q12H  tamsulosin, 0.4 mg, Oral, Daily  tiZANidine, 4 mg, Oral, Nightly  traZODone, 100 mg, Oral, Nightly  vancomycin, 1,500 mg, Intravenous, Q24H    Infusions  lactated ringers, 9 mL/hr  Pharmacy to dose vancomycin,   sodium chloride, 100 mL/hr, Last Rate: 100 mL/hr (03/27/23 0626)    Diet  Diet: Cardiac Diets, Diabetic Diets; Healthy Heart (2-3 Na+);  Consistent Carbohydrate; Texture: Regular Texture (IDDSI 7); Fluid Consistency: Thin (IDDSI 0)  NPO Diet NPO Type: Strict NPO    I have personally reviewed:  [x]  Laboratory   [x]  Microbiology   [x]  Radiology   [x]  EKG/Telemetry  []  Cardiology/Vascular   []  Pathology    [x]  Records       Assessment/Plan     Active Hospital Problems    Diagnosis  POA   • **Cellulitis of left foot [L03.116]  Yes   • MARTIN (acute kidney injury) (HCC) [N17.9]  Yes   • Diarrhea [R19.7]  No   • Hyponatremia [E87.1]  Yes   • 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       74yo gentleman with left DFU    Left DFU: no osteo on MRI, appreciate ID and Vasc attention to pt, s/p I&D on 3/24 by Dr. Oh, cultures in progress, continue IV Vanc/Zosyn for now, ID leaning toward treating with 6 weeks of abx for possible osteo, Vasc plans A-gram tomorrow to assess circulation, irrigating wound vac planned for now    DM2  DPN: check A1c, sugars acceptable on current basal/bolus regimen, continue Lyrica    MARTIN/CKD3b: Cr stable at baseline now    Anemia of CKD: Hgb stable    HTN: BPs acceptable on amlodipine, losartan, HCTZ, Bystolic    BPH  Urinary retention: continue lee, continue Proscar and Flomax, f/u with  as outpt ([facility])    RCC: f/u with oncology as outpt    Diarrhea NOS: no abd pain or fever, WBC normalized, doubt CDiff, will give a single dose of Imodium and monitor closely      · Lovenox 40 mg SC daily for DVT prophylaxis.  · Full code.  · Discussed with patient, family and nursing staff.  · Anticipate discharge home with family, timing yet to be  determined.    >55min total time, all issues new to me today    Suman Leon MD  [facility]  03/27/23  11:39 EDT

## 2023-03-27 NOTE — PLAN OF CARE
Goal Outcome Evaluation:  Plan of Care Reviewed With: patient           Outcome Evaluation: Pt is a 74 yo M who is post-op L foot I and D. Pt now with L foot wound vac and is to wear a darco shoe when out of bed. Pt presents to PT with impaired functional mobility and gait secondary to LLE pain, weakness, and decreased activity tolerance. Pt may benefit from skilled PT to address strength, mobility, and gait.

## 2023-03-27 NOTE — PROGRESS NOTES
FootName: Tomy Manjarrez ADMIT: 3/22/2023   : 1950  PCP: Zoya Claros PA    MRN: [id number] LOS: 5 days   AGE/SEX: 73 y.o. male  ROOM: [address]    [place] [employer]    Chief Complaint   Patient presents with   • Foot Pain   • Numbness     CC: Foot I&D follow-up peripheral arterial disease follow-up  Subjective     73 y.o. male patient resting comfortably.  Moderate discomfort    Review of Systems some pain of the foot    Objective     Scheduled Medications:   allopurinol, 100 mg, Oral, Q PM  amitriptyline, 25 mg, Oral, Nightly  amLODIPine, 10 mg, Oral, QAM  atorvastatin, 40 mg, Oral, Nightly  DULoxetine, 60 mg, Oral, Daily  enoxaparin, 40 mg, Subcutaneous, Daily  fenofibrate, 145 mg, Oral, Daily  ferrous sulfate, 325 mg, Oral, Daily With Breakfast  finasteride, 5 mg, Oral, Daily  losartan, 100 mg, Oral, Q24H   And  hydroCHLOROthiazide, 25 mg, Oral, Q24H  insulin glargine, 30 Units, Subcutaneous, Nightly  insulin lispro, 0-14 Units, Subcutaneous, TID AC  nebivolol, 10 mg, Oral, QAM  pantoprazole, 40 mg, Oral, Q AM  piperacillin-tazobactam, 3.375 g, Intravenous, Q8H  pregabalin, 200 mg, Oral, Daily  sodium chloride, 10 mL, Intravenous, Q12H  sodium hypochlorite, , Topical, BID  tamsulosin, 0.4 mg, Oral, Daily  tiZANidine, 4 mg, Oral, Nightly  traZODone, 100 mg, Oral, Nightly        Active Infusions:  lactated ringers, 9 mL/hr  Pharmacy to dose vancomycin,   Pharmacy to dose vancomycin,   sodium chloride, 100 mL/hr, Last Rate: 100 mL/hr (23)        As Needed Medications:  •  acetaminophen  •  dextrose  •  dextrose  •  glucagon (human recombinant)  •  HYDROcodone-acetaminophen  •  HYDROmorphone **AND** naloxone  •  nitroglycerin  •  ondansetron **OR** ondansetron  •  Pharmacy to dose vancomycin  •  Pharmacy to dose vancomycin  •  sodium chloride  •  sodium chloride    Vital Signs  Vital Signs   Patient Vitals for the past 24 hrs:   BP Temp Temp src Pulse Resp  SpO2 Weight   03/27/23 0626 139/64 98.4 °F (36.9 °C) Oral 61 16 100 % --   03/27/23 0531 -- -- -- -- -- -- 123 kg (271 lb 9.7 oz)   03/26/23 2250 123/69 98.4 °F (36.9 °C) Oral 61 18 97 % --   03/26/23 1900 110/59 98.6 °F (37 °C) Oral 67 16 98 % --   03/26/23 1500 149/75 97.4 °F (36.3 °C) Oral 69 16 -- --   03/26/23 1053 139/71 97.8 °F (36.6 °C) Oral 63 16 -- --     I/O:  I/O last 3 completed shifts:  In: 5288 [P.O.:480; I.V.:4208; IV Piggyback:600]  Out: 5875 [Urine:5875]    Physical Exam:  Physical Exam   Foot wound with viable tissue but some pus still expressible.    Results Review:     CBC    Results from last 7 days   Lab Units 03/26/23  0455 03/25/23  0523 03/24/23  0527 03/23/23  0453 03/22/23  1830   WBC 10*3/mm3 10.15 12.02* 15.29* 15.23* 15.53*   HEMOGLOBIN g/dL 10.6* 10.9* 10.0* 11.0* 11.5*   PLATELETS 10*3/mm3 214 193 167 166 176     BMP   Results from last 7 days   Lab Units 03/27/23  0430 03/26/23  0455 03/25/23  0523 03/24/23  0527 03/23/23  0453 03/22/23  2335 03/22/23  1830   SODIUM mmol/L  --  135* 137 137 134* 134* 131*   POTASSIUM mmol/L  --  4.1 4.3 4.0 4.1 4.2 4.4   CHLORIDE mmol/L  --  105 104 104 100 100 96*   CO2 mmol/L  --  22.0 22.0 23.0 25.0 20.8* 23.0   BUN mg/dL  --  47* 43* 44* 46* 48* 49*   CREATININE mg/dL 2.11* 2.30* 2.25* 2.53* 2.72* 2.96* 3.12*   GLUCOSE mg/dL  --  176* 235* 193* 196* 218* 219*       Assessment & Plan     Assessment & Plan      Cellulitis of left foot    Type 2 diabetes mellitus with hyperglycemia, with long-term current use of insulin (HCC)    Hypertension    MARTIN (acute kidney injury) (HCC)    CKD (chronic kidney disease) stage 3, GFR 30-59 ml/min (HCC)    Essential hypertension    Clear cell carcinoma of kidney (HCC)    Hyponatremia        73 y.o. male left diabetic foot infection with mal perforans ulcer.  Postop day 3 status post I&D of foot wound.  Some mild purulence still expressible.  No evidence of deeper space infection however.  Patient has questionable  perfusion based on exam.  We will pursue arteriogram tomorrow and possible or irrigation and debridement of foot.  We will place irrigating wound VAC now.    Baltazar Oh MD  03/27/23  08:08 EDT    Please call my office with any question: [phone]    Active Hospital Problems    Diagnosis  POA   • **Cellulitis of left foot [L03.116]  Yes   • Hyponatremia [E87.1]  Unknown   • Clear cell carcinoma of kidney (HCC) [C64.9]  Yes   • Essential hypertension [I10]  Yes   • Type 2 diabetes mellitus with hyperglycemia, with long-term current use of insulin (HCC) [E11.65, Z79.4]  Not Applicable   • Hypertension [I10]  Yes   • MARTIN (acute kidney injury) (HCC) [N17.9]  Yes   • CKD (chronic kidney disease) stage 3, GFR 30-59 ml/min (HCC) [N18.30]  Yes      Resolved Hospital Problems   No resolved problems to display.

## 2023-03-27 NOTE — NURSING NOTE
"CWOCN- consult to place instillation vac on patient. Placed veraflow- starting with NS @ 12ml instillation every 2 hours for 10 mins. Will place dakins when it arrives to the floor.   Patient tolerated well. He reports no pain but some \"throbbing\" since he has had a couple dressing changes this am. Plan M-W-F dressing changes. Await further plan per MD.               03/27/23 1001   Wound 03/24/23 Left anterior foot Incision   Placement Date: 03/24/23   Present on Hospital Admission: No  Side: Left  Orientation: anterior  Location: foot  Primary Wound Type: Incision   Dressing Appearance intact   Base clean;moist;pink;red;yellow   Periwound pale white;pink   Periwound Temperature warm   Periwound Skin Turgor soft   Edges open   Wound Length (cm) 9 cm   Wound Width (cm) 2 cm   Wound Depth (cm) 2 cm   Wound Surface Area (cm^2) 18 cm^2   Wound Volume (cm^3) 36 cm^3   Drainage Characteristics/Odor serosanguineous   Drainage Amount scant   Care, Wound cleansed with;sterile normal saline;negative pressure wound therapy   Dressing Care dressing changed   Periwound Care dry periwound area maintained   NPWT (Negative Pressure Wound Therapy) 03/27/23 1000 left foot   Placement Date/Time: 03/27/23 1000   Location: left foot   Therapy Setting continuous therapy   Dressing other (see comments)  (blue- cleanse choice complete)   Instillation normal saline  (dakins has been ordered)   Pressure Setting 125 mmHg   Sponges Inserted 3   Finger sweep complete Yes       "

## 2023-03-27 NOTE — THERAPY EVALUATION
Patient Name: Tomy Manjarrez  : 1950    MRN: [id number]                              Today's Date: 3/27/2023       Admit Date: 3/22/2023    Visit Dx:     ICD-10-CM ICD-9-CM   1. Cellulitis of left foot  L03.116 682.7   2. Diabetic foot infection (HCC)  E11.628 250.80    L08.9 686.9     Patient Active Problem List   Diagnosis   • Left renal mass   • Type 2 diabetes mellitus with hyperglycemia, with long-term current use of insulin (HCC)   • Hypertension   • Diabetic neuropathy (HCC)   • Alcohol use   • Essential hypertension   • Chronic renal insufficiency, stage III (moderate) (HCC)   • Prostatocystitis   • Hyperglycemia due to type 2 diabetes mellitus (HCC)   • Diabetic peripheral neuropathy associated with type 2 diabetes mellitus (HCC)   • Stage 3b chronic kidney disease (HCC)   • Mixed hyperlipidemia   • Diabetic peripheral neuropathy (HCC)   • Stage 3b chronic kidney disease (HCC)   • Diabetic peripheral neuropathy (HCC)   • Essential hypertension   • Type 2 diabetes mellitus with hyperglycemia, with long-term current use of insulin (HCC)   • BPH with urinary obstruction   • Clear cell carcinoma of kidney (HCC)   • Cellulitis of left foot   • Hyponatremia   • MARTIN (acute kidney injury) (HCC)   • Diarrhea     Past Medical History:   Diagnosis Date   • Arthritis    • BPH (benign prostatic hyperplasia)    • Chronic back pain    • Chronic kidney disease (CKD), stage III (moderate) (HCC)    • Diabetes mellitus (HCC)     TYPE 2   • Difficulty urinating     REQUIRING STRAIGHT CATH   • GERD (gastroesophageal reflux disease)    • Gout    • Hyperlipidemia    • Hypertension    • Left kidney mass    • Neuropathy     IN FEET   • Neuropathy in diabetes (HCC)    • Type 2 diabetes mellitus (HCC)      Past Surgical History:   Procedure Laterality Date   • CARDIAC CATHETERIZATION     • COLONOSCOPY     • CYSTOSCOPY TRANSURETHRAL RESECTION OF PROSTATE N/A 2021    Procedure: CYSTOSCOPY TRANSURETHRAL RESECTION OF  PROSTATE TRANSURETHRAL INCISION OF PROSTATE;  Surgeon: Tres Mena MD;  Location: [facility] MAIN OR;  Service: Urology;  Laterality: N/A;   • NEPHRECTOMY PARTIAL Left 11/19/2019    Procedure: LEFT PARTIAL NEPHRECTOMY X2, INTRAOPERATIVE RENAL ULTRASONOGRAPHY;  Surgeon: Tres Mena MD;  Location: [facility] MAIN OR;  Service: Urology   • WISDOM TOOTH EXTRACTION        General Information     Row Name 03/27/23 1624          Physical Therapy Time and Intention    Document Type evaluation  -     Mode of Treatment individual therapy;physical therapy  -     Row Name 03/27/23 1624          General Information    Prior Level of Function independent:;gait;transfer;bed mobility  no AD baseline  -     Existing Precautions/Restrictions fall;brace worn when out of bed  Darco shoe when OOB  -     Barriers to Rehab medically complex  -     Row Name 03/27/23 1624          Living Environment    People in Home sibling(s)  -     Row Name 03/27/23 1624          Home Main Entrance    Number of Stairs, Main Entrance none  -     Row Name 03/27/23 1624          Cognition    Orientation Status (Cognition) oriented x 4  -     Row Name 03/27/23 1624          Safety Issues, Functional Mobility    Impairments Affecting Function (Mobility) balance;pain;range of motion (ROM);strength;endurance/activity tolerance  -           User Key  (r) = Recorded By, (t) = Taken By, (c) = Cosigned By    Initials Name Provider Type     Christelle Gomes, PT Physical Therapist               Mobility     Row Name 03/27/23 1630          Bed Mobility    Bed Mobility supine-sit;sit-supine  -     Supine-Sit [place] (Bed Mobility) verbal cues;nonverbal cues (demo/gesture);contact guard  -     Sit-Supine [place] (Bed Mobility) verbal cues;nonverbal cues (demo/gesture);contact guard  -     Assistive Device (Bed Mobility) bed rails;head of bed elevated  -     Row Name 03/27/23 1630          Sit-Stand  Transfer    Sit-Stand [place] (Transfers) verbal cues;nonverbal cues (demo/gesture);minimum assist (75% patient effort)  -     Assistive Device (Sit-Stand Transfers) walker, front-wheeled  -     Row Name 03/27/23 1630          Gait/Stairs (Locomotion)    [place] Level (Gait) verbal cues;nonverbal cues (demo/gesture);minimum assist (75% patient effort)  -     Assistive Device (Gait) walker, front-wheeled  -     Distance in Feet (Gait) 8  -     Deviations/Abnormal Patterns (Gait) evens decreased;gait speed decreased;stride length decreased;antalgic  -     Bilateral Gait Deviations forward flexed posture  -     Comment, (Gait/Stairs) gait distance limited by pain and fatigue  -     Row Name 03/27/23 1630          Mobility    Extremity Weight-bearing Status left lower extremity  -     Left Lower Extremity (Weight-bearing Status) other (see comments)  heel weight bearing  -           User Key  (r) = Recorded By, (t) = Taken By, (c) = Cosigned By    Initials Name Provider Type    Christelle Murdock, PT Physical Therapist               Obj/Interventions     Row Name 03/27/23 1633          Range of Motion Comprehensive    General Range of Motion no range of motion deficits identified  -     Row Name 03/27/23 1633          Strength Comprehensive (MMT)    Comment, General Manual Muscle Testing (MMT) Assessment L LE weakness noted secondary to pain, B LE grossly >/=3+/5  -     Row Name 03/27/23 1633          Balance    Balance Assessment standing static balance;standing dynamic balance  -     Static Standing Balance verbal cues;non-verbal cues (demo/gesture);contact guard  -     Dynamic Standing Balance verbal cues;non-verbal cues (demo/gesture);minimal assist  -     Position/Device Used, Standing Balance walker, rolling  -           User Key  (r) = Recorded By, (t) = Taken By, (c) = Cosigned By    Initials Name Provider Type    Christelle Murdock, PT Physical Therapist                Goals/Plan     Row Name 03/27/23 1637          Bed Mobility Goal 1 (PT)    Activity/Assistive Device (Bed Mobility Goal 1, PT) bed mobility activities, all  -CH     [place] Level/Cues Needed (Bed Mobility Goal 1, PT) standby assist  -CH     Time Frame (Bed Mobility Goal 1, PT) 1 week  -CH     Row Name 03/27/23 1637          Transfer Goal 1 (PT)    Activity/Assistive Device (Transfer Goal 1, PT) transfers, all;walker, rolling  -CH     [place] Level/Cues Needed (Transfer Goal 1, PT) standby assist  -CH     Time Frame (Transfer Goal 1, PT) 1 week  -     Row Name 03/27/23 1637          Gait Training Goal 1 (PT)    Activity/Assistive Device (Gait Training Goal 1, PT) gait (walking locomotion);walker, rolling  -CH     [place] Level (Gait Training Goal 1, PT) standby assist  -CH     Distance (Gait Training Goal 1, PT) 100  -CH     Time Frame (Gait Training Goal 1, PT) 1 week  -     Row Name 03/27/23 1637          Therapy Assessment/Plan (PT)    Planned Therapy Interventions (PT) balance training;bed mobility training;gait training;home exercise program;patient/family education;strengthening;transfer training  -           User Key  (r) = Recorded By, (t) = Taken By, (c) = Cosigned By    Initials Name Provider Type    CH Christelle Gomes, PT Physical Therapist               Clinical Impression     Row Name 03/27/23 1638          Pain    Pretreatment Pain Rating 4/10  -CH     Posttreatment Pain Rating 4/10  -CH     Pain Location - Side/Orientation Left  -     Pain Location - foot  -     Pain Intervention(s) Repositioned  -     Row Name 03/27/23 1636          Plan of Care Review    Plan of Care Reviewed With patient  -     Outcome Evaluation Pt is a 74 yo M who is post-op L foot I and D. Pt now with L foot wound vac and is to wear a darco shoe when out of bed. Pt presents to PT with impaired functional mobility and gait secondary to LLE pain, weakness, and decreased activity tolerance. Pt  may benefit from skilled PT to address strength, mobility, and gait.  -     Row Name 03/27/23 1634          Therapy Assessment/Plan (PT)    Patient/Family Therapy Goals Statement (PT) to return to PLOF  -     Rehab Potential (PT) good, to achieve stated therapy goals  -     Criteria for Skilled Interventions Met (PT) skilled treatment is necessary  -     Therapy Frequency (PT) 6 times/wk  -     Row Name 03/27/23 1634          Positioning and Restraints    Pre-Treatment Position in bed  -     Post Treatment Position bed  -     In Bed supine;call light within reach;encouraged to call for assist;exit alarm on  -           User Key  (r) = Recorded By, (t) = Taken By, (c) = Cosigned By    Initials Name Provider Type    Christelle Murdock PT Physical Therapist               Outcome Measures     Row Name 03/27/23 1637          How much help from another person do you currently need...    Turning from your back to your side while in flat bed without using bedrails? 3  -CH     Moving from lying on back to sitting on the side of a flat bed without bedrails? 3  -CH     Moving to and from a bed to a chair (including a wheelchair)? 3  -CH     Standing up from a chair using your arms (e.g., wheelchair, bedside chair)? 3  -CH     Climbing 3-5 steps with a railing? 2  -CH     To walk in hospital room? 2  -CH     AM-PAC 6 Clicks Score (PT) 16  -CH     Highest level of mobility 5 --> Static standing  -     Row Name 03/27/23 1637          Functional Assessment    Outcome Measure Options AM-PAC 6 Clicks Basic Mobility (PT)  -           User Key  (r) = Recorded By, (t) = Taken By, (c) = Cosigned By    Initials Name Provider Type    Christelle Murdock, PT Physical Therapist                             Physical Therapy Education     Title: PT OT SLP Therapies (Done)     Topic: Physical Therapy (Done)     Point: Mobility training (Done)     Learning Progress Summary           Patient Acceptance, E,TB,D, VU,NR by   at 3/27/2023 1638                   Point: Home exercise program (Done)     Learning Progress Summary           Patient Acceptance, E,TB,D, VU,NR by  at 3/27/2023 1638                   Point: Body mechanics (Done)     Learning Progress Summary           Patient Acceptance, E,TB,D, VU,NR by  at 3/27/2023 1638                   Point: Precautions (Done)     Learning Progress Summary           Patient Acceptance, E,TB,D, VU,NR by  at 3/27/2023 1638                               User Key     Initials Effective Dates Name Provider Type Discipline     06/16/21 -  Christelle Gomes PT Physical Therapist PT              PT Recommendation and Plan  Planned Therapy Interventions (PT): balance training, bed mobility training, gait training, home exercise program, patient/family education, strengthening, transfer training  Plan of Care Reviewed With: patient  Outcome Evaluation: Pt is a 72 yo M who is post-op L foot I and D. Pt now with L foot wound vac and is to wear a darco shoe when out of bed. Pt presents to PT with impaired functional mobility and gait secondary to LLE pain, weakness, and decreased activity tolerance. Pt may benefit from skilled PT to address strength, mobility, and gait.     Time Calculation:    PT Charges     Row Name 03/27/23 1638             Time Calculation    Start Time 1550  -      Stop Time 1602  -      Time Calculation (min) 12 min  -      PT Received On 03/27/23  -      PT - Next Appointment 03/28/23  -      PT Goal Re-Cert Due Date 04/03/23  -         Time Calculation- PT    Total Timed Code Minutes- PT 8 minute(s)  -         Timed Charges    67582 - PT Therapeutic Activity Minutes 8  -CH         Total Minutes    Timed Charges Total Minutes 8  -CH       Total Minutes 8  -CH            User Key  (r) = Recorded By, (t) = Taken By, (c) = Cosigned By    Initials Name Provider Type     Christelle Gomes PT Physical Therapist              Therapy Charges for Today      Code Description Service Date Service Provider Modifiers Qty    01409590259  PT THERAPEUTIC ACT EA 15 MIN 3/27/2023 Christelle Gomes, PT GP 1    20489667346 HC PT EVAL MOD COMPLEXITY 2 3/27/2023 Christelle Gomes, PT GP 1          PT G-Codes  Outcome Measure Options: AM-PAC 6 Clicks Basic Mobility (PT)  AM-PAC 6 Clicks Score (PT): 16  PT Discharge Summary  Anticipated Discharge Disposition (PT): home, home with home health, skilled nursing facility (pending progress and home resources)    Christelle Gomes, PT  3/27/2023

## 2023-03-27 NOTE — PROGRESS NOTES
"[facility] Clinical Pharmacy Services: Vancomycin Monitoring Note    Tomy Manjarrez is a 73 y.o. male who is on day 6/10 of pharmacy to dose vancomycin for Skin and Soft Tissue.    Previous Vancomycin Dose:   1250 mg IV every  24  hours  Updated Cultures and Sensitivities: 3/24 Toe Cx: GPC, GNB  Results from last 7 days   Lab Units 03/25/23  1725 03/24/23  0527 03/23/23  0453   VANCOMYCIN RM mcg/mL  --  15.60 19.30   VANCOMYCIN TR mcg/mL 12.00  --   --      Vitals/Labs  Ht: 190.5 cm (75\"); Wt: 123 kg (271 lb 9.7 oz)   Temp Readings from Last 1 Encounters:   03/27/23 98.4 °F (36.9 °C) (Oral)     Estimated Creatinine Clearance: 43.4 mL/min (A) (by C-G formula based on SCr of 2.11 mg/dL (H)).     Results from last 7 days   Lab Units 03/27/23  0430 03/26/23  0455 03/25/23  0523 03/24/23  0527   CREATININE mg/dL 2.11* 2.30* 2.25* 2.53*   WBC 10*3/mm3  --  10.15 12.02* 15.29*     Assessment/Plan  Regimen extended. Scr improved, seemingly better than baseline. Current regimen predicted to be slightly subtherapeutic. Given the indication I will modestly increase the regimen.    Current Vancomycin Dose: increase to vancomycin 1500 mg IV every 24 hours; provides a predicted  mg/L.hr   Next Level Date and Time: 3/29 @1400  We will continue to monitor patient changes and renal function     Thank you for involving pharmacy in this patient's care. Please contact pharmacy with any questions or concerns.       Cheyenne Gowers, [facility]  Clinical Pharmacist  "

## 2023-03-28 ENCOUNTER — ANESTHESIA (OUTPATIENT)
Dept: PERIOP | Facility: HOSPITAL | Age: 73
DRG: 854 | End: 2023-03-28
Payer: MEDICARE

## 2023-03-28 ENCOUNTER — APPOINTMENT (OUTPATIENT)
Dept: GENERAL RADIOLOGY | Facility: HOSPITAL | Age: 73
DRG: 854 | End: 2023-03-28
Payer: MEDICARE

## 2023-03-28 ENCOUNTER — ANESTHESIA EVENT (OUTPATIENT)
Dept: PERIOP | Facility: HOSPITAL | Age: 73
DRG: 854 | End: 2023-03-28
Payer: MEDICARE

## 2023-03-28 LAB
ALBUMIN SERPL-MCNC: 3.1 G/DL (ref 3.5–5.2)
ALBUMIN/GLOB SERPL: 0.8 G/DL
ALP SERPL-CCNC: 157 U/L (ref 39–117)
ALT SERPL W P-5'-P-CCNC: 22 U/L (ref 1–41)
ANION GAP SERPL CALCULATED.3IONS-SCNC: 6.5 MMOL/L (ref 5–15)
AST SERPL-CCNC: 33 U/L (ref 1–40)
BACTERIA SPEC AEROBE CULT: ABNORMAL
BACTERIA SPEC AEROBE CULT: ABNORMAL
BILIRUB SERPL-MCNC: 0.6 MG/DL (ref 0–1.2)
BUN SERPL-MCNC: 30 MG/DL (ref 8–23)
BUN/CREAT SERPL: 13.9 (ref 7–25)
CALCIUM SPEC-SCNC: 9.4 MG/DL (ref 8.6–10.5)
CHLORIDE SERPL-SCNC: 105 MMOL/L (ref 98–107)
CO2 SERPL-SCNC: 26.5 MMOL/L (ref 22–29)
CREAT SERPL-MCNC: 2.16 MG/DL (ref 0.76–1.27)
DEPRECATED RDW RBC AUTO: 42 FL (ref 37–54)
EGFRCR SERPLBLD CKD-EPI 2021: 31.3 ML/MIN/1.73
ERYTHROCYTE [DISTWIDTH] IN BLOOD BY AUTOMATED COUNT: 13.6 % (ref 12.3–15.4)
FERRITIN SERPL-MCNC: 779 NG/ML (ref 30–400)
FOLATE SERPL-MCNC: 6.15 NG/ML (ref 4.78–24.2)
GLOBULIN UR ELPH-MCNC: 4.1 GM/DL
GLUCOSE BLDC GLUCOMTR-MCNC: 119 MG/DL (ref 70–130)
GLUCOSE BLDC GLUCOMTR-MCNC: 134 MG/DL (ref 70–130)
GLUCOSE BLDC GLUCOMTR-MCNC: 140 MG/DL (ref 70–130)
GLUCOSE BLDC GLUCOMTR-MCNC: 204 MG/DL (ref 70–130)
GLUCOSE SERPL-MCNC: 126 MG/DL (ref 65–99)
GRAM STN SPEC: ABNORMAL
GRAM STN SPEC: ABNORMAL
HBA1C MFR BLD: 7.8 % (ref 4.8–5.6)
HCT VFR BLD AUTO: 34 % (ref 37.5–51)
HGB BLD-MCNC: 11.3 G/DL (ref 13–17.7)
IRON 24H UR-MRATE: 35 MCG/DL (ref 59–158)
IRON SATN MFR SERPL: 12 % (ref 20–50)
MAGNESIUM SERPL-MCNC: 1.8 MG/DL (ref 1.6–2.4)
MCH RBC QN AUTO: 28.3 PG (ref 26.6–33)
MCHC RBC AUTO-ENTMCNC: 33.2 G/DL (ref 31.5–35.7)
MCV RBC AUTO: 85 FL (ref 79–97)
PLATELET # BLD AUTO: 259 10*3/MM3 (ref 140–450)
PMV BLD AUTO: 12.4 FL (ref 6–12)
POTASSIUM SERPL-SCNC: 4.5 MMOL/L (ref 3.5–5.2)
PROT SERPL-MCNC: 7.2 G/DL (ref 6–8.5)
RBC # BLD AUTO: 4 10*6/MM3 (ref 4.14–5.8)
SODIUM SERPL-SCNC: 138 MMOL/L (ref 136–145)
TIBC SERPL-MCNC: 299 MCG/DL (ref 298–536)
TRANSFERRIN SERPL-MCNC: 201 MG/DL (ref 200–360)
VIT B12 BLD-MCNC: 1079 PG/ML (ref 211–946)
WBC NRBC COR # BLD: 10.8 10*3/MM3 (ref 3.4–10.8)

## 2023-03-28 PROCEDURE — 94799 UNLISTED PULMONARY SVC/PX: CPT

## 2023-03-28 PROCEDURE — 047N3DZ DILATION OF LEFT POPLITEAL ARTERY WITH INTRALUMINAL DEVICE, PERCUTANEOUS APPROACH: ICD-10-PCS | Performed by: SURGERY

## 2023-03-28 PROCEDURE — 83036 HEMOGLOBIN GLYCOSYLATED A1C: CPT | Performed by: HOSPITALIST

## 2023-03-28 PROCEDURE — C1887 CATHETER, GUIDING: HCPCS | Performed by: SURGERY

## 2023-03-28 PROCEDURE — 83735 ASSAY OF MAGNESIUM: CPT | Performed by: HOSPITALIST

## 2023-03-28 PROCEDURE — 0KBW3ZZ EXCISION OF LEFT FOOT MUSCLE, PERCUTANEOUS APPROACH: ICD-10-PCS | Performed by: SURGERY

## 2023-03-28 PROCEDURE — 85027 COMPLETE CBC AUTOMATED: CPT | Performed by: HOSPITALIST

## 2023-03-28 PROCEDURE — 25010000002 PROPOFOL 10 MG/ML EMULSION: Performed by: NURSE ANESTHETIST, CERTIFIED REGISTERED

## 2023-03-28 PROCEDURE — C1769 GUIDE WIRE: HCPCS | Performed by: SURGERY

## 2023-03-28 PROCEDURE — 25010000002 FENTANYL CITRATE (PF) 50 MCG/ML SOLUTION: Performed by: NURSE ANESTHETIST, CERTIFIED REGISTERED

## 2023-03-28 PROCEDURE — 25010000002 HEPARIN (PORCINE) PER 1000 UNITS: Performed by: NURSE ANESTHETIST, CERTIFIED REGISTERED

## 2023-03-28 PROCEDURE — C2623 CATH, TRANSLUMIN, DRUG-COAT: HCPCS | Performed by: SURGERY

## 2023-03-28 PROCEDURE — C1894 INTRO/SHEATH, NON-LASER: HCPCS | Performed by: SURGERY

## 2023-03-28 PROCEDURE — 94761 N-INVAS EAR/PLS OXIMETRY MLT: CPT

## 2023-03-28 PROCEDURE — 25010000002 HYDROMORPHONE PER 4 MG: Performed by: NURSE ANESTHETIST, CERTIFIED REGISTERED

## 2023-03-28 PROCEDURE — B4101ZZ FLUOROSCOPY OF ABDOMINAL AORTA USING LOW OSMOLAR CONTRAST: ICD-10-PCS | Performed by: SURGERY

## 2023-03-28 PROCEDURE — C1725 CATH, TRANSLUMIN NON-LASER: HCPCS | Performed by: SURGERY

## 2023-03-28 PROCEDURE — B41G1ZZ FLUOROSCOPY OF LEFT LOWER EXTREMITY ARTERIES USING LOW OSMOLAR CONTRAST: ICD-10-PCS | Performed by: SURGERY

## 2023-03-28 PROCEDURE — 047Q3ZZ DILATION OF LEFT ANTERIOR TIBIAL ARTERY, PERCUTANEOUS APPROACH: ICD-10-PCS | Performed by: SURGERY

## 2023-03-28 PROCEDURE — 82962 GLUCOSE BLOOD TEST: CPT

## 2023-03-28 PROCEDURE — 82746 ASSAY OF FOLIC ACID SERUM: CPT | Performed by: HOSPITALIST

## 2023-03-28 PROCEDURE — 25010000002 HEPARIN (PORCINE) PER 1000 UNITS: Performed by: SURGERY

## 2023-03-28 PROCEDURE — 82607 VITAMIN B-12: CPT | Performed by: HOSPITALIST

## 2023-03-28 PROCEDURE — 84466 ASSAY OF TRANSFERRIN: CPT | Performed by: HOSPITALIST

## 2023-03-28 PROCEDURE — 82728 ASSAY OF FERRITIN: CPT | Performed by: HOSPITALIST

## 2023-03-28 PROCEDURE — 83540 ASSAY OF IRON: CPT | Performed by: HOSPITALIST

## 2023-03-28 PROCEDURE — C1760 CLOSURE DEV, VASC: HCPCS | Performed by: SURGERY

## 2023-03-28 PROCEDURE — 80053 COMPREHEN METABOLIC PANEL: CPT | Performed by: HOSPITALIST

## 2023-03-28 PROCEDURE — 99232 SBSQ HOSP IP/OBS MODERATE 35: CPT | Performed by: STUDENT IN AN ORGANIZED HEALTH CARE EDUCATION/TRAINING PROGRAM

## 2023-03-28 PROCEDURE — 25510000001 IOPAMIDOL PER 1 ML: Performed by: HOSPITALIST

## 2023-03-28 PROCEDURE — 25010000002 PIPERACILLIN SOD-TAZOBACTAM PER 1 G: Performed by: SURGERY

## 2023-03-28 RX ORDER — IPRATROPIUM BROMIDE AND ALBUTEROL SULFATE 2.5; .5 MG/3ML; MG/3ML
3 SOLUTION RESPIRATORY (INHALATION)
Status: DISPENSED | OUTPATIENT
Start: 2023-03-28 | End: 2023-03-30

## 2023-03-28 RX ORDER — FENTANYL CITRATE 50 UG/ML
25 INJECTION, SOLUTION INTRAMUSCULAR; INTRAVENOUS
Status: DISCONTINUED | OUTPATIENT
Start: 2023-03-28 | End: 2023-03-28 | Stop reason: HOSPADM

## 2023-03-28 RX ORDER — NALOXONE HCL 0.4 MG/ML
0.2 VIAL (ML) INJECTION AS NEEDED
Status: DISCONTINUED | OUTPATIENT
Start: 2023-03-28 | End: 2023-03-28 | Stop reason: HOSPADM

## 2023-03-28 RX ORDER — HYDROMORPHONE HYDROCHLORIDE 1 MG/ML
0.25 INJECTION, SOLUTION INTRAMUSCULAR; INTRAVENOUS; SUBCUTANEOUS
Status: DISCONTINUED | OUTPATIENT
Start: 2023-03-28 | End: 2023-03-28 | Stop reason: HOSPADM

## 2023-03-28 RX ORDER — SODIUM CHLORIDE 0.9 % (FLUSH) 0.9 %
3-10 SYRINGE (ML) INJECTION AS NEEDED
Status: DISCONTINUED | OUTPATIENT
Start: 2023-03-28 | End: 2023-03-28 | Stop reason: HOSPADM

## 2023-03-28 RX ORDER — IPRATROPIUM BROMIDE AND ALBUTEROL SULFATE 2.5; .5 MG/3ML; MG/3ML
3 SOLUTION RESPIRATORY (INHALATION) ONCE AS NEEDED
Status: DISCONTINUED | OUTPATIENT
Start: 2023-03-28 | End: 2023-03-28 | Stop reason: HOSPADM

## 2023-03-28 RX ORDER — FLUMAZENIL 0.1 MG/ML
0.2 INJECTION INTRAVENOUS AS NEEDED
Status: DISCONTINUED | OUTPATIENT
Start: 2023-03-28 | End: 2023-03-28 | Stop reason: HOSPADM

## 2023-03-28 RX ORDER — LIDOCAINE HYDROCHLORIDE 20 MG/ML
INJECTION, SOLUTION INTRAVENOUS AS NEEDED
Status: DISCONTINUED | OUTPATIENT
Start: 2023-03-28 | End: 2023-03-28 | Stop reason: SURG

## 2023-03-28 RX ORDER — DROPERIDOL 2.5 MG/ML
0.62 INJECTION, SOLUTION INTRAMUSCULAR; INTRAVENOUS
Status: DISCONTINUED | OUTPATIENT
Start: 2023-03-28 | End: 2023-03-28 | Stop reason: HOSPADM

## 2023-03-28 RX ORDER — ENOXAPARIN SODIUM 100 MG/ML
40 INJECTION SUBCUTANEOUS DAILY
Status: DISCONTINUED | OUTPATIENT
Start: 2023-03-29 | End: 2023-03-31 | Stop reason: HOSPADM

## 2023-03-28 RX ORDER — PROPOFOL 10 MG/ML
VIAL (ML) INTRAVENOUS AS NEEDED
Status: DISCONTINUED | OUTPATIENT
Start: 2023-03-28 | End: 2023-03-28 | Stop reason: SURG

## 2023-03-28 RX ORDER — SODIUM CHLORIDE 9 MG/ML
9 INJECTION, SOLUTION INTRAVENOUS CONTINUOUS PRN
Status: DISCONTINUED | OUTPATIENT
Start: 2023-03-28 | End: 2023-03-31 | Stop reason: HOSPADM

## 2023-03-28 RX ORDER — HYDRALAZINE HYDROCHLORIDE 20 MG/ML
5 INJECTION INTRAMUSCULAR; INTRAVENOUS
Status: DISCONTINUED | OUTPATIENT
Start: 2023-03-28 | End: 2023-03-28 | Stop reason: HOSPADM

## 2023-03-28 RX ORDER — HYDROCODONE BITARTRATE AND ACETAMINOPHEN 7.5; 325 MG/1; MG/1
1 TABLET ORAL EVERY 4 HOURS PRN
Status: DISCONTINUED | OUTPATIENT
Start: 2023-03-28 | End: 2023-03-31 | Stop reason: HOSPADM

## 2023-03-28 RX ORDER — EPHEDRINE SULFATE 50 MG/ML
INJECTION INTRAVENOUS AS NEEDED
Status: DISCONTINUED | OUTPATIENT
Start: 2023-03-28 | End: 2023-03-28 | Stop reason: SURG

## 2023-03-28 RX ORDER — PROMETHAZINE HYDROCHLORIDE 25 MG/1
25 TABLET ORAL ONCE AS NEEDED
Status: DISCONTINUED | OUTPATIENT
Start: 2023-03-28 | End: 2023-03-28 | Stop reason: HOSPADM

## 2023-03-28 RX ORDER — LIDOCAINE HYDROCHLORIDE 10 MG/ML
0.5 INJECTION, SOLUTION EPIDURAL; INFILTRATION; INTRACAUDAL; PERINEURAL ONCE AS NEEDED
Status: DISCONTINUED | OUTPATIENT
Start: 2023-03-28 | End: 2023-03-28 | Stop reason: HOSPADM

## 2023-03-28 RX ORDER — FAMOTIDINE 10 MG/ML
20 INJECTION, SOLUTION INTRAVENOUS ONCE
Status: COMPLETED | OUTPATIENT
Start: 2023-03-28 | End: 2023-03-28

## 2023-03-28 RX ORDER — ONDANSETRON 4 MG/1
4 TABLET, FILM COATED ORAL EVERY 6 HOURS PRN
Status: DISCONTINUED | OUTPATIENT
Start: 2023-03-28 | End: 2023-03-31 | Stop reason: HOSPADM

## 2023-03-28 RX ORDER — CEFAZOLIN SODIUM IN 0.9 % NACL 3 G/100 ML
3 INTRAVENOUS SOLUTION, PIGGYBACK (ML) INTRAVENOUS ONCE
Status: DISCONTINUED | OUTPATIENT
Start: 2023-03-28 | End: 2023-03-28

## 2023-03-28 RX ORDER — HYDROCODONE BITARTRATE AND ACETAMINOPHEN 5; 325 MG/1; MG/1
1 TABLET ORAL ONCE AS NEEDED
Status: DISCONTINUED | OUTPATIENT
Start: 2023-03-28 | End: 2023-03-28 | Stop reason: HOSPADM

## 2023-03-28 RX ORDER — PROMETHAZINE HYDROCHLORIDE 25 MG/1
25 SUPPOSITORY RECTAL ONCE AS NEEDED
Status: DISCONTINUED | OUTPATIENT
Start: 2023-03-28 | End: 2023-03-28 | Stop reason: HOSPADM

## 2023-03-28 RX ORDER — SODIUM CHLORIDE 0.9 % (FLUSH) 0.9 %
3 SYRINGE (ML) INJECTION EVERY 12 HOURS SCHEDULED
Status: DISCONTINUED | OUTPATIENT
Start: 2023-03-28 | End: 2023-03-28 | Stop reason: HOSPADM

## 2023-03-28 RX ORDER — ONDANSETRON 2 MG/ML
4 INJECTION INTRAMUSCULAR; INTRAVENOUS EVERY 6 HOURS PRN
Status: DISCONTINUED | OUTPATIENT
Start: 2023-03-28 | End: 2023-03-31 | Stop reason: HOSPADM

## 2023-03-28 RX ORDER — HYDROCODONE BITARTRATE AND ACETAMINOPHEN 7.5; 325 MG/1; MG/1
1 TABLET ORAL EVERY 4 HOURS PRN
Status: DISCONTINUED | OUTPATIENT
Start: 2023-03-28 | End: 2023-03-28 | Stop reason: HOSPADM

## 2023-03-28 RX ORDER — NITROGLYCERIN 0.4 MG/1
0.4 TABLET SUBLINGUAL
Status: DISCONTINUED | OUTPATIENT
Start: 2023-03-28 | End: 2023-03-31 | Stop reason: HOSPADM

## 2023-03-28 RX ORDER — FENTANYL CITRATE 50 UG/ML
INJECTION, SOLUTION INTRAMUSCULAR; INTRAVENOUS AS NEEDED
Status: DISCONTINUED | OUTPATIENT
Start: 2023-03-28 | End: 2023-03-28 | Stop reason: SURG

## 2023-03-28 RX ORDER — HEPARIN SODIUM 1000 [USP'U]/ML
INJECTION, SOLUTION INTRAVENOUS; SUBCUTANEOUS AS NEEDED
Status: DISCONTINUED | OUTPATIENT
Start: 2023-03-28 | End: 2023-03-28 | Stop reason: SURG

## 2023-03-28 RX ORDER — FERROUS SULFATE 325(65) MG
325 TABLET ORAL
Status: DISCONTINUED | OUTPATIENT
Start: 2023-03-29 | End: 2023-03-31 | Stop reason: HOSPADM

## 2023-03-28 RX ORDER — ONDANSETRON 2 MG/ML
4 INJECTION INTRAMUSCULAR; INTRAVENOUS ONCE AS NEEDED
Status: DISCONTINUED | OUTPATIENT
Start: 2023-03-28 | End: 2023-03-28 | Stop reason: HOSPADM

## 2023-03-28 RX ORDER — EPHEDRINE SULFATE 50 MG/ML
5 INJECTION, SOLUTION INTRAVENOUS ONCE AS NEEDED
Status: DISCONTINUED | OUTPATIENT
Start: 2023-03-28 | End: 2023-03-28 | Stop reason: HOSPADM

## 2023-03-28 RX ORDER — DIPHENHYDRAMINE HYDROCHLORIDE 50 MG/ML
12.5 INJECTION INTRAMUSCULAR; INTRAVENOUS
Status: DISCONTINUED | OUTPATIENT
Start: 2023-03-28 | End: 2023-03-28 | Stop reason: HOSPADM

## 2023-03-28 RX ORDER — LABETALOL HYDROCHLORIDE 5 MG/ML
5 INJECTION, SOLUTION INTRAVENOUS
Status: DISCONTINUED | OUTPATIENT
Start: 2023-03-28 | End: 2023-03-28 | Stop reason: HOSPADM

## 2023-03-28 RX ORDER — VASOPRESSIN 20 U/ML
INJECTION PARENTERAL AS NEEDED
Status: DISCONTINUED | OUTPATIENT
Start: 2023-03-28 | End: 2023-03-28 | Stop reason: SURG

## 2023-03-28 RX ADMIN — INSULIN GLARGINE-YFGN 30 UNITS: 100 INJECTION, SOLUTION SUBCUTANEOUS at 21:29

## 2023-03-28 RX ADMIN — EPHEDRINE SULFATE 10 MG: 50 INJECTION INTRAVENOUS at 13:21

## 2023-03-28 RX ADMIN — PROPOFOL 200 MG: 10 INJECTION, EMULSION INTRAVENOUS at 13:10

## 2023-03-28 RX ADMIN — AMLODIPINE BESYLATE 10 MG: 10 TABLET ORAL at 06:38

## 2023-03-28 RX ADMIN — IPRATROPIUM BROMIDE AND ALBUTEROL SULFATE 3 ML: .5; 2.5 SOLUTION RESPIRATORY (INHALATION) at 22:56

## 2023-03-28 RX ADMIN — ATORVASTATIN CALCIUM 40 MG: 20 TABLET, FILM COATED ORAL at 21:30

## 2023-03-28 RX ADMIN — SODIUM CHLORIDE 100 ML/HR: 9 INJECTION, SOLUTION INTRAVENOUS at 02:57

## 2023-03-28 RX ADMIN — PANTOPRAZOLE SODIUM 40 MG: 40 TABLET, DELAYED RELEASE ORAL at 06:38

## 2023-03-28 RX ADMIN — TRAZODONE HYDROCHLORIDE 100 MG: 100 TABLET ORAL at 21:31

## 2023-03-28 RX ADMIN — HYDROMORPHONE HYDROCHLORIDE 0.25 MG: 1 INJECTION, SOLUTION INTRAMUSCULAR; INTRAVENOUS; SUBCUTANEOUS at 16:18

## 2023-03-28 RX ADMIN — LEVOFLOXACIN 750 MG: 500 TABLET, FILM COATED ORAL at 18:44

## 2023-03-28 RX ADMIN — TIZANIDINE 4 MG: 4 TABLET ORAL at 21:29

## 2023-03-28 RX ADMIN — TAZOBACTAM SODIUM AND PIPERACILLIN SODIUM 3.38 G: 375; 3 INJECTION, SOLUTION INTRAVENOUS at 00:15

## 2023-03-28 RX ADMIN — LIDOCAINE HYDROCHLORIDE 100 MG: 20 INJECTION, SOLUTION INTRAVENOUS at 13:10

## 2023-03-28 RX ADMIN — FAMOTIDINE 20 MG: 10 INJECTION, SOLUTION INTRAVENOUS at 10:16

## 2023-03-28 RX ADMIN — NEBIVOLOL 10 MG: 10 TABLET ORAL at 06:39

## 2023-03-28 RX ADMIN — VASOPRESSIN 1 UNITS: 20 INJECTION INTRAVENOUS at 13:31

## 2023-03-28 RX ADMIN — HYDROCODONE BITARTRATE AND ACETAMINOPHEN 1 TABLET: 7.5; 325 TABLET ORAL at 18:44

## 2023-03-28 RX ADMIN — VASOPRESSIN 1 UNITS: 20 INJECTION INTRAVENOUS at 14:11

## 2023-03-28 RX ADMIN — HYDROCODONE BITARTRATE AND ACETAMINOPHEN 1 TABLET: 7.5; 325 TABLET ORAL at 22:49

## 2023-03-28 RX ADMIN — SODIUM CHLORIDE, POTASSIUM CHLORIDE, SODIUM LACTATE AND CALCIUM CHLORIDE 9 ML/HR: 600; 310; 30; 20 INJECTION, SOLUTION INTRAVENOUS at 10:17

## 2023-03-28 RX ADMIN — FENTANYL CITRATE 25 MCG: 50 INJECTION, SOLUTION INTRAMUSCULAR; INTRAVENOUS at 15:15

## 2023-03-28 RX ADMIN — TAZOBACTAM SODIUM AND PIPERACILLIN SODIUM 3.38 G: 375; 3 INJECTION, SOLUTION INTRAVENOUS at 10:09

## 2023-03-28 RX ADMIN — FENTANYL CITRATE 25 MCG: 50 INJECTION, SOLUTION INTRAMUSCULAR; INTRAVENOUS at 15:40

## 2023-03-28 RX ADMIN — HEPARIN SODIUM 3000 UNITS: 1000 INJECTION INTRAVENOUS; SUBCUTANEOUS at 13:32

## 2023-03-28 RX ADMIN — EPHEDRINE SULFATE 10 MG: 50 INJECTION INTRAVENOUS at 13:26

## 2023-03-28 RX ADMIN — HEPARIN SODIUM 2000 UNITS: 1000 INJECTION INTRAVENOUS; SUBCUTANEOUS at 14:11

## 2023-03-28 RX ADMIN — AMITRIPTYLINE HYDROCHLORIDE 25 MG: 25 TABLET, FILM COATED ORAL at 21:29

## 2023-03-28 RX ADMIN — FENTANYL CITRATE 50 MCG: 50 INJECTION, SOLUTION INTRAMUSCULAR; INTRAVENOUS at 14:30

## 2023-03-28 RX ADMIN — VASOPRESSIN 1 UNITS: 20 INJECTION INTRAVENOUS at 13:42

## 2023-03-28 RX ADMIN — IOPAMIDOL 120 ML: 510 INJECTION, SOLUTION INTRAVASCULAR at 14:56

## 2023-03-28 RX ADMIN — SODIUM CHLORIDE 100 ML/HR: 9 INJECTION, SOLUTION INTRAVENOUS at 22:50

## 2023-03-28 RX ADMIN — Medication 10 ML: at 21:31

## 2023-03-28 NOTE — SIGNIFICANT NOTE
03/28/23 1257   OTHER   Discipline physical therapy assistant   Rehab Time/Intention   Session Not Performed patient unavailable for treatment  (pt off floor for procedure; PT will f/u tomorrow)   Recommendation   PT - Next Appointment 03/29/23

## 2023-03-28 NOTE — ANESTHESIA POSTPROCEDURE EVALUATION
Patient: Tomy Manjarrez    Procedure Summary     Date: 03/28/23 [facility]    Anesthesia Start: 1302 Anesthesia Stop: 1458    Procedure: LEFT LEG ARTERIOGRAM  POSSIBLE TIBIAL INTERVENTION (Left: Thigh) Diagnosis:     Surgeons: Baltazar Oh MD Provider: Zakia Monreal MD    Anesthesia Type: general ASA Status: 3          Anesthesia Type: general    Vitals  Vitals Value Taken Time   /50 03/28/23 1520   Temp 37 °C (98.6 °F) 03/28/23 1452   Pulse 65 03/28/23 1525   Resp 16 03/28/23 1520   SpO2 94 % 03/28/23 1525   Vitals shown include unvalidated device data.        Post Anesthesia Care and Evaluation    Patient location during evaluation: bedside  Patient participation: complete - patient participated  Level of consciousness: awake  Pain management: adequate    Airway patency: patent  Anesthetic complications: No anesthetic complications    Cardiovascular status: acceptable  Respiratory status: acceptable  Hydration status: acceptable

## 2023-03-28 NOTE — PROGRESS NOTES
FootName: Tomy Manjarrez ADMIT: 3/22/2023   : 1950  PCP: Zoya Claros PA    MRN: [id number] LOS: 6 days   AGE/SEX: 73 y.o. male  ROOM: [address]    [place]    Chief Complaint   Patient presents with   • Foot Pain   • Numbness     CC: Foot I&D follow-up peripheral arterial disease follow-up  Subjective     73 y.o. male patient resting comfortably.  Moderate discomfort    Review of Systems some pain of the foot    Objective     Scheduled Medications:   allopurinol, 100 mg, Oral, Q PM  amitriptyline, 25 mg, Oral, Nightly  amLODIPine, 10 mg, Oral, QAM  atorvastatin, 40 mg, Oral, Nightly  DULoxetine, 60 mg, Oral, Daily  enoxaparin, 40 mg, Subcutaneous, Daily  fenofibrate, 145 mg, Oral, Daily  ferrous sulfate, 325 mg, Oral, Daily With Breakfast  finasteride, 5 mg, Oral, Daily  losartan, 100 mg, Oral, Q24H   And  hydroCHLOROthiazide, 25 mg, Oral, Q24H  insulin glargine, 30 Units, Subcutaneous, Nightly  insulin lispro, 0-14 Units, Subcutaneous, TID AC  nebivolol, 10 mg, Oral, QAM  pantoprazole, 40 mg, Oral, Q AM  piperacillin-tazobactam, 3.375 g, Intravenous, Q8H  pregabalin, 200 mg, Oral, Daily  sodium chloride, 10 mL, Intravenous, Q12H  tamsulosin, 0.4 mg, Oral, Daily  tiZANidine, 4 mg, Oral, Nightly  traZODone, 100 mg, Oral, Nightly  vancomycin, 1,500 mg, Intravenous, Q24H        Active Infusions:  lactated ringers, 9 mL/hr  Pharmacy to dose vancomycin,   sodium chloride, 100 mL/hr, Last Rate: 100 mL/hr (23 0257)        As Needed Medications:  •  acetaminophen  •  dextrose  •  dextrose  •  glucagon (human recombinant)  •  HYDROcodone-acetaminophen  •  HYDROmorphone **AND** naloxone  •  nitroglycerin  •  ondansetron **OR** ondansetron  •  Pharmacy to dose vancomycin  •  sodium chloride  •  sodium chloride  •  sodium hypochlorite (DAKIN'S  Strength) 0.0625% topical solution (VERAFLO)    Vital Signs  Vital Signs   Patient Vitals for the past 24 hrs:   BP  Temp Temp src Pulse Resp SpO2 Weight   03/28/23 0712 154/82 99.1 °F (37.3 °C) Oral 70 18 98 % --   03/28/23 0638 157/70 -- -- 71 -- -- --   03/28/23 0511 -- -- -- -- -- -- 127 kg (279 lb 8.7 oz)   03/27/23 2321 146/75 98.5 °F (36.9 °C) Oral -- 16 -- --   03/27/23 1930 152/68 -- -- 66 18 95 % --   03/27/23 1437 115/54 97.8 °F (36.6 °C) Oral 62 18 97 % --   03/27/23 1045 134/62 98.4 °F (36.9 °C) Oral 62 16 94 % --   03/27/23 0821 143/71 98.7 °F (37.1 °C) Oral 60 14 97 % --     I/O:  I/O last 3 completed shifts:  In: 4069 [P.O.:1320; I.V.:2449; IV Piggyback:300]  Out: 7050 [Urine:7050]    Physical Exam:  Physical Exam   Foot wound with viable tissue but some pus still expressible.    Results Review:     CBC    Results from last 7 days   Lab Units 03/28/23 0457 03/26/23 0455 03/25/23 0523 03/24/23  0527 03/23/23  0453 03/22/23  1830   WBC 10*3/mm3 10.80 10.15 12.02* 15.29* 15.23* 15.53*   HEMOGLOBIN g/dL 11.3* 10.6* 10.9* 10.0* 11.0* 11.5*   PLATELETS 10*3/mm3 259 214 193 167 166 176     BMP   Results from last 7 days   Lab Units 03/28/23 0457 03/27/23  0430 03/26/23 0455 03/25/23 0523 03/24/23  0527 03/23/23  0453 03/22/23  2335 03/22/23  1830   SODIUM mmol/L 138  --  135* 137 137 134* 134* 131*   POTASSIUM mmol/L 4.5  --  4.1 4.3 4.0 4.1 4.2 4.4   CHLORIDE mmol/L 105  --  105 104 104 100 100 96*   CO2 mmol/L 26.5  --  22.0 22.0 23.0 25.0 20.8* 23.0   BUN mg/dL 30*  --  47* 43* 44* 46* 48* 49*   CREATININE mg/dL 2.16* 2.11* 2.30* 2.25* 2.53* 2.72* 2.96* 3.12*   GLUCOSE mg/dL 126*  --  176* 235* 193* 196* 218* 219*   MAGNESIUM mg/dL 1.8  --   --   --   --   --   --   --        Assessment & Plan     Assessment & Plan      Cellulitis of left foot    Type 2 diabetes mellitus with hyperglycemia, with long-term current use of insulin (HCC)    Hypertension    Essential hypertension    Stage 3b chronic kidney disease (HCC)    BPH with urinary obstruction    Clear cell carcinoma of kidney (HCC)    Hyponatremia    MARTIN  (acute kidney injury) (HCC)    Diarrhea        73 y.o. male left diabetic foot infection with mal perforans ulcer.  Postop day 4 status post I&D of foot wound.  Plans for heart today.  Any improvement in circulation is warranted given his wound to heal.  We will also examine wound to determine if anything else needs I&D while he is in the OR.  He is aware and agreeable.        Baltazar Oh MD  03/28/23  08:14 EDT    Please call my office with any question: [phone]    Active Hospital Problems    Diagnosis  POA   • **Cellulitis of left foot [L03.116]  Yes   • MARTIN (acute kidney injury) (HCC) [N17.9]  Yes   • Diarrhea [R19.7]  No   • Hyponatremia [E87.1]  Yes   • 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

## 2023-03-28 NOTE — PLAN OF CARE
Goal Outcome Evaluation:      VSS. A/o x4. Npo since MN for L arteriogram w possible intervention/possible debridement of L foot. L foot wound vac cdi. Up to bsc x1-2 assist, no BM. F/c in place. IVF and IV abx.     Progress: no change

## 2023-03-28 NOTE — PROGRESS NOTES
LOS: 6 days     Chief Complaint: Diabetic foot infection    Interval History: Patient doing very well this morning.  States he has no foot pain.  Going to the OR later for angiogram and possible I&D of the foot wound.    Vital Signs  Temp:  [97.8 °F (36.6 °C)-99.1 °F (37.3 °C)] 99.1 °F (37.3 °C)  Heart Rate:  [62-71] 70  Resp:  [16-18] 18  BP: (115-157)/(54-82) 154/82    Physical Exam:  General: In no acute distress  HEENT: Oropharynx clear, moist mucous membranes  Cardiovascular: RRR  Respiratory: Normal work of breathing  GI: Soft, NT/ND  Extremities: Improving edema and erythema of the foot.  Wound VAC in place.  Access: Peripheral IV.    Antibiotics:  Anti-Infectives (From admission, onward)    Ordered     Dose/Rate Route Frequency Start Stop    03/28/23 0912  ceFAZolin in Sodium Chloride (ANCEF) IVPB solution 3 g        Ordering Provider: Baltazar Oh MD    3 g  200 mL/hr over 30 Minutes Intravenous Once 03/28/23 1000      03/27/23 0938  vancomycin 1500 mg/500 mL 0.9% NS IVPB ([facility])        Ordering Provider: Baltazar Oh MD    1,500 mg  over 90 Minutes Intravenous Every 24 Hours 03/27/23 1500 04/01/23 1459    03/27/23 0806  Pharmacy to dose vancomycin        Ordering Provider: Baltazar Oh MD     Does not apply Continuous PRN 03/27/23 0806 04/01/23 0805    03/25/23 1845  vancomycin 1250 mg/250 mL 0.9% NS IVPB ([facility])        Ordering Provider: Noah Pritchard MD    1,250 mg  over 60 Minutes Intravenous Every 24 Hours 03/26/23 0200 03/27/23 0306    03/23/23 0835  vancomycin (VANCOCIN) 1000 mg/200 mL dextrose 5% IVPB        Ordering Provider: Anuradha Desouza APRN    1,000 mg  over 60 Minutes Intravenous Once 03/23/23 1030 03/23/23 1332    03/22/23 2315  piperacillin-tazobactam (ZOSYN) 3.375 g in iso-osmotic dextrose 50 ml (premix)        Ordering Provider: Baltazar Oh MD    3.375 g  over 4 Hours Intravenous Every 8 Hours 03/23/23 0030 04/01/23 0805    03/22/23 1744   piperacillin-tazobactam (ZOSYN) 4.5 g in iso-osmotic dextrose 100 mL IVPB (premix)        Ordering Provider: Richard Felipe II, MD    4.5 g  over 30 Minutes Intravenous Once 03/22/23 1746 03/22/23 1910 03/22/23 1741  vancomycin 2250 mg/500 mL 0.9% NS IVPB ([facility])        Ordering Provider: Richard Felipe II, MD    20 mg/kg × 113 kg Intravenous Once 03/22/23 1743 03/22/23 2238           Results Review:     I reviewed the patient's new clinical results.    Lab Results   Component Value Date    WBC 10.80 03/28/2023    HGB 11.3 (L) 03/28/2023    HCT 34.0 (L) 03/28/2023    MCV 85.0 03/28/2023     03/28/2023     Lab Results   Component Value Date    GLUCOSE 126 (H) 03/28/2023    BUN 30 (H) 03/28/2023    CREATININE 2.16 (H) 03/28/2023    EGFRIFNONA 29 (L) 09/10/2021    EGFRIFAFRI 32 (L) 01/18/2022    BCR 13.9 03/28/2023    CO2 26.5 03/28/2023    CALCIUM 9.4 03/28/2023    ALBUMIN 3.1 (L) 03/28/2023    LABIL2 1.1 07/01/2020    AST 33 03/28/2023    ALT 22 03/28/2023       Microbiology:  3/22 blood cultures no growth to date  3/24 OR cultures strep anginosus and E. coli    Assessment    #Left diabetic foot infection status post irrigation and debridement on 3/24  #Type 2 diabetes  #MARTIN on CKD  #Renal clear-cell carcinoma     Plan to stop vancomycin and Zosyn.  Transition today to p.o. levofloxacin 750 mg every 48 hours to cover isolated organisms from the OR.  We will plan for 6 weeks of therapy through 5/5 for osteomyelitis given the proximity of the infection to bone seen in the OR.      ID will follow.

## 2023-03-28 NOTE — BRIEF OP NOTE
ARTERIOGRAM LOWER EXTREMITY  Progress Note    Tomy Manjarrez  3/28/2023    Pre-op Diagnosis:   Diabetic foot infection with peripheral vascular disease       Post-Op Diagnosis Codes:  same    Procedure/CPT® Codes:        Procedure(s):  LEFT LEG ARTERIOGRAM  POSSIBLE TIBIAL INTERVENTION        Surgeon(s):  Baltazar Oh MD    Anesthesia: Monitored Anesthesia Care    Staff:   Circulator: Natty Ahumada RN; Jessica Nevarez RN  Scrub Person: Radha Guzman  Assistant: Fátima Tellez CSA  Vascular Radiology Technician: Kaylin Ocampo; Vidal Tony  Assistant: Fátima Tellez CSA      Estimated Blood Loss: minimal    Urine Voided: * No values recorded between 3/28/2023  1:01 PM and 3/28/2023  2:50 PM *    Specimens:                None          Drains:   Urethral Catheter Straight-tip (Active)   Daily Indications Acute Urinary Retention 03/28/23 0800   Site Assessment Clean;Skin intact 03/28/23 0941   Collection Container Standard drainage bag 03/28/23 0941   Securement Method Securing device 03/28/23 0941   Catheter care complete Yes 03/28/23 0800   Output (mL) 950 mL 03/28/23 1258       Findings: See dictation        Complications: None    Assistant: Fátima Tellez CSA  was responsible for performing the following activities: Wire and catheter management wire and catheter management and their skilled assistance was necessary for the success of this case.    Baltazar Oh MD     Date: 3/28/2023  Time: 15:05 EDT

## 2023-03-28 NOTE — OP NOTE
Operative Note  Date of Admission:  3/22/2023  OR Date: 3/28/2023    Pre-op Diagnosis:   Peripheral vascular disease with diabetic foot infection to the left foot with gangrene    Post-Op Diagnosis Codes:   same    Procedure:   1) duplex directed access, aortoiliofemoral arteriogram with left leg runoff, left anterior tibial/dorsalis pedis angioplasty, left popliteal drug-eluting balloon angioplasty x2, Perclose closure, left foot debridement skin and subcutaneous tissue and muscle Sharp excisional debridement 4 x 6 x 3 cm    Surgeon: Ruel Oh MD    Assistant: Fátima EASTMNA and they provided critical assistance during the case including suctioning, exposure, retraction, and reduction of blood loss.    Anesthesia: Monitored Anesthesia Care    Staff:   Circulator: Natty Ahumada RN; Jessica Nevarez RN  Scrub Person: Radha Guzman  Assistant: Fátima Tellez CSA  Vascular Radiology Technician: Kaylin Ocampo; Vidal Tony    Estimated Blood Loss: minimal    Specimens: * No orders in the log *     Complications: None    Findings: See dictation    Indications:    The patient is an 73 y.o. male seen for evaluation gangrene to the left foot involving the webspace of the first and second toe but not apparently involving bone directly.  Concerns for his perfusion were raised with arterial testing showing mild to moderate occlusive disease but because of the nature and a picture of his wound we felt that revascularization attempt was warranted to try to give him every benefit of healing as possible.  Patient understands risk benefits complications and agrees to procedure       Procedure:    Patient was prepped and draped sterilely.  Using duplex direction we accessed the right common femoral artery passing a wire centrally.  With the wire tract a pigtail was positioned to the level of the renal arteries where aortogram was performed pigtail was then pulled down to the aortoiliofemoral  level regular iliofemoral arteriogram in the oblique was performed we then selected over the horn with an RIN catheter and performed left leg runoff.  Finding disease at the popliteal and at the anterior tibial dorsalis Stewart pedis junction we placed a 6 Burmese sheath over the horn and used an 018 wire manipulated down past the diseased segment of the distal anterior tibial dorsalis pedis.  We then angioplastied this segment with a 2 mm x 4 mm balloon with mild improvement.  Following this a 4 mm x 150 mm drug-eluting balloon was used to angioplasty the popliteal this did not yield much improvement we then moved to a 5 mm x 100 mm drug-eluting balloon which we angioplastied to 7 kings which resolved the stenosis at the popliteal level.  At completion there is strong biphasic and triphasic signals in the posterior tibial and anterior tibial respectively.  Following this the patient had area of the foot prepped with Betadine paint and then debridement using a rongeur sharp excisional was used to remove all areas of purulence and after debridement of this area 4 x 6 x 3 cm we were unable to express any more purulent drainage or pus.  No bone was removed.  At completion the patient tolerated the procedure was packed with Betadine wet-to-dry gauze.  Perclose device was used to close the right groin without complication.    Radiographic Findings:  Angiographic portions procedure include single renal artery bilaterally aorta bilateral common iliacs hypogastrics external iliacs and common femoral profunda and SFA origins bilaterally are widely patent without significant disease.  Left leg runoff shows wide patency of the SFA down to the above-knee popliteal where there is a 60% stenosis above the knee in the 60% infrageniculate stenosis serially throughout the above and infrageniculate popliteal to below-knee popliteal is widely patent to the trifurcation with normal flow in all 3 vessels down to the ankle where the posterior  tibial feeds to the plantar arch well.  The anterior tibial has stenosis at a transitions of the dorsalis pedis that is greater than 80%.  This is subsequently crossed with 014 wire and angioplastied limiting it down to about 40% at that point.  We are unable to improve this past that segment due to the length of access and concerns for balloon size.  Attention was turned to the popliteal which was angioplastied first with a 4 mm drug-eluting balloon and then a 5 mm drug-eluting balloon 4 mm balloon did not improve significantly but the 5 mm resolved the stenosis completely without dissection or complicating feature.  Wide patency is seen at the popliteal level at completion thank you    Active Hospital Problems    Diagnosis  POA   • **Cellulitis of left foot [L03.116]  Yes   • MARTIN (acute kidney injury) (HCC) [N17.9]  Yes   • Diarrhea [R19.7]  No   • Hyponatremia [E87.1]  Yes   • 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      Baltazar Oh MD     Date: 3/28/2023  Time: 15:15 EDT

## 2023-03-28 NOTE — PROGRESS NOTES
"Nutrition Services    Patient Name:  Tomy Manjarrez  YOB: 1950  MRN: [id number]  Admit Date:  3/22/2023    Assessment Date:  03/28/23    Comment: Nutrition follow up. Pt off the floor and in the OR for L arteriogram with possible debridement of L foot. Wound VAC was placed yesterday. Has been eating pretty well, 50-75% of meals. Labs, meds reviewed. Will add Cesar bid to support wound once diet is resumed. RD to cont to follow.      CLINICAL NUTRITION ASSESSMENT      Reason for Assessment Follow-up Protocol     Diagnosis/Problem   Diabetic foot infection left foot     Encounter Information        Nutrition History:     Food Preferences:    Supplements:    Factors Affecting Intake: No factors at this time     Anthropometrics        Current Height  Current Weight  BMI kg/m2 Height: 190.5 cm (75\")  Weight: 127 kg (279 lb 8.7 oz) (03/28/23 0511)  Body mass index is 34.94 kg/m².   Adjusted BMI (if applicable)        Admission Weight 123kg       Ideal Body Weight (IBW) 84.5kg   Adjusted IBW (if applicable)        Usual Body Weight (UBW)    Weight Change/Trend Gain       Weight History Wt Readings from Last 30 Encounters:   03/28/23 0511 127 kg (279 lb 8.7 oz)   03/27/23 0531 123 kg (271 lb 9.7 oz)   03/26/23 0609 123 kg (272 lb 0.8 oz)   03/25/23 0546 123 kg (270 lb 1 oz)   03/24/23 0544 123 kg (271 lb 9.7 oz)   03/22/23 2235 114 kg (252 lb 3.3 oz)   03/22/23 1711 113 kg (250 lb)   08/04/22 1057 110 kg (243 lb)   01/18/22 1057 104 kg (230 lb)   09/20/21 0618 95.7 kg (211 lb)   09/10/21 1346 100 kg (221 lb)   08/06/21 1325 109 kg (240 lb)   07/09/21 1056 108 kg (239 lb)   01/12/21 1031 110 kg (243 lb)   11/19/19 1326 99.7 kg (219 lb 12.8 oz)   11/12/19 1416 104 kg (230 lb 4 oz)             Estimated/Assessed Needs       Energy Requirements    Height for Calculation  Height: 190.5 cm (75\")   Weight for Calculation 123kg   Method for Estimation  20 kcal/kg   EST Needs (kcal/day) 2460       Protein " Requirements    Weight for Calculation 84.5kg   EST Protein Needs (g/kg) 1.2 gm/kg   EST Daily Needs (g/day) 101       Fluid Requirements     Method for Estimation Defer to physician    Estimated Needs (mL/day)      Tests/Procedures        Tests/Procedures Other: L arteriogram and possible debridement of L foot 3/28     Labs       Pertinent Labs    Results from last 7 days   Lab Units 03/28/23 0457 03/27/23  0430 03/26/23 0455 03/25/23 0523 03/23/23 0453 03/22/23  2335   SODIUM mmol/L 138  --  135* 137   < > 134*   POTASSIUM mmol/L 4.5  --  4.1 4.3   < > 4.2   CHLORIDE mmol/L 105  --  105 104   < > 100   CO2 mmol/L 26.5  --  22.0 22.0   < > 20.8*   BUN mg/dL 30*  --  47* 43*   < > 48*   CREATININE mg/dL 2.16* 2.11* 2.30* 2.25*   < > 2.96*   CALCIUM mg/dL 9.4  --  9.2 9.0   < > 8.9   BILIRUBIN mg/dL 0.6  --   --   --   --  0.7   ALK PHOS U/L 157*  --   --   --   --  209*   ALT (SGPT) U/L 22  --   --   --   --  10   AST (SGOT) U/L 33  --   --   --   --  20   GLUCOSE mg/dL 126*  --  176* 235*   < > 218*    < > = values in this interval not displayed.     Results from last 7 days   Lab Units 03/28/23 0457   MAGNESIUM mg/dL 1.8   HEMOGLOBIN g/dL 11.3*   HEMATOCRIT % 34.0*   WBC 10*3/mm3 10.80   ALBUMIN g/dL 3.1*     Results from last 7 days   Lab Units 03/28/23 0457 03/26/23 0455 03/25/23 0523 03/24/23 0527 03/23/23 0453   PLATELETS 10*3/mm3 259 214 193 167 166     COVID19   Date Value Ref Range Status   09/17/2021 Not Detected Not Detected - Ref. Range Final     Lab Results   Component Value Date    HGBA1C 7.80 (H) 03/28/2023          Medications           Scheduled Medications [MAR Hold] allopurinol, 100 mg, Oral, Q PM  [MAR Hold] amitriptyline, 25 mg, Oral, Nightly  amLODIPine, 10 mg, Oral, QAM  [MAR Hold] atorvastatin, 40 mg, Oral, Nightly  [MAR Hold] DULoxetine, 60 mg, Oral, Daily  [MAR Hold] enoxaparin, 40 mg, Subcutaneous, Daily  [MAR Hold] fenofibrate, 145 mg, Oral, Daily  [MAR Hold] ferrous sulfate,  325 mg, Oral, Daily With Breakfast  [MAR Hold] finasteride, 5 mg, Oral, Daily  [MAR Hold] losartan, 100 mg, Oral, Q24H   And  [MAR Hold] hydroCHLOROthiazide, 25 mg, Oral, Q24H  [MAR Hold] insulin glargine, 30 Units, Subcutaneous, Nightly  [MAR Hold] insulin lispro, 0-14 Units, Subcutaneous, TID AC  levoFLOXacin, 750 mg, Oral, Every Other Day  nebivolol, 10 mg, Oral, QAM  [MAR Hold] pantoprazole, 40 mg, Oral, Q AM  pregabalin, 200 mg, Oral, Daily  [MAR Hold] sodium chloride, 10 mL, Intravenous, Q12H  sodium chloride, 3 mL, Intravenous, Q12H  [MAR Hold] tamsulosin, 0.4 mg, Oral, Daily  [MAR Hold] tiZANidine, 4 mg, Oral, Nightly  [MAR Hold] traZODone, 100 mg, Oral, Nightly       Infusions lactated ringers, 9 mL/hr, Last Rate: 9 mL/hr (03/28/23 1302)  sodium chloride, 100 mL/hr, Last Rate: 100 mL/hr (03/28/23 0257)  sodium chloride, 9 mL/hr       PRN Medications •  [MAR Hold] acetaminophen  •  [MAR Hold] dextrose  •  [MAR Hold] dextrose  •  [MAR Hold] glucagon (human recombinant)  •  [MAR Hold] HYDROcodone-acetaminophen  •  [MAR Hold] HYDROmorphone **AND** [MAR Hold] naloxone  •  lidocaine PF 1%  •  [MAR Hold] nitroglycerin  •  [MAR Hold] ondansetron **OR** [MAR Hold] ondansetron  •  [MAR Hold] sodium chloride  •  sodium chloride  •  [MAR Hold] sodium chloride  •  sodium chloride  •  [MAR Hold] sodium hypochlorite (DAKIN'S 1/8 Strength) 0.0625% topical solution (VERAFLO)     Physical Findings          Physical Appearance alert, obese, oriented   Oral/Mouth Cavity WNL   Edema  1+ (trace), 2+ (mild)   Gastrointestinal last bowel movement:3/28   Skin  diabetic ulcer, non-healing wound(s), wound VAC to foot   Tubes/Drains none   NFPE Not applicable at this time   --  Current Nutrition Orders & Evaluation of Intake       Oral Nutrition     Food Allergies NKFA   Current PO Diet NPO Diet NPO Type: Strict NPO   Supplement n/a   PO Evaluation     % PO Intake 50-75%    # of Days Evaluated    --  PES STATEMENT / NUTRITION  DIAGNOSIS      Nutrition Dx Problem  Problem: Increased Nutrient Needs  Etiology: Medical Diagnosis Diabetic foot infection left foot  Signs/Symptoms: Report/Observation    Comment:    --  NUTRITION INTERVENTION / PLAN OF CARE      Intervention Goal(s) Maintain nutrition status, Meet estimated needs, Disease management/therapy, Tolerate PO  and No significant weight loss         RD Intervention/Action Await begin PO diet, Follow Tx Progress and Care plan reviewed         Prescription/Orders:       PO Diet       Supplements Cesar bid      Snacks       Enteral Nutrition       Parenteral Nutrition    New Prescription Ordered? Yes   --      Monitor/Evaluation Per protocol, I&O, PO intake, Pertinent labs, Skin status, Symptoms   Discharge Plan/Needs Pending clinical course   Education Will instruct as appropriate   --    RD to follow per protocol.      Electronically signed by:  Adry Felipe RD  03/28/23 13:26 EDT

## 2023-03-28 NOTE — PROGRESS NOTES
"    Name: Tomy Manjarrez ADMIT: 3/22/2023   : 1950  PCP: Zoya Claros PA    MRN: [id number] LOS: 6 days   AGE/SEX: 73 y.o. male  ROOM: [facility]     Subjective   Subjective   Pt seen in PACU. Agitated postop. \"Why don't y'all leave me the fuck alone?!\"    Review of Systems      Unable to obtain    Objective   Objective   Vital Signs  Temp:  [98.5 °F (36.9 °C)-99.6 °F (37.6 °C)] 99.6 °F (37.6 °C)  Heart Rate:  [66-71] 68  Resp:  [16-18] 16  BP: (137-157)/(58-82) 137/58  SpO2:  [95 %-100 %] 100 %  on   ;   Device (Oxygen Therapy): room air  Body mass index is 34.94 kg/m².  Physical Exam  Vitals and nursing note reviewed. Exam conducted with a chaperone present (PACU RNs x 3).   Constitutional:       General: He is not in acute distress.     Appearance: He is ill-appearing (chronically). He is not toxic-appearing or diaphoretic.   HENT:      Head: Normocephalic.      Nose: Nose normal.      Mouth/Throat:      Mouth: Mucous membranes are moist.      Pharynx: Oropharynx is clear.   Eyes:      General: No scleral icterus.        Right eye: No discharge.         Left eye: No discharge.      Extraocular Movements: Extraocular movements intact.      Conjunctiva/sclera: Conjunctivae normal.   Cardiovascular:      Rate and Rhythm: Normal rate and regular rhythm.      Pulses: Normal pulses.   Pulmonary:      Effort: Pulmonary effort is normal. No respiratory distress.      Breath sounds: Normal breath sounds. No wheezing or rales.   Abdominal:      General: Bowel sounds are normal. There is no distension.      Palpations: Abdomen is soft.      Tenderness: There is no abdominal tenderness.   Genitourinary:     Comments: Sandoval in place, yellow urine in bag  Musculoskeletal:         General: No swelling or deformity. Normal range of motion.      Cervical back: Neck supple. No rigidity.      Comments: Dressing to left foot  NVI in distal BLEs   Lymphadenopathy:      Cervical: No cervical " adenopathy.   Skin:     General: Skin is warm and dry.      Capillary Refill: Capillary refill takes less than 2 seconds.      Coloration: Skin is not jaundiced.      Findings: No rash.   Neurological:      Mental Status: He is alert.      Cranial Nerves: No cranial nerve deficit.      Coordination: Coordination normal.   Psychiatric:      Comments: Agitated, confused       Results Review     I reviewed the patient's new clinical results.  Results from last 7 days   Lab Units 03/28/23 0457 03/26/23 0455 03/25/23 0523 03/24/23  0527   WBC 10*3/mm3 10.80 10.15 12.02* 15.29*   HEMOGLOBIN g/dL 11.3* 10.6* 10.9* 10.0*   PLATELETS 10*3/mm3 259 214 193 167     Results from last 7 days   Lab Units 03/28/23 0457 03/27/23 0430 03/26/23 0455 03/25/23 0523 03/24/23 0527   SODIUM mmol/L 138  --  135* 137 137   POTASSIUM mmol/L 4.5  --  4.1 4.3 4.0   CHLORIDE mmol/L 105  --  105 104 104   CO2 mmol/L 26.5  --  22.0 22.0 23.0   BUN mg/dL 30*  --  47* 43* 44*   CREATININE mg/dL 2.16* 2.11* 2.30* 2.25* 2.53*   GLUCOSE mg/dL 126*  --  176* 235* 193*   EGFR mL/min/1.73 31.3* 32.2* 29.1* 29.8* 25.9*     Results from last 7 days   Lab Units 03/28/23 0457 03/22/23  2335   ALBUMIN g/dL 3.1* 3.0*   BILIRUBIN mg/dL 0.6 0.7   ALK PHOS U/L 157* 209*   AST (SGOT) U/L 33 20   ALT (SGPT) U/L 22 10     Results from last 7 days   Lab Units 03/28/23 0457 03/26/23 0455 03/25/23 0523 03/24/23 0527 03/23/23 0453 03/22/23  2335   CALCIUM mg/dL 9.4 9.2 9.0 8.6   < > 8.9   ALBUMIN g/dL 3.1*  --   --   --   --  3.0*   MAGNESIUM mg/dL 1.8  --   --   --   --   --     < > = values in this interval not displayed.     Results from last 7 days   Lab Units 03/22/23  2335 03/22/23  1830   PROCALCITONIN ng/mL 1.89*  --    LACTATE mmol/L  --  1.2     Hemoglobin A1C   Date/Time Value Ref Range Status   03/28/2023 0457 7.80 (H) 4.80 - 5.60 % Final     Glucose   Date/Time Value Ref Range Status   03/28/2023 0948 134 (H) 70 - 130 mg/dL Final     Comment:      Meter: [id number] : [id number] Mark Castro BREE   03/28/2023 0600 119 70 - 130 mg/dL Final     Comment:     Meter: [id number] : [id number] Pete Quezada NA   03/27/2023 2042 138 (H) 70 - 130 mg/dL Final     Comment:     Meter: [id number] : [id number] Pete Quezada NA   03/27/2023 1551 176 (H) 70 - 130 mg/dL Final     Comment:     Meter: [id number] : [id number] Beaver Atticcus NA   03/27/2023 1044 146 (H) 70 - 130 mg/dL Final     Comment:     Meter: [id number] : [id number] Beaver Atticcus NA   03/27/2023 0642 100 70 - 130 mg/dL Final     Comment:     Meter: [id number] : [id number] Rajiv Dickerson NA   03/26/2023 2056 188 (H) 70 - 130 mg/dL Final     Comment:     Meter: [id number] : [id number] Beaver Atticcus NA       No radiology results for the last day  I have personally reviewed all medications:  Scheduled Medications  [MAR Hold] allopurinol, 100 mg, Oral, Q PM  [MAR Hold] amitriptyline, 25 mg, Oral, Nightly  amLODIPine, 10 mg, Oral, QAM  [MAR Hold] atorvastatin, 40 mg, Oral, Nightly  [MAR Hold] DULoxetine, 60 mg, Oral, Daily  [MAR Hold] enoxaparin, 40 mg, Subcutaneous, Daily  [MAR Hold] fenofibrate, 145 mg, Oral, Daily  [MAR Hold] ferrous sulfate, 325 mg, Oral, Daily With Breakfast  [MAR Hold] finasteride, 5 mg, Oral, Daily  [MAR Hold] losartan, 100 mg, Oral, Q24H   And  [MAR Hold] hydroCHLOROthiazide, 25 mg, Oral, Q24H  [MAR Hold] insulin glargine, 30 Units, Subcutaneous, Nightly  [MAR Hold] insulin lispro, 0-14 Units, Subcutaneous, TID AC  levoFLOXacin, 750 mg, Oral, Every Other Day  nebivolol, 10 mg, Oral, QAM  [MAR Hold] pantoprazole, 40 mg, Oral, Q AM  pregabalin, 200 mg, Oral, Daily  [MAR Hold] sodium chloride, 10 mL, Intravenous, Q12H  sodium chloride, 3 mL, Intravenous, Q12H  [MAR Hold] tamsulosin, 0.4 mg, Oral, Daily  [MAR Hold] tiZANidine, 4 mg, Oral, Nightly  [MAR Hold] traZODone, 100 mg, Oral, Nightly    Infusions  lactated ringers, 9 mL/hr, Last Rate: 9 mL/hr  (03/28/23 1302)  sodium chloride, 100 mL/hr, Last Rate: 100 mL/hr (03/28/23 0257)  sodium chloride, 9 mL/hr    Diet  NPO Diet NPO Type: Strict NPO    I have personally reviewed:  [x]  Laboratory   [x]  Microbiology   []  Radiology   [x]  EKG/Telemetry  []  Cardiology/Vascular   []  Pathology    []  Records       Assessment/Plan     Active Hospital Problems    Diagnosis  POA   • **Cellulitis of left foot [L03.116]  Yes   • MARTIN (acute kidney injury) (HCC) [N17.9]  Yes   • Diarrhea [R19.7]  No   • Hyponatremia [E87.1]  Yes   • 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       72yo gentleman with left DFU    Left DFU: no osteo on MRI, appreciate ID and Vasc attention to pt, s/p I&D on 3/24 by Dr. Oh, culture growing Strep and E.coli, IV Vanc/Zosyn now changed to PO LQN Q48h per ID, ID treating with 6 weeks of abx for possible osteo (stop date 5/5/23), s/p A-gram today to assess circulation and repeat I&D, irrigating wound vac planned    DM2  DPN: A1c 7.8, sugars acceptable on current basal/bolus regimen, continue Lyrica    MARTIN/CKD3b: Cr stable at baseline now    Anemia of CKD: Hgb stable    HTN: BPs acceptable on amlodipine, losartan, HCTZ, Bystolic    BPH  Urinary retention: continue lee, continue Proscar and Flomax, f/u with  as outpt ([facility])    RCC: f/u with Oncology as outpt    Diarrhea NOS: no abd pain or fever, WBC normalized, doubt CDiff, gave a single dose of Imodium yesterday with good result so far      · Lovenox 40 mg SC daily for DVT prophylaxis.  · Full code.  · Discussed with patient and PACU RNs.  · Anticipate discharge home with  family, timing yet to be determined.      Suman Leon MD  [facility]  03/28/23  14:42 EDT

## 2023-03-28 NOTE — NURSING NOTE
Dr Oh at bedside. Removed ace wrap from left foot and ordered to not wrap ace wraps around Lt foot any longer. Place order as nursing communication.     Notified Dr Oh pt has Zosyn due. Dr Oh ordered to D/C Ancef and give Zosyn.

## 2023-03-28 NOTE — ANESTHESIA PROCEDURE NOTES
Airway  Urgency: elective    Date/Time: 3/28/2023 1:13 PM  Airway not difficult    General Information and Staff    Patient location during procedure: OR  Anesthesiologist: Zakia Monreal MD  CRNA/CAA: Mikala Rangel CRNA    Indications and Patient Condition  Indications for airway management: airway protection    Preoxygenated: yes  MILS not maintained throughout  Mask difficulty assessment: 0 - not attempted    Final Airway Details  Final airway type: supraglottic airway      Successful airway: unique  Size 5     Number of attempts at approach: 1  Assessment: lips, teeth, and gum same as pre-op and atraumatic intubation    Additional Comments  LMA inserted with ease, assist to SV

## 2023-03-28 NOTE — ANESTHESIA PREPROCEDURE EVALUATION
Anesthesia Evaluation     Patient summary reviewed and Nursing notes reviewed   no history of anesthetic complications:  NPO Solid Status: > 8 hours  NPO Liquid Status: > 2 hours           Airway   Mallampati: II  TM distance: >3 FB  Neck ROM: full  Dental      Pulmonary    Cardiovascular     ECG reviewed    (+) hypertension 2 medications or greater, hyperlipidemia,       Neuro/Psych  GI/Hepatic/Renal/Endo    (+) obesity,  GERD,  renal disease CRI, diabetes mellitus type 2 using insulin,     Musculoskeletal     Abdominal    Substance History      OB/GYN          Other   arthritis, blood dyscrasia anemia,                       Anesthesia Plan    ASA 3     general     (GA with LMA due to possible I & D foot )  intravenous induction     Anesthetic plan, risks, benefits, and alternatives have been provided, discussed and informed consent has been obtained with: patient.        CODE STATUS:

## 2023-03-29 LAB
ALBUMIN SERPL-MCNC: 3.3 G/DL (ref 3.5–5.2)
ALBUMIN/GLOB SERPL: 0.8 G/DL
ALP SERPL-CCNC: 148 U/L (ref 39–117)
ALT SERPL W P-5'-P-CCNC: 18 U/L (ref 1–41)
ANION GAP SERPL CALCULATED.3IONS-SCNC: 7.6 MMOL/L (ref 5–15)
AST SERPL-CCNC: 30 U/L (ref 1–40)
BACTERIA SPEC ANAEROBE CULT: NORMAL
BASOPHILS # BLD AUTO: 0.06 10*3/MM3 (ref 0–0.2)
BASOPHILS NFR BLD AUTO: 0.5 % (ref 0–1.5)
BILIRUB SERPL-MCNC: 0.5 MG/DL (ref 0–1.2)
BUN SERPL-MCNC: 25 MG/DL (ref 8–23)
BUN/CREAT SERPL: 12.9 (ref 7–25)
CALCIUM SPEC-SCNC: 9.2 MG/DL (ref 8.6–10.5)
CHLORIDE SERPL-SCNC: 105 MMOL/L (ref 98–107)
CO2 SERPL-SCNC: 26.4 MMOL/L (ref 22–29)
CREAT SERPL-MCNC: 1.94 MG/DL (ref 0.76–1.27)
DEPRECATED RDW RBC AUTO: 42.8 FL (ref 37–54)
EGFRCR SERPLBLD CKD-EPI 2021: 35.7 ML/MIN/1.73
EOSINOPHIL # BLD AUTO: 0.73 10*3/MM3 (ref 0–0.4)
EOSINOPHIL NFR BLD AUTO: 6.3 % (ref 0.3–6.2)
ERYTHROCYTE [DISTWIDTH] IN BLOOD BY AUTOMATED COUNT: 13.8 % (ref 12.3–15.4)
GLOBULIN UR ELPH-MCNC: 4 GM/DL
GLUCOSE BLDC GLUCOMTR-MCNC: 125 MG/DL (ref 70–130)
GLUCOSE BLDC GLUCOMTR-MCNC: 129 MG/DL (ref 70–130)
GLUCOSE BLDC GLUCOMTR-MCNC: 154 MG/DL (ref 70–130)
GLUCOSE BLDC GLUCOMTR-MCNC: 165 MG/DL (ref 70–130)
GLUCOSE SERPL-MCNC: 124 MG/DL (ref 65–99)
HCT VFR BLD AUTO: 31.7 % (ref 37.5–51)
HGB BLD-MCNC: 10.8 G/DL (ref 13–17.7)
IMM GRANULOCYTES # BLD AUTO: 0.1 10*3/MM3 (ref 0–0.05)
IMM GRANULOCYTES NFR BLD AUTO: 0.9 % (ref 0–0.5)
LYMPHOCYTES # BLD AUTO: 2.35 10*3/MM3 (ref 0.7–3.1)
LYMPHOCYTES NFR BLD AUTO: 20.4 % (ref 19.6–45.3)
MAGNESIUM SERPL-MCNC: 1.8 MG/DL (ref 1.6–2.4)
MCH RBC QN AUTO: 29.1 PG (ref 26.6–33)
MCHC RBC AUTO-ENTMCNC: 34.1 G/DL (ref 31.5–35.7)
MCV RBC AUTO: 85.4 FL (ref 79–97)
MONOCYTES # BLD AUTO: 1.22 10*3/MM3 (ref 0.1–0.9)
MONOCYTES NFR BLD AUTO: 10.6 % (ref 5–12)
NEUTROPHILS NFR BLD AUTO: 61.3 % (ref 42.7–76)
NEUTROPHILS NFR BLD AUTO: 7.07 10*3/MM3 (ref 1.7–7)
NRBC BLD AUTO-RTO: 0 /100 WBC (ref 0–0.2)
PLATELET # BLD AUTO: 275 10*3/MM3 (ref 140–450)
PMV BLD AUTO: 12.2 FL (ref 6–12)
POTASSIUM SERPL-SCNC: 4.2 MMOL/L (ref 3.5–5.2)
PROT SERPL-MCNC: 7.3 G/DL (ref 6–8.5)
RBC # BLD AUTO: 3.71 10*6/MM3 (ref 4.14–5.8)
SODIUM SERPL-SCNC: 139 MMOL/L (ref 136–145)
VANCOMYCIN TROUGH SERPL-MCNC: 7.9 MCG/ML (ref 5–20)
WBC NRBC COR # BLD: 11.53 10*3/MM3 (ref 3.4–10.8)

## 2023-03-29 PROCEDURE — 80202 ASSAY OF VANCOMYCIN: CPT | Performed by: SURGERY

## 2023-03-29 PROCEDURE — 83735 ASSAY OF MAGNESIUM: CPT | Performed by: HOSPITALIST

## 2023-03-29 PROCEDURE — 94664 DEMO&/EVAL PT USE INHALER: CPT

## 2023-03-29 PROCEDURE — 82962 GLUCOSE BLOOD TEST: CPT

## 2023-03-29 PROCEDURE — 94799 UNLISTED PULMONARY SVC/PX: CPT

## 2023-03-29 PROCEDURE — 85025 COMPLETE CBC W/AUTO DIFF WBC: CPT | Performed by: SURGERY

## 2023-03-29 PROCEDURE — 25010000002 ENOXAPARIN PER 10 MG: Performed by: SURGERY

## 2023-03-29 PROCEDURE — 94761 N-INVAS EAR/PLS OXIMETRY MLT: CPT

## 2023-03-29 PROCEDURE — 80053 COMPREHEN METABOLIC PANEL: CPT | Performed by: HOSPITALIST

## 2023-03-29 PROCEDURE — 97164 PT RE-EVAL EST PLAN CARE: CPT

## 2023-03-29 PROCEDURE — 97116 GAIT TRAINING THERAPY: CPT

## 2023-03-29 PROCEDURE — 99232 SBSQ HOSP IP/OBS MODERATE 35: CPT | Performed by: STUDENT IN AN ORGANIZED HEALTH CARE EDUCATION/TRAINING PROGRAM

## 2023-03-29 RX ADMIN — IPRATROPIUM BROMIDE AND ALBUTEROL SULFATE 3 ML: .5; 2.5 SOLUTION RESPIRATORY (INHALATION) at 07:47

## 2023-03-29 RX ADMIN — IPRATROPIUM BROMIDE AND ALBUTEROL SULFATE 3 ML: .5; 2.5 SOLUTION RESPIRATORY (INHALATION) at 20:03

## 2023-03-29 RX ADMIN — ATORVASTATIN CALCIUM 40 MG: 20 TABLET, FILM COATED ORAL at 20:18

## 2023-03-29 RX ADMIN — HYDROCODONE BITARTRATE AND ACETAMINOPHEN 1 TABLET: 7.5; 325 TABLET ORAL at 04:17

## 2023-03-29 RX ADMIN — LOSARTAN POTASSIUM 100 MG: 100 TABLET, FILM COATED ORAL at 08:49

## 2023-03-29 RX ADMIN — HYDROCHLOROTHIAZIDE 25 MG: 25 TABLET ORAL at 08:49

## 2023-03-29 RX ADMIN — PREGABALIN 200 MG: 100 CAPSULE ORAL at 08:49

## 2023-03-29 RX ADMIN — ALLOPURINOL 100 MG: 100 TABLET ORAL at 20:19

## 2023-03-29 RX ADMIN — AMLODIPINE BESYLATE 10 MG: 10 TABLET ORAL at 06:29

## 2023-03-29 RX ADMIN — ENOXAPARIN SODIUM 40 MG: 100 INJECTION SUBCUTANEOUS at 08:49

## 2023-03-29 RX ADMIN — SODIUM HYPOCHLORITE 946 ML: 1.25 SOLUTION TOPICAL at 14:32

## 2023-03-29 RX ADMIN — FENOFIBRATE 145 MG: 145 TABLET, FILM COATED ORAL at 08:49

## 2023-03-29 RX ADMIN — TAMSULOSIN HYDROCHLORIDE 0.4 MG: 0.4 CAPSULE ORAL at 08:52

## 2023-03-29 RX ADMIN — FERROUS SULFATE TAB 325 MG (65 MG ELEMENTAL FE) 325 MG: 325 (65 FE) TAB at 13:53

## 2023-03-29 RX ADMIN — IPRATROPIUM BROMIDE AND ALBUTEROL SULFATE 3 ML: .5; 2.5 SOLUTION RESPIRATORY (INHALATION) at 15:12

## 2023-03-29 RX ADMIN — HYDROCODONE BITARTRATE AND ACETAMINOPHEN 1 TABLET: 7.5; 325 TABLET ORAL at 20:18

## 2023-03-29 RX ADMIN — INSULIN GLARGINE-YFGN 30 UNITS: 100 INJECTION, SOLUTION SUBCUTANEOUS at 21:52

## 2023-03-29 RX ADMIN — Medication 10 ML: at 20:19

## 2023-03-29 RX ADMIN — HYDROCODONE BITARTRATE AND ACETAMINOPHEN 1 TABLET: 7.5; 325 TABLET ORAL at 08:49

## 2023-03-29 RX ADMIN — TIZANIDINE 4 MG: 4 TABLET ORAL at 21:53

## 2023-03-29 RX ADMIN — NEBIVOLOL 10 MG: 10 TABLET ORAL at 06:29

## 2023-03-29 RX ADMIN — PANTOPRAZOLE SODIUM 40 MG: 40 TABLET, DELAYED RELEASE ORAL at 06:30

## 2023-03-29 RX ADMIN — FINASTERIDE 5 MG: 5 TABLET, FILM COATED ORAL at 08:49

## 2023-03-29 RX ADMIN — DULOXETINE HYDROCHLORIDE 60 MG: 60 CAPSULE, DELAYED RELEASE ORAL at 08:49

## 2023-03-29 RX ADMIN — AMITRIPTYLINE HYDROCHLORIDE 25 MG: 25 TABLET, FILM COATED ORAL at 20:18

## 2023-03-29 RX ADMIN — TRAZODONE HYDROCHLORIDE 100 MG: 100 TABLET ORAL at 21:53

## 2023-03-29 NOTE — DISCHARGE PLACEMENT REQUEST
"ChayoRon perry (73 y.o. Male)     Date of Birth   1950    Social Security Number       Address   [address] [place] [address]    Home Phone   [phone]    MRN   [id number]       [facility]    Marital Status   Single                            Admission Date   3/22/23    Admission Type   Emergency    Admitting Provider   Dani Young MD    Attending Provider   Baltazar Velasco MD    Department, Room/Bed   [address], [address]/1       Discharge Date       Discharge Disposition       Discharge Destination                               Attending Provider: Baltazar Velasco MD    Allergies: No Known Allergies    Isolation: Spore   Infection: C.difficile (rule out) (03/28/23)   Code Status: CPR    Ht: 190.5 cm (75\")   Wt: 122 kg (269 lb 13.5 oz)    Admission Cmt: None   Principal Problem: Cellulitis of left foot [L03.116]                 Active Insurance as of 3/22/2023     Primary Coverage     Payor Plan Insurance Group Employer/Plan Group    HUMANA MEDICARE REPLACEMENT HUMANA MEDICARE REPLACEMENT [id number]     Payor Plan Address Payor Plan Phone Number Payor Plan Fax Number Effective Dates    [address] [address] [phone]  1/1/2018 - None Entered    [facility] [id number]       Subscriber Name Subscriber Birth Date Member ID       RON DOMINGO 1950 [id number]                 Emergency Contacts      (Rel.) Home Phone Work Phone Mobile Phone    BELÉN DOMINGO (Daughter) -- -- [phone]    MAHOGANY NOLASCO (Sister) -- -- [phone]    CHAYORON (Son) -- -- [phone]              "

## 2023-03-29 NOTE — PLAN OF CARE
Goal Outcome Evaluation:  Plan of Care Reviewed With: patient        Progress: no change  Outcome Evaluation: VSS. Alert and oriented x4. Pt c/o moderate pain in LLE. PRN pain meds x2. Up to bedside commode x1 assist. Pt c/o persistent diarrhea x5 days. LHA notified and C.DIff sample ordered. No BM this shift. Palpable BLE pulses. Right groin puncture C/D/I and soft. Will continue to provide supportive care.

## 2023-03-29 NOTE — NURSING NOTE
CWOCN- wound vac replaced post debridement, dakins instillation continued at 12ml every 2 hours with 10 min instillation. Patient tolerated dressing change well, actually dozing off while I was placing the VAC. Will continue to follow and change M-W-F.      03/29/23 1501   Wound 03/24/23 Left anterior foot Incision   Placement Date: 03/24/23   Present on Hospital Admission: No  Side: Left  Orientation: anterior  Location: foot  Primary Wound Type: Incision   Dressing Appearance moist drainage   Base clean;moist;pink;red   Periwound intact   Periwound Temperature warm   Periwound Skin Turgor soft   Edges open   Wound Length (cm) 7.5 cm   Wound Width (cm) 2.5 cm   Wound Depth (cm) 2.3 cm   Wound Surface Area (cm^2) 18.75 cm^2   Wound Volume (cm^3) 43.125 cm^3   Drainage Characteristics/Odor serous   Drainage Amount small   Care, Wound cleansed with;sterile normal saline;negative pressure wound therapy   Dressing Care dressing changed   Periwound Care barrier film applied;dry periwound area maintained   NPWT (Negative Pressure Wound Therapy) 03/29/23 1500 left foot   Placement Date/Time: 03/29/23 1500   Location: left foot   Therapy Setting continuous therapy   Dressing foam, black;transparent dressing  (veraflow dressing)   Instillation other (see comments)  (dakins)   Pressure Setting 125 mmHg   Sponges Inserted 2   Finger sweep complete Yes

## 2023-03-29 NOTE — PLAN OF CARE
Goal Outcome Evaluation:  Plan of Care Reviewed With: patient        Progress: improving  Outcome Evaluation: Pt seen for PT re-eval this PM. S/p LLE arteriogram, angioplasty, and L foot debridement yesterday. Pt remains heel WBAT with Darco shoe donned when OOB, noted plans for wound vac to be reapplied today. Pt sitting [facility] on arrival and stood with min A x1 - 2 attempts to complete d/t posterior LOB back into chair initially. Ambulated 15ft with rwx and CGA, very slow pace, forward flexed posture, and maintained B knee flexion throughout. Pt reports he had just started OPPT prior to admission to work on improving BLE strength and balance - BL gait impairments. Gait distance limited 2/2 SOA, pain, and fatigue - pt requesting return to chair. Pt tolerated LE exercises, left with needs met. PT will continue to follow to progress mobility as tolerated. Pt adamantly refuses SNF, stating he plans to go home with his sister with HHPT vs OPPT. Pt reports plans to move to [place] in August to live with his daughter who is a nurse and able to assist him as needed. Not interest in SNF prior to DC home.

## 2023-03-29 NOTE — PROGRESS NOTES
Name: Tomy Manjarrez ADMIT: 3/22/2023   : 1950  PCP: Zoya Claros PA    MRN: [id number] LOS: 7 days   AGE/SEX: 73 y.o. male  ROOM: [facility]     Subjective   Subjective   Doing pretty well today.  Denies any complaints.    Review of Systems     Objective   Objective   Vital Signs  Temp:  [98.2 °F (36.8 °C)-98.7 °F (37.1 °C)] 98.2 °F (36.8 °C)  Heart Rate:  [61-74] 74  Resp:  [14-20] 20  BP: (103-145)/(42-83) 118/71  SpO2:  [93 %-97 %] 96 %  on  Flow (L/min):  [2] 2;   Device (Oxygen Therapy): room air  Body mass index is 33.73 kg/m².  Physical Exam  Vitals reviewed.   Constitutional:       General: He is not in acute distress.     Appearance: He is well-developed.   HENT:      Head: Normocephalic and atraumatic.   Eyes:      General: No scleral icterus.  Neck:      Vascular: No JVD.   Cardiovascular:      Rate and Rhythm: Normal rate and regular rhythm.      Heart sounds: No murmur heard.  Pulmonary:      Effort: Pulmonary effort is normal. No respiratory distress.      Breath sounds: Normal breath sounds. No wheezing.   Abdominal:      General: There is no distension.      Palpations: Abdomen is soft.      Tenderness: There is no abdominal tenderness.   Musculoskeletal:      Right lower leg: No edema.      Left lower leg: No edema.      Comments: Left foot wrapped.  I did not remove for exam   Skin:     General: Skin is warm and dry.      Findings: No rash.   Neurological:      Mental Status: He is alert and oriented to person, place, and time.   Psychiatric:         Mood and Affect: Mood normal.       Results Review     I reviewed the patient's new clinical results.  Results from last 7 days   Lab Units 23  0550 23  0457 23  0455 23  0523   WBC 10*3/mm3 11.53* 10.80 10.15 12.02*   HEMOGLOBIN g/dL 10.8* 11.3* 10.6* 10.9*   PLATELETS 10*3/mm3 275 259 214 193     Results from last 7 days   Lab Units 23  0550 23  0457 23  0430 23  0455  03/25/23  0523   SODIUM mmol/L 139 138  --  135* 137   POTASSIUM mmol/L 4.2 4.5  --  4.1 4.3   CHLORIDE mmol/L 105 105  --  105 104   CO2 mmol/L 26.4 26.5  --  22.0 22.0   BUN mg/dL 25* 30*  --  47* 43*   CREATININE mg/dL 1.94* 2.16* 2.11* 2.30* 2.25*   GLUCOSE mg/dL 124* 126*  --  176* 235*   EGFR mL/min/1.73 35.7* 31.3* 32.2* 29.1* 29.8*     Results from last 7 days   Lab Units 03/29/23  0550 03/28/23  0457 03/22/23  2335   ALBUMIN g/dL 3.3* 3.1* 3.0*   BILIRUBIN mg/dL 0.5 0.6 0.7   ALK PHOS U/L 148* 157* 209*   AST (SGOT) U/L 30 33 20   ALT (SGPT) U/L 18 22 10     Results from last 7 days   Lab Units 03/29/23  0550 03/28/23  0457 03/26/23 0455 03/25/23  0523 03/23/23  0453 03/22/23  2335   CALCIUM mg/dL 9.2 9.4 9.2 9.0   < > 8.9   ALBUMIN g/dL 3.3* 3.1*  --   --   --  3.0*   MAGNESIUM mg/dL 1.8 1.8  --   --   --   --     < > = values in this interval not displayed.     Results from last 7 days   Lab Units 03/22/23  2335 03/22/23  1830   PROCALCITONIN ng/mL 1.89*  --    LACTATE mmol/L  --  1.2     Hemoglobin A1C   Date/Time Value Ref Range Status   03/28/2023 0457 7.80 (H) 4.80 - 5.60 % Final     Glucose   Date/Time Value Ref Range Status   03/29/2023 1110 154 (H) 70 - 130 mg/dL Final     Comment:     Meter: [id number] : [id number] Robbie HILLS   03/29/2023 0619 129 70 - 130 mg/dL Final     Comment:     Meter: [id number] : [id number] Arthur HILLS   03/28/2023 2112 204 (H) 70 - 130 mg/dL Final     Comment:     Meter: [id number] : [id number] Arthur Blair    03/28/2023 1459 140 (H) 70 - 130 mg/dL Final     Comment:     Meter: [id number] : [id number] Jackelyn Benz RN   03/28/2023 0948 134 (H) 70 - 130 mg/dL Final     Comment:     Meter: [id number] : [id number] Mark Castro BREE   03/28/2023 0600 119 70 - 130 mg/dL Final     Comment:     Meter: [id number] : [id number] Pete Quezada NA   03/27/2023 2042 138 (H) 70 - 130 mg/dL Final     Comment:     Meter: [id number] :  [id number] Pete HILLS       No radiology results for the last day  I have personally reviewed all medications:  Scheduled Medications  allopurinol, 100 mg, Oral, Q PM  amitriptyline, 25 mg, Oral, Nightly  amLODIPine, 10 mg, Oral, QAM  atorvastatin, 40 mg, Oral, Nightly  DULoxetine, 60 mg, Oral, Daily  enoxaparin, 40 mg, Subcutaneous, Daily  fenofibrate, 145 mg, Oral, Daily  ferrous sulfate, 325 mg, Oral, Daily With Lunch  finasteride, 5 mg, Oral, Daily  losartan, 100 mg, Oral, Q24H   And  hydroCHLOROthiazide, 25 mg, Oral, Q24H  insulin glargine, 30 Units, Subcutaneous, Nightly  insulin lispro, 0-14 Units, Subcutaneous, TID AC  ipratropium-albuterol, 3 mL, Nebulization, Q8H - RT  levoFLOXacin, 750 mg, Oral, Every Other Day  nebivolol, 10 mg, Oral, QAM  pantoprazole, 40 mg, Oral, Q AM  pregabalin, 200 mg, Oral, Daily  sodium chloride, 10 mL, Intravenous, Q12H  tamsulosin, 0.4 mg, Oral, Daily  tiZANidine, 4 mg, Oral, Nightly  traZODone, 100 mg, Oral, Nightly    Infusions  lactated ringers, 9 mL/hr, Last Rate: 9 mL/hr (03/28/23 1302)  sodium chloride, 100 mL/hr, Last Rate: 100 mL/hr (03/28/23 2250)  sodium chloride, 9 mL/hr    Diet  Diet: Cardiac Diets, Diabetic Diets; Healthy Heart (2-3 Na+); Consistent Carbohydrate; Texture: Regular Texture (IDDSI 7); Fluid Consistency: Thin (IDDSI 0)    I have personally reviewed:  [x]  Laboratory   [x]  Microbiology   [x]  Radiology   [x]  EKG/Telemetry  [x]  Cardiology/Vascular   []  Pathology    []  Records       Assessment/Plan     Active Hospital Problems    Diagnosis  POA   • **Cellulitis of left foot [L03.116]  Yes   • MARTIN (acute kidney injury) (HCC) [N17.9]  Yes   • Diarrhea [R19.7]  No   • Hyponatremia [E87.1]  Yes   • Clear cell carcinoma of kidney (HCC) [C64.9]  Yes   • BPH with urinary obstruction [N40.1, N13.8]  Yes   • Stage 3b chronic kidney disease (HCC) [N18.32]  Yes   • Essential hypertension [I10]  Yes   • Type 2 diabetes mellitus with hyperglycemia, with  long-term current use of insulin (HCC) [E11.65, Z79.4]  Not Applicable   • Hypertension [I10]  Yes      Resolved Hospital Problems   No resolved problems to display.       73 y.o. male admitted with infected DFU and Cellulitis of left foot.    Left DFU:  s/p I&D on 3/24 by Dr. Oh, culture growing Strep and E.coli  -Plan for PO LQN Q48h per ID x 6 weeks of abx for possible osteo (stop date 5/5/23)  -Wound VAC to be placed today and then reevaluated Friday 3/31.  Likely home with VAC at some point after that    PVD: s/p A-gram 3/28  with angioplasty to the left anterior tibial/dorsalis pedis and left popliteal     DM2 with peripheral neuropathy  - A1c 7.8, sugars acceptable on current basal/bolus regimen  -Continue Lyrica     MARTIN/CKD3b: Cr stable at baseline now.  Can DC IV fluids     Anemia of CKD: Hgb stable     HTN: BPs acceptable on amlodipine, losartan, HCTZ, Bystolic     BPH  Urinary retention:  Reviewed urology note from earlier this hospital stay.  He is ambulating some now with PT and would benefit from voiding trial.  We will remove Sandoval in a.m. and monitor closely  - Continue tamsulosin and finasteride    Diarrhea resolved.  No stool recorded x2 days.  No need to check C. difficile at this point, will cancel.  Can DC isolation      · Lovenox for DVT prophylaxis.  · Disposition: Home with home health/wound VAC when cleared by vascular.  Likely 2 to 3 days yet      Baltazar Velasco MD  [facility]  03/29/23  14:44 EDT

## 2023-03-29 NOTE — CASE MANAGEMENT/SOCIAL WORK
Continued Stay Note  [facility]     Patient Name: Tomy Manjarrez  MRN: [id number]  Today's Date: 3/29/2023    Admit Date: 3/22/2023    Plan: Home with [facility] and [employer] wound vac   Discharge Plan     Row Name 03/29/23 1413       Plan    Plan Home with [facility] and [employer] wound vac    Patient/Family in Agreement with Plan yes    Plan Comments Met with pt at bedside an discussed need for HH and wound vac.  Referral placed in [facility] for [facility] and [employer] wound vac.  [employer] order faxed.  Pt will need nursing for wound vac and PT.  Has walker at home.  No further needs at this time.  CCP continues to follow.  DONOVAN Pritchard RN               Discharge Codes    No documentation.               Expected Discharge Date and Time     Expected Discharge Date Expected Discharge Time    Mar 29, 2023             Martha Pritchard, RN

## 2023-03-29 NOTE — PROGRESS NOTES
LOS: 7 days     Chief Complaint: Diabetic foot infection    Interval History: Reports he is doing well this morning.  States he is no longer having any diarrhea and this stopped yesterday.  Denies any fevers or chills.  Denies any foot pain.    Vital Signs  Temp:  [98.3 °F (36.8 °C)-99.6 °F (37.6 °C)] 98.3 °F (36.8 °C)  Heart Rate:  [61-73] 68  Resp:  [14-18] 18  BP: (103-145)/(42-83) 145/71    Physical Exam:  General: In no acute distress  HEENT: Oropharynx clear, moist mucous membranes  Cardiovascular: RRR  Respiratory: Normal work of breathing  GI: Soft, NT/ND  Extremities: Improving edema and erythema of the foot.  Wound bed appears clean and intact without any further evidence of purulence.  Access: Peripheral IV.    Antibiotics:  Anti-Infectives (From admission, onward)    Ordered     Dose/Rate Route Frequency Start Stop    03/28/23 0942  levoFLOXacin (LEVAQUIN) tablet 750 mg        Ordering Provider: Baltazar Oh MD    750 mg Oral Every Other Day 03/28/23 1900 05/07/23 0859    03/25/23 1845  vancomycin 1250 mg/250 mL 0.9% NS IVPB ([facility])        Ordering Provider: Noah Pritchard MD    1,250 mg  over 60 Minutes Intravenous Every 24 Hours 03/26/23 0200 03/27/23 0306    03/23/23 0835  vancomycin (VANCOCIN) 1000 mg/200 mL dextrose 5% IVPB        Ordering Provider: Anuradha Desouza APRN    1,000 mg  over 60 Minutes Intravenous Once 03/23/23 1030 03/23/23 1332    03/22/23 1744  piperacillin-tazobactam (ZOSYN) 4.5 g in iso-osmotic dextrose 100 mL IVPB (premix)        Ordering Provider: Richard Felipe II, MD    4.5 g  over 30 Minutes Intravenous Once 03/22/23 1746 03/22/23 1910    03/22/23 1741  vancomycin 2250 mg/500 mL 0.9% NS IVPB ([facility])        Ordering Provider: Richard Felipe II, MD    20 mg/kg × 113 kg Intravenous Once 03/22/23 1743 03/22/23 2238           Results Review:     I reviewed the patient's new clinical results.    Lab Results   Component Value Date    WBC 11.53 (H)  03/29/2023    HGB 10.8 (L) 03/29/2023    HCT 31.7 (L) 03/29/2023    MCV 85.4 03/29/2023     03/29/2023     Lab Results   Component Value Date    GLUCOSE 124 (H) 03/29/2023    BUN 25 (H) 03/29/2023    CREATININE 1.94 (H) 03/29/2023    EGFRIFNONA 29 (L) 09/10/2021    EGFRIFAFRI 32 (L) 01/18/2022    BCR 12.9 03/29/2023    CO2 26.4 03/29/2023    CALCIUM 9.2 03/29/2023    ALBUMIN 3.3 (L) 03/29/2023    LABIL2 1.1 07/01/2020    AST 30 03/29/2023    ALT 18 03/29/2023       Microbiology:  3/22 blood cultures no growth to date  3/24 OR cultures strep anginosus and E. coli    Assessment    #Left diabetic foot infection status post irrigation and debridement on 3/24  #Type 2 diabetes  #MARTIN on CKD  #Renal clear-cell carcinoma     Continue p.o. levofloxacin 750 mg every 48 hours to cover strep and E. coli.  We will plan for 6 weeks of therapy through 5/5 and follow-up in clinic with me at that time.  Plans for replacement of the wound VAC today.  Appreciate vascular surgery's ongoing management.

## 2023-03-29 NOTE — THERAPY RE-EVALUATION
Patient Name: Tomy Manjarrez  : 1950    MRN: [id number]                              Today's Date: 3/29/2023       Admit Date: 3/22/2023    Visit Dx:     ICD-10-CM ICD-9-CM   1. Cellulitis of left foot  L03.116 682.7   2. Diabetic foot infection (HCC)  E11.628 250.80    L08.9 686.9     Patient Active Problem List   Diagnosis   • Left renal mass   • Type 2 diabetes mellitus with hyperglycemia, with long-term current use of insulin (HCC)   • Hypertension   • Diabetic neuropathy (HCC)   • Alcohol use   • Essential hypertension   • Chronic renal insufficiency, stage III (moderate) (HCC)   • Prostatocystitis   • Hyperglycemia due to type 2 diabetes mellitus (HCC)   • Diabetic peripheral neuropathy associated with type 2 diabetes mellitus (HCC)   • Stage 3b chronic kidney disease (HCC)   • Mixed hyperlipidemia   • Diabetic peripheral neuropathy (HCC)   • Stage 3b chronic kidney disease (HCC)   • Diabetic peripheral neuropathy (HCC)   • Essential hypertension   • Type 2 diabetes mellitus with hyperglycemia, with long-term current use of insulin (HCC)   • BPH with urinary obstruction   • Clear cell carcinoma of kidney (HCC)   • Cellulitis of left foot   • Hyponatremia   • MARTIN (acute kidney injury) (HCC)   • Diarrhea     Past Medical History:   Diagnosis Date   • Arthritis    • BPH (benign prostatic hyperplasia)    • Chronic back pain    • Chronic kidney disease (CKD), stage III (moderate) (HCC)    • Diabetes mellitus (HCC)     TYPE 2   • Difficulty urinating     REQUIRING STRAIGHT CATH   • GERD (gastroesophageal reflux disease)    • Gout    • Hyperlipidemia    • Hypertension    • Left kidney mass    • Neuropathy     IN FEET   • Neuropathy in diabetes (HCC)    • Type 2 diabetes mellitus (HCC)      Past Surgical History:   Procedure Laterality Date   • CARDIAC CATHETERIZATION     • COLONOSCOPY     • CYSTOSCOPY TRANSURETHRAL RESECTION OF PROSTATE N/A 2021    Procedure: CYSTOSCOPY TRANSURETHRAL RESECTION OF  PROSTATE TRANSURETHRAL INCISION OF PROSTATE;  Surgeon: Tres Mena MD;  Location: [facility] MAIN OR;  Service: Urology;  Laterality: N/A;   • NEPHRECTOMY PARTIAL Left 11/19/2019    Procedure: LEFT PARTIAL NEPHRECTOMY X2, INTRAOPERATIVE RENAL ULTRASONOGRAPHY;  Surgeon: Tres Mena MD;  Location: [facility] MAIN OR;  Service: Urology   • WISDOM TOOTH EXTRACTION        General Information     Row Name 03/29/23 1123          Physical Therapy Time and Intention    Document Type re-evaluation  -     Mode of Treatment individual therapy;physical therapy  -     Row Name 03/29/23 1123          General Information    Patient Profile Reviewed yes  -     Existing Precautions/Restrictions fall;brace worn when out of bed  L Darco shoe OOB  -     Barriers to Rehab medically complex  -     Row Name 03/29/23 1123          Cognition    Orientation Status (Cognition) oriented x 4  -     Row Name 03/29/23 1123          Safety Issues, Functional Mobility    Impairments Affecting Function (Mobility) balance;pain;range of motion (ROM);strength;endurance/activity tolerance;shortness of breath  -           User Key  (r) = Recorded By, (t) = Taken By, (c) = Cosigned By    Initials Name Provider Type     China Sandra PT Physical Therapist               Mobility     Row Name 03/29/23 1123          Bed Mobility    Supine-Sit [place] (Bed Mobility) not tested  -     Sit-Supine [place] (Bed Mobility) not tested  -     Comment, (Bed Mobility) NT - [facility]  -     Row Name 03/29/23 1123          Sit-Stand Transfer    Sit-Stand [place] (Transfers) verbal cues;nonverbal cues (demo/gesture);minimum assist (75% patient effort)  -     Assistive Device (Sit-Stand Transfers) walker, front-wheeled  -     Comment, (Sit-Stand Transfer) Required 2 attempts - posterior LOB initially back onto chair  -     Row Name 03/29/23 1123          Gait/Stairs (Locomotion)    [place] Level (Gait)  verbal cues;nonverbal cues (demo/gesture);minimum assist (75% patient effort);contact guard  -     Assistive Device (Gait) walker, front-wheeled  -     Distance in Feet (Gait) 15ft  -     Deviations/Abnormal Patterns (Gait) evens decreased;gait speed decreased;stride length decreased;antalgic  -     Bilateral Gait Deviations forward flexed posture  -     Left Sided Gait Deviations weight shift ability decreased  -     Comment, (Gait/Stairs) Slow pace, forward flexed posture, maintained B knee flexion with gait, gait distance limited 2/2 pain/SOA/fatigue  -     Row Name 03/29/23 1123          Mobility    Extremity Weight-bearing Status left lower extremity  -     Left Lower Extremity (Weight-bearing Status) weight-bearing as tolerated (WBAT);other (see comments)  Heel WBAT  -           User Key  (r) = Recorded By, (t) = Taken By, (c) = Cosigned By    Initials Name Provider Type     China Sandra, PT Physical Therapist               Obj/Interventions     [facility] Name 03/29/23 1127          Range of Motion Comprehensive    General Range of Motion lower extremity range of motion deficits identified  -     Comment, General Range of Motion Impaired B knee extension with gait  -     Row Name 03/29/23 1127          Strength Comprehensive (MMT)    General Manual Muscle Testing (MMT) Assessment lower extremity strength deficits identified  -     Comment, General Manual Muscle Testing (MMT) Assessment Generalized weakness, BLE grossly 3+/5  -     Row Name 03/29/23 1127          Balance    Balance Assessment sitting static balance;sitting dynamic balance;standing static balance;standing dynamic balance  -     Static Sitting Balance standby assist  -     Dynamic Sitting Balance standby assist  -     Position, Sitting Balance unsupported;sitting edge of bed  -     Static Standing Balance contact guard;verbal cues  -     Dynamic Standing Balance minimal assist;verbal cues  -      Position/Device Used, Standing Balance supported;walker, front-wheeled  -     Balance Interventions sitting;standing;sit to stand;supported;static;dynamic  -[facility] Name 03/29/23 1127          Sensory Assessment (Somatosensory)    Sensory Assessment (Somatosensory) LE sensation intact  -           User Key  (r) = Recorded By, (t) = Taken By, (c) = Cosigned By    Initials Name Provider Type     China Sandra PT Physical Therapist               Goals/Plan     [facility] Name 03/29/23 1134          Bed Mobility Goal 1 (PT)    Activity/Assistive Device (Bed Mobility Goal 1, PT) bed mobility activities, all  -     [place] Level/Cues Needed (Bed Mobility Goal 1, PT) standby assist  -     Time Frame (Bed Mobility Goal 1, PT) 1 week  -     Progress/Outcomes (Bed Mobility Goal 1, PT) goal ongoing  [facility] Name 03/29/23 1134          Transfer Goal 1 (PT)    Activity/Assistive Device (Transfer Goal 1, PT) transfers, all;walker, rolling  -     [place] Level/Cues Needed (Transfer Goal 1, PT) standby assist  -     Time Frame (Transfer Goal 1, PT) 1 week  -     Progress/Outcome (Transfer Goal 1, PT) goal ongoing  [facility] Name 03/29/23 1134          Gait Training Goal 1 (PT)    Activity/Assistive Device (Gait Training Goal 1, PT) gait (walking locomotion);walker, rolling  -     [place] Level (Gait Training Goal 1, PT) standby assist  -     Time Frame (Gait Training Goal 1, PT) 1 week  -     Progress/Outcome (Gait Training Goal 1, PT) goal ongoing  [facility] Name 03/29/23 1134          Therapy Assessment/Plan (PT)    Planned Therapy Interventions (PT) balance training;bed mobility training;gait training;home exercise program;patient/family education;strengthening;ROM (range of motion);transfer training  -           User Key  (r) = Recorded By, (t) = Taken By, (c) = Cosigned By    Initials Name Provider Type     China Sandra PT Physical Therapist               Clinical  Impression     Row Name 03/29/23 1128          Pain    Pretreatment Pain Rating 8/10  [facility]     Pain Location - Side/Orientation Left  -     Pain Location - foot  -     Pain Intervention(s) Repositioned;Ambulation/increased activity;Rest  -     Row Name 03/29/23 1128          Plan of Care Review    Plan of Care Reviewed With patient  [facility]     Progress improving  -     Outcome Evaluation Pt seen for PT re-eval this PM. S/p LLE arteriogram, angioplasty, and L foot debridement yesterday. Pt remains heel WBAT with Darco shoe donned when OOB, noted plans for wound vac to be reapplied today. Pt sitting [facility] on arrival and stood with min A x1 - 2 attempts to complete d/t posterior LOB back into chair initially. Ambulated 15ft with rwx and CGA, very slow pace, forward flexed posture, and maintained B knee flexion throughout. Pt reports he had just started OPPT prior to admission to work on improving BLE strength and balance - BL gait impairments. Gait distance limited 2/2 SOA, pain, and fatigue - pt requesting return to chair. Pt tolerated LE exercises, left with needs met. PT will continue to follow to progress mobility as tolerated. Pt adamantly refuses SNF, stating he plans to go home with his sister with HHPT vs OPPT. Pt reports plans to move to [place] in August to live with his daughter who is a nurse and able to assist him as needed. Not interest in SNF prior to DC home.  [facility]     Row Name 03/29/23 1128          Vital Signs    O2 Delivery Pre Treatment room air  -     O2 Delivery Intra Treatment room air  -     O2 Delivery Post Treatment room air  [facility]     Row Name 03/29/23 1128          Positioning and Restraints    Pre-Treatment Position sitting in chair/recliner  [facility]     Post Treatment Position chair  -     In Chair notified [facility];reclined;call light within reach;encouraged to call for assist;exit alarm on;with [facility]  -           User Key  (r) = Recorded By, (t) = Taken By, (c) = Cosigned By    Initials Name  Provider Type     China Sandra PT Physical Therapist               Outcome Measures     Row Name 03/29/23 1135          How much help from another person do you currently need...    Turning from your back to your side while in flat bed without using bedrails? 3  -BH     Moving from lying on back to sitting on the side of a flat bed without bedrails? 3  -BH     Moving to and from a bed to a chair (including a wheelchair)? 3  -BH     Standing up from a chair using your arms (e.g., wheelchair, bedside chair)? 3  -BH     Climbing 3-5 steps with a railing? 2  -BH     To walk in hospital room? 2  -BH     AM-PAC 6 Clicks Score (PT) 16  -BH     Highest level of mobility 5 --> Static standing  -     Row Name 03/29/23 1135          Functional Assessment    Outcome Measure Options AM-PAC 6 Clicks Basic Mobility (PT)  -           User Key  (r) = Recorded By, (t) = Taken By, (c) = Cosigned By    Initials Name Provider Type     China Sandra PT Physical Therapist                             Physical Therapy Education     Title: PT OT SLP Therapies (Done)     Topic: Physical Therapy (Done)     Point: Mobility training (Done)     Learning Progress Summary           Patient Acceptance, E,TB,D, VU,NR by  at 3/29/2023 1135    Acceptance, E,TB,D, VU,NR by  at 3/27/2023 1638                   Point: Home exercise program (Done)     Learning Progress Summary           Patient Acceptance, E,TB,D, VU,NR by  at 3/29/2023 1135    Acceptance, E,TB,D, VU,NR by  at 3/27/2023 1638                   Point: Body mechanics (Done)     Learning Progress Summary           Patient Acceptance, E,TB,D, VU,NR by  at 3/29/2023 1135    Acceptance, E,TB,D, VU,NR by  at 3/27/2023 1638                   Point: Precautions (Done)     Learning Progress Summary           Patient Acceptance, E,TB,D, VU,NR by  at 3/29/2023 1135    Acceptance, E,TB,D, VU,NR by  at 3/27/2023 1638                               User Key     Initials  Effective Dates Name Provider Type Discipline     06/16/21 -  Christelle Gomes, PT Physical Therapist PT     04/08/22 -  China Sandra, PT Physical Therapist PT              PT Recommendation and Plan  Planned Therapy Interventions (PT): balance training, bed mobility training, gait training, home exercise program, patient/family education, strengthening, ROM (range of motion), transfer training  Plan of Care Reviewed With: patient  Progress: improving  Outcome Evaluation: Pt seen for PT re-eval this PM. S/p LLE arteriogram, angioplasty, and L foot debridement yesterday. Pt remains heel WBAT with Darco shoe donned when OOB, noted plans for wound vac to be reapplied today. Pt sitting UI on arrival and stood with min A x1 - 2 attempts to complete d/t posterior LOB back into chair initially. Ambulated 15ft with rwx and CGA, very slow pace, forward flexed posture, and maintained B knee flexion throughout. Pt reports he had just started OPPT prior to admission to work on improving BLE strength and balance - BL gait impairments. Gait distance limited 2/2 SOA, pain, and fatigue - pt requesting return to chair. Pt tolerated LE exercises, left with needs met. PT will continue to follow to progress mobility as tolerated. Pt adamantly refuses SNF, stating he plans to go home with his sister with HHPT vs OPPT. Pt reports plans to move to [place] in August to live with his daughter who is a nurse and able to assist him as needed. Not interest in SNF prior to DC home.     Time Calculation:    PT Charges     Row Name 03/29/23 1135             Time Calculation    Start Time 1056  -      Stop Time 1119  -      Time Calculation (min) 23 min  -      PT Received On 03/29/23  -      PT - Next Appointment 03/30/23  -      PT Goal Re-Cert Due Date 04/05/23  -         Time Calculation- PT    Total Timed Code Minutes- PT 20 minute(s)  -         Timed Charges    24273 - PT Therapeutic Exercise Minutes 5  -       65967 - Gait Training Minutes  10  -      86940 - PT Therapeutic Activity Minutes 5  -BH         Untimed Charges    PT Eval/Re-eval Minutes 3  -BH         Total Minutes    Timed Charges Total Minutes 20  -BH      Untimed Charges Total Minutes 3  -BH       Total Minutes 23  -BH            User Key  (r) = Recorded By, (t) = Taken By, (c) = Cosigned By    Initials Name Provider Type     China Sandra, PT Physical Therapist              Therapy Charges for Today     Code Description Service Date Service Provider Modifiers Qty    [id number]  GAIT TRAINING EA 15 MIN 3/29/2023 China Sandra, PT GP 1    [id number]  PT RE-EVAL ESTABLISHED PLAN 2 3/29/2023 China Sandra, PT GP 1          PT G-Codes  Outcome Measure Options: AM-PAC 6 Clicks Basic Mobility (PT)  AM-PAC 6 Clicks Score (PT): 16  PT Discharge Summary  Anticipated Discharge Disposition (PT): home, home with home health, skilled nursing facility, home with 24/7 care    China Sandra, PT  3/29/2023

## 2023-03-29 NOTE — PROGRESS NOTES
FootName: Tomy Manjarrez ADMIT: 3/22/2023   : 1950  PCP: Zoya Claros PA    MRN: [id number] LOS: 7 days   AGE/SEX: 73 y.o. male  ROOM: [address]    [place] [employer]    Chief Complaint   Patient presents with   • Foot Pain   • Numbness     CC: Foot I&D follow-up peripheral arterial disease follow-up  Subjective     73 y.o. male patient resting comfortably.  Mild foot discomfort    Review of Systems some pain of the foot    Objective     Scheduled Medications:   allopurinol, 100 mg, Oral, Q PM  amitriptyline, 25 mg, Oral, Nightly  amLODIPine, 10 mg, Oral, QAM  atorvastatin, 40 mg, Oral, Nightly  DULoxetine, 60 mg, Oral, Daily  enoxaparin, 40 mg, Subcutaneous, Daily  fenofibrate, 145 mg, Oral, Daily  ferrous sulfate, 325 mg, Oral, Daily With Lunch  finasteride, 5 mg, Oral, Daily  losartan, 100 mg, Oral, Q24H   And  hydroCHLOROthiazide, 25 mg, Oral, Q24H  insulin glargine, 30 Units, Subcutaneous, Nightly  insulin lispro, 0-14 Units, Subcutaneous, TID AC  ipratropium-albuterol, 3 mL, Nebulization, Q8H - RT  levoFLOXacin, 750 mg, Oral, Every Other Day  nebivolol, 10 mg, Oral, QAM  pantoprazole, 40 mg, Oral, Q AM  pregabalin, 200 mg, Oral, Daily  sodium chloride, 10 mL, Intravenous, Q12H  tamsulosin, 0.4 mg, Oral, Daily  tiZANidine, 4 mg, Oral, Nightly  traZODone, 100 mg, Oral, Nightly        Active Infusions:  lactated ringers, 9 mL/hr, Last Rate: 9 mL/hr (23 1302)  sodium chloride, 100 mL/hr, Last Rate: 100 mL/hr (23 2250)  sodium chloride, 9 mL/hr        As Needed Medications:  •  acetaminophen  •  dextrose  •  dextrose  •  glucagon (human recombinant)  •  HYDROcodone-acetaminophen  •  HYDROmorphone **AND** naloxone  •  nitroglycerin  •  ondansetron **OR** ondansetron  •  sodium chloride  •  sodium chloride  •  sodium chloride  •  sodium hypochlorite (DAKIN'S 1/8 Strength) 0.0625% topical solution (VERAFLO)    Vital Signs  Vital Signs   Patient Vitals for  the past 24 hrs:   BP Temp Temp src Pulse Resp SpO2 Weight   03/29/23 0753 -- -- -- 68 18 -- --   03/29/23 0747 -- -- -- 63 18 96 % --   03/29/23 0711 145/71 -- Oral 61 18 95 % --   03/29/23 0629 139/66 -- -- 64 -- -- --   03/29/23 0500 -- -- -- -- -- -- 122 kg (269 lb 13.5 oz)   03/29/23 0420 123/65 -- -- 63 -- 97 % --   03/28/23 2300 127/68 98.3 °F (36.8 °C) Oral 64 18 -- --   03/28/23 2256 -- -- -- -- -- 94 % --   03/28/23 2133 122/61 -- -- 67 -- -- --   03/28/23 1900 118/70 98.7 °F (37.1 °C) Oral 66 18 97 % --   03/28/23 1700 141/65 -- -- 68 18 97 % --   03/28/23 1645 136/56 98.7 °F (37.1 °C) Oral 68 18 96 % --   03/28/23 1630 131/54 -- -- 67 18 97 % --   03/28/23 1615 110/45 -- -- 66 18 95 % --   03/28/23 1600 109/42 -- -- 65 16 96 % --   03/28/23 1545 115/42 -- -- 67 16 96 % --   03/28/23 1530 119/51 -- -- 64 16 94 % --   03/28/23 1520 112/50 -- -- 65 16 94 % --   03/28/23 1505 127/83 -- -- 73 16 93 % --   03/28/23 1500 103/76 -- -- 72 16 93 % --   03/28/23 1455 113/55 -- -- 70 14 95 % --   03/28/23 1452 113/55 98.6 °F (37 °C) Oral 71 14 95 % --   03/28/23 0924 137/58 99.6 °F (37.6 °C) Oral 68 16 100 % --     I/O:  I/O last 3 completed shifts:  In: 1660 [P.O.:360; I.V.:1300]  Out: 6700 [Urine:6700]    Physical Exam:  Physical Exam   Foot wound with viable tissue but somewhat anergic.  No pus visible or expressible.  Results Review:     CBC    Results from last 7 days   Lab Units 03/29/23  0550 03/28/23  0457 03/26/23  0455 03/25/23  0523 03/24/23  0527 03/23/23  0453 03/22/23  1830   WBC 10*3/mm3 11.53* 10.80 10.15 12.02* 15.29* 15.23* 15.53*   HEMOGLOBIN g/dL 10.8* 11.3* 10.6* 10.9* 10.0* 11.0* 11.5*   PLATELETS 10*3/mm3 275 259 214 193 167 166 176     BMP   Results from last 7 days   Lab Units 03/29/23  0550 03/28/23  0457 03/27/23  0430 03/26/23  0455 03/25/23  0523 03/24/23  0527 03/23/23  0453 03/22/23  2335   SODIUM mmol/L 139 138  --  135* 137 137 134* 134*   POTASSIUM mmol/L 4.2 4.5  --  4.1 4.3 4.0  4.1 4.2   CHLORIDE mmol/L 105 105  --  105 104 104 100 100   CO2 mmol/L 26.4 26.5  --  22.0 22.0 23.0 25.0 20.8*   BUN mg/dL 25* 30*  --  47* 43* 44* 46* 48*   CREATININE mg/dL 1.94* 2.16* 2.11* 2.30* 2.25* 2.53* 2.72* 2.96*   GLUCOSE mg/dL 124* 126*  --  176* 235* 193* 196* 218*   MAGNESIUM mg/dL 1.8 1.8  --   --   --   --   --   --        Assessment & Plan     Assessment & Plan      Cellulitis of left foot    Type 2 diabetes mellitus with hyperglycemia, with long-term current use of insulin (HCC)    Hypertension    Essential hypertension    Stage 3b chronic kidney disease (HCC)    BPH with urinary obstruction    Clear cell carcinoma of kidney (HCC)    Hyponatremia    MARTIN (acute kidney injury) (HCC)    Diarrhea        73 y.o. male left diabetic foot infection with mal perforans ulcer.  Postop day 5 status post I&D of foot wound.  Successful intervention of the popliteal and partial intervention at the dorsalis pedis yesterday.  Awaiting replacement of wound VAC today.  We will leave on till Friday.  Avoid ACEs.  Agree with 6 weeks of antibiotics.  We will ask CCP to get patient ready for discharge home with home VAC and home antibiotics.  Still hopeful to salvage the foot without toe amputation.  If things do not progress positively he may still need first and possible second toe amputations.  He is aware.      Baltazar Oh MD  03/29/23  08:04 EDT    Please call my office with any question: [phone]    Active Hospital Problems    Diagnosis  POA   • **Cellulitis of left foot [L03.116]  Yes   • MARTIN (acute kidney injury) (HCC) [N17.9]  Yes   • Diarrhea [R19.7]  No   • Hyponatremia [E87.1]  Yes   • Clear cell carcinoma of kidney (HCC) [C64.9]  Yes   • BPH with urinary obstruction [N40.1, N13.8]  Yes   • Stage 3b chronic kidney disease (HCC) [N18.32]  Yes   • Essential hypertension [I10]  Yes   • Type 2 diabetes mellitus with hyperglycemia, with long-term current use of insulin (HCC) [E11.65, Z79.4]  Not  Applicable   • Hypertension [I10]  Yes      Resolved Hospital Problems   No resolved problems to display.

## 2023-03-30 ENCOUNTER — HOME HEALTH ADMISSION (OUTPATIENT)
Dept: HOME HEALTH SERVICES | Facility: HOME HEALTHCARE | Age: 73
End: 2023-03-30
Payer: MEDICARE

## 2023-03-30 PROBLEM — N17.9 AKI (ACUTE KIDNEY INJURY): Status: RESOLVED | Noted: 2023-03-27 | Resolved: 2023-03-30

## 2023-03-30 PROBLEM — E87.1 HYPONATREMIA: Status: RESOLVED | Noted: 2023-03-22 | Resolved: 2023-03-30

## 2023-03-30 LAB
ANION GAP SERPL CALCULATED.3IONS-SCNC: 7 MMOL/L (ref 5–15)
BUN SERPL-MCNC: 28 MG/DL (ref 8–23)
BUN/CREAT SERPL: 14 (ref 7–25)
CALCIUM SPEC-SCNC: 9.4 MG/DL (ref 8.6–10.5)
CHLORIDE SERPL-SCNC: 104 MMOL/L (ref 98–107)
CO2 SERPL-SCNC: 28 MMOL/L (ref 22–29)
CREAT SERPL-MCNC: 2 MG/DL (ref 0.76–1.27)
DEPRECATED RDW RBC AUTO: 41.6 FL (ref 37–54)
EGFRCR SERPLBLD CKD-EPI 2021: 34.4 ML/MIN/1.73
ERYTHROCYTE [DISTWIDTH] IN BLOOD BY AUTOMATED COUNT: 13.5 % (ref 12.3–15.4)
GLUCOSE BLDC GLUCOMTR-MCNC: 115 MG/DL (ref 70–130)
GLUCOSE BLDC GLUCOMTR-MCNC: 161 MG/DL (ref 70–130)
GLUCOSE BLDC GLUCOMTR-MCNC: 167 MG/DL (ref 70–130)
GLUCOSE BLDC GLUCOMTR-MCNC: 217 MG/DL (ref 70–130)
GLUCOSE SERPL-MCNC: 128 MG/DL (ref 65–99)
HCT VFR BLD AUTO: 32.1 % (ref 37.5–51)
HGB BLD-MCNC: 11 G/DL (ref 13–17.7)
MCH RBC QN AUTO: 28.9 PG (ref 26.6–33)
MCHC RBC AUTO-ENTMCNC: 34.3 G/DL (ref 31.5–35.7)
MCV RBC AUTO: 84.3 FL (ref 79–97)
PLATELET # BLD AUTO: 318 10*3/MM3 (ref 140–450)
PMV BLD AUTO: 11.7 FL (ref 6–12)
POTASSIUM SERPL-SCNC: 4.3 MMOL/L (ref 3.5–5.2)
RBC # BLD AUTO: 3.81 10*6/MM3 (ref 4.14–5.8)
SODIUM SERPL-SCNC: 139 MMOL/L (ref 136–145)
WBC NRBC COR # BLD: 10.08 10*3/MM3 (ref 3.4–10.8)

## 2023-03-30 PROCEDURE — 94799 UNLISTED PULMONARY SVC/PX: CPT

## 2023-03-30 PROCEDURE — 85027 COMPLETE CBC AUTOMATED: CPT | Performed by: HOSPITALIST

## 2023-03-30 PROCEDURE — 94664 DEMO&/EVAL PT USE INHALER: CPT

## 2023-03-30 PROCEDURE — 97530 THERAPEUTIC ACTIVITIES: CPT

## 2023-03-30 PROCEDURE — 80048 BASIC METABOLIC PNL TOTAL CA: CPT | Performed by: HOSPITALIST

## 2023-03-30 PROCEDURE — 63710000001 INSULIN LISPRO (HUMAN) PER 5 UNITS: Performed by: SURGERY

## 2023-03-30 PROCEDURE — 94761 N-INVAS EAR/PLS OXIMETRY MLT: CPT

## 2023-03-30 PROCEDURE — 99232 SBSQ HOSP IP/OBS MODERATE 35: CPT | Performed by: STUDENT IN AN ORGANIZED HEALTH CARE EDUCATION/TRAINING PROGRAM

## 2023-03-30 PROCEDURE — 82962 GLUCOSE BLOOD TEST: CPT

## 2023-03-30 PROCEDURE — 25010000002 ENOXAPARIN PER 10 MG: Performed by: SURGERY

## 2023-03-30 RX ADMIN — ATORVASTATIN CALCIUM 40 MG: 20 TABLET, FILM COATED ORAL at 19:49

## 2023-03-30 RX ADMIN — TRAZODONE HYDROCHLORIDE 100 MG: 100 TABLET ORAL at 21:45

## 2023-03-30 RX ADMIN — TAMSULOSIN HYDROCHLORIDE 0.4 MG: 0.4 CAPSULE ORAL at 08:49

## 2023-03-30 RX ADMIN — AMITRIPTYLINE HYDROCHLORIDE 25 MG: 25 TABLET, FILM COATED ORAL at 19:49

## 2023-03-30 RX ADMIN — Medication 10 ML: at 19:49

## 2023-03-30 RX ADMIN — INSULIN LISPRO 3 UNITS: 100 INJECTION, SOLUTION INTRAVENOUS; SUBCUTANEOUS at 08:50

## 2023-03-30 RX ADMIN — TIZANIDINE 4 MG: 4 TABLET ORAL at 21:45

## 2023-03-30 RX ADMIN — DULOXETINE HYDROCHLORIDE 60 MG: 60 CAPSULE, DELAYED RELEASE ORAL at 08:54

## 2023-03-30 RX ADMIN — LOSARTAN POTASSIUM 100 MG: 100 TABLET, FILM COATED ORAL at 08:49

## 2023-03-30 RX ADMIN — FINASTERIDE 5 MG: 5 TABLET, FILM COATED ORAL at 08:49

## 2023-03-30 RX ADMIN — INSULIN LISPRO 3 UNITS: 100 INJECTION, SOLUTION INTRAVENOUS; SUBCUTANEOUS at 12:13

## 2023-03-30 RX ADMIN — PREGABALIN 200 MG: 100 CAPSULE ORAL at 08:49

## 2023-03-30 RX ADMIN — IPRATROPIUM BROMIDE AND ALBUTEROL SULFATE 3 ML: .5; 2.5 SOLUTION RESPIRATORY (INHALATION) at 07:06

## 2023-03-30 RX ADMIN — FERROUS SULFATE TAB 325 MG (65 MG ELEMENTAL FE) 325 MG: 325 (65 FE) TAB at 12:13

## 2023-03-30 RX ADMIN — LEVOFLOXACIN 750 MG: 500 TABLET, FILM COATED ORAL at 08:49

## 2023-03-30 RX ADMIN — HYDROCHLOROTHIAZIDE 25 MG: 25 TABLET ORAL at 08:49

## 2023-03-30 RX ADMIN — FENOFIBRATE 145 MG: 145 TABLET, FILM COATED ORAL at 08:53

## 2023-03-30 RX ADMIN — NEBIVOLOL 10 MG: 10 TABLET ORAL at 06:12

## 2023-03-30 RX ADMIN — Medication 10 ML: at 09:00

## 2023-03-30 RX ADMIN — ALLOPURINOL 100 MG: 100 TABLET ORAL at 17:41

## 2023-03-30 RX ADMIN — INSULIN GLARGINE-YFGN 30 UNITS: 100 INJECTION, SOLUTION SUBCUTANEOUS at 21:52

## 2023-03-30 RX ADMIN — AMLODIPINE BESYLATE 10 MG: 10 TABLET ORAL at 06:12

## 2023-03-30 RX ADMIN — ENOXAPARIN SODIUM 40 MG: 100 INJECTION SUBCUTANEOUS at 08:50

## 2023-03-30 RX ADMIN — PANTOPRAZOLE SODIUM 40 MG: 40 TABLET, DELAYED RELEASE ORAL at 06:12

## 2023-03-30 RX ADMIN — HYDROCODONE BITARTRATE AND ACETAMINOPHEN 1 TABLET: 7.5; 325 TABLET ORAL at 08:49

## 2023-03-30 RX ADMIN — HYDROCODONE BITARTRATE AND ACETAMINOPHEN 1 TABLET: 7.5; 325 TABLET ORAL at 19:47

## 2023-03-30 NOTE — PLAN OF CARE
Goal Outcome Evaluation:  Plan of Care Reviewed With: patient        Progress: no change  Outcome Evaluation: VSS. Alert and oriented x4 with forgetfulness. Pt c/o LLE pain. Treated x1 with prn NORCO. Wound vac in place to LLE. Sandoval catheter to bedside drainage. Plans for voiding trial this AM. Will continue to provide supportive care.

## 2023-03-30 NOTE — PROGRESS NOTES
Name: Tomy Manjarrez ADMIT: 3/22/2023   : 1950  PCP: Zoya Claros PA    MRN: [id number] LOS: 8 days   AGE/SEX: 73 y.o. male  ROOM: [facility]     Subjective   Subjective   Doing pretty well today.  Denies any complaints.    Review of Systems     Objective   Objective   Vital Signs  Temp:  [97.8 °F (36.6 °C)-98.9 °F (37.2 °C)] 98.9 °F (37.2 °C)  Heart Rate:  [65-76] 65  Resp:  [16-18] 16  BP: (111-144)/(58-80) 134/73  SpO2:  [93 %-100 %] 98 %  on   ;   Device (Oxygen Therapy): room air  Body mass index is 34 kg/m².  Physical Exam  Vitals reviewed.   Constitutional:       General: He is not in acute distress.     Appearance: He is well-developed.   HENT:      Head: Normocephalic and atraumatic.   Eyes:      General: No scleral icterus.  Neck:      Vascular: No JVD.   Cardiovascular:      Rate and Rhythm: Normal rate and regular rhythm.      Heart sounds: No murmur heard.  Pulmonary:      Effort: Pulmonary effort is normal. No respiratory distress.      Breath sounds: Normal breath sounds. No wheezing.   Abdominal:      General: There is no distension.      Palpations: Abdomen is soft.      Tenderness: There is no abdominal tenderness.   Musculoskeletal:      Right lower leg: No edema.      Left lower leg: No edema.      Comments: Left foot wound VAC in place   Skin:     General: Skin is warm and dry.      Findings: No rash.   Neurological:      Mental Status: He is alert and oriented to person, place, and time.   Psychiatric:         Mood and Affect: Mood normal.       Results Review     I reviewed the patient's new clinical results.  Results from last 7 days   Lab Units 23  0438 23  0550 23  0455   WBC 10*3/mm3 10.08 11.53* 10.80 10.15   HEMOGLOBIN g/dL 11.0* 10.8* 11.3* 10.6*   PLATELETS 10*3/mm3 318 275 259 214     Results from last 7 days   Lab Units 23  0438 23  0550 23  0457 23  0430 23  0455   SODIUM mmol/L 139 139 138  --   135*   POTASSIUM mmol/L 4.3 4.2 4.5  --  4.1   CHLORIDE mmol/L 104 105 105  --  105   CO2 mmol/L 28.0 26.4 26.5  --  22.0   BUN mg/dL 28* 25* 30*  --  47*   CREATININE mg/dL 2.00* 1.94* 2.16* 2.11* 2.30*   GLUCOSE mg/dL 128* 124* 126*  --  176*   EGFR mL/min/1.73 34.4* 35.7* 31.3* 32.2* 29.1*     Results from last 7 days   Lab Units 03/29/23  0550 03/28/23  0457   ALBUMIN g/dL 3.3* 3.1*   BILIRUBIN mg/dL 0.5 0.6   ALK PHOS U/L 148* 157*   AST (SGOT) U/L 30 33   ALT (SGPT) U/L 18 22     Results from last 7 days   Lab Units 03/30/23  0438 03/29/23  0550 03/28/23  0457 03/26/23  0455   CALCIUM mg/dL 9.4 9.2 9.4 9.2   ALBUMIN g/dL  --  3.3* 3.1*  --    MAGNESIUM mg/dL  --  1.8 1.8  --          Hemoglobin A1C   Date/Time Value Ref Range Status   03/28/2023 0457 7.80 (H) 4.80 - 5.60 % Final     Glucose   Date/Time Value Ref Range Status   03/30/2023 1051 161 (H) 70 - 130 mg/dL Final     Comment:     Meter: [id number] : [id number] Raf Cassidy NA   03/30/2023 0627 167 (H) 70 - 130 mg/dL Final     Comment:     Meter: [id number] : [id number] Arthur Blair NA   03/29/2023 2045 165 (H) 70 - 130 mg/dL Final     Comment:     Meter: [id number] : [id number] Arthur Blair NA   03/29/2023 1639 125 70 - 130 mg/dL Final     Comment:     Meter: [id number] : [id number] Raf Cassidy NA   03/29/2023 1110 154 (H) 70 - 130 mg/dL Final     Comment:     Meter: [id number] : [id number] Robbie HILLS   03/29/2023 0619 129 70 - 130 mg/dL Final     Comment:     Meter: [id number] : [id number] Arthur Rossy REINIER   03/28/2023 2112 204 (H) 70 - 130 mg/dL Final     Comment:     Meter: [id number] : [id number] Arthur HILLS       No radiology results for the last day  I have personally reviewed all medications:  Scheduled Medications  allopurinol, 100 mg, Oral, Q PM  amitriptyline, 25 mg, Oral, Nightly  amLODIPine, 10 mg, Oral, QAM  atorvastatin, 40 mg, Oral, Nightly  DULoxetine, 60 mg, Oral, Daily  enoxaparin, 40 mg,  Subcutaneous, Daily  fenofibrate, 145 mg, Oral, Daily  ferrous sulfate, 325 mg, Oral, Daily With Lunch  finasteride, 5 mg, Oral, Daily  losartan, 100 mg, Oral, Q24H   And  hydroCHLOROthiazide, 25 mg, Oral, Q24H  insulin glargine, 30 Units, Subcutaneous, Nightly  insulin lispro, 0-14 Units, Subcutaneous, TID AC  levoFLOXacin, 750 mg, Oral, Every Other Day  nebivolol, 10 mg, Oral, QAM  pantoprazole, 40 mg, Oral, Q AM  pregabalin, 200 mg, Oral, Daily  sodium chloride, 10 mL, Intravenous, Q12H  tamsulosin, 0.4 mg, Oral, Daily  tiZANidine, 4 mg, Oral, Nightly  traZODone, 100 mg, Oral, Nightly    Infusions  lactated ringers, 9 mL/hr, Last Rate: 9 mL/hr (03/28/23 1302)  sodium chloride, 9 mL/hr    Diet  Diet: Cardiac Diets, Diabetic Diets; Healthy Heart (2-3 Na+); Consistent Carbohydrate; Texture: Regular Texture (IDDSI 7); Fluid Consistency: Thin (IDDSI 0)    I have personally reviewed:  [x]  Laboratory   [x]  Microbiology   [x]  Radiology   [x]  EKG/Telemetry  [x]  Cardiology/Vascular   []  Pathology    []  Records       Assessment/Plan     Active Hospital Problems    Diagnosis  POA   • **Cellulitis of left foot [L03.116]  Yes   • Diarrhea [R19.7]  No   • Clear cell carcinoma of kidney (HCC) [C64.9]  Yes   • BPH with urinary obstruction [N40.1, N13.8]  Yes   • Stage 3b chronic kidney disease (HCC) [N18.32]  Yes   • Essential hypertension [I10]  Yes   • Type 2 diabetes mellitus with hyperglycemia, with long-term current use of insulin (HCC) [E11.65, Z79.4]  Not Applicable   • Hypertension [I10]  Yes      Resolved Hospital Problems    Diagnosis Date Resolved POA   • MARTIN (acute kidney injury) (HCC) [N17.9] 03/30/2023 Yes   • Hyponatremia [E87.1] 03/30/2023 Yes       73 y.o. male admitted with infected DFU and Cellulitis of left foot.    Left DFU:  s/p I&D on 3/24 by Dr. Oh, culture growing Strep and E.coli  -Plan for PO LQN Q48h per ID x 6 weeks of abx for possible osteo (stop date 5/5/23)  -Wound VAC in place now.  Arranging home VAC. Vascular planning dressing change and re-eval tomorrow and possible dc     PVD: s/p A-gram 3/28 with angioplasty to the left anterior tibial/dorsalis pedis and left popliteal     DM2 with peripheral neuropathy  - A1c 7.8, sugars acceptable on current basal/bolus regimen  -Continue Lyrica     MARTIN/CKD3b: Cr stable at baseline now.      Anemia of CKD: Hgb stable     HTN: BPs acceptable on amlodipine, losartan, HCTZ, Bystolic     BPH  Urinary retention:  Voiding trial in progress. Replace lee if unable to urinate.   - Continue tamsulosin and finasteride    Diarrhea resolved.        · Lovenox for DVT prophylaxis.  · Disposition: Home with home health/wound VAC when cleared by vascular.  Likely tomorrow    Expected Discharge  Expected Discharge Date and Time     Expected Discharge Date Expected Discharge Time    Mar 31, 2023             Baltazar Velasco MD  [facility]  03/30/23  15:41 EDT

## 2023-03-30 NOTE — THERAPY TREATMENT NOTE
Patient Name: Tomy Manjarrez  : 1950    MRN: [id number]                              Today's Date: 3/30/2023       Admit Date: 3/22/2023    Visit Dx:     ICD-10-CM ICD-9-CM   1. Cellulitis of left foot  L03.116 682.7   2. Diabetic foot infection (HCC)  E11.628 250.80    L08.9 686.9     Patient Active Problem List   Diagnosis   • Left renal mass   • Type 2 diabetes mellitus with hyperglycemia, with long-term current use of insulin (HCC)   • Hypertension   • Diabetic neuropathy (HCC)   • Alcohol use   • Essential hypertension   • Chronic renal insufficiency, stage III (moderate) (HCC)   • Prostatocystitis   • Hyperglycemia due to type 2 diabetes mellitus (HCC)   • Diabetic peripheral neuropathy associated with type 2 diabetes mellitus (HCC)   • Stage 3b chronic kidney disease (HCC)   • Mixed hyperlipidemia   • Diabetic peripheral neuropathy (HCC)   • Stage 3b chronic kidney disease (HCC)   • Diabetic peripheral neuropathy (HCC)   • Essential hypertension   • Type 2 diabetes mellitus with hyperglycemia, with long-term current use of insulin (HCC)   • BPH with urinary obstruction   • Clear cell carcinoma of kidney (HCC)   • Cellulitis of left foot   • Hyponatremia   • MARTIN (acute kidney injury) (HCC)   • Diarrhea     Past Medical History:   Diagnosis Date   • Arthritis    • BPH (benign prostatic hyperplasia)    • Chronic back pain    • Chronic kidney disease (CKD), stage III (moderate) (HCC)    • Diabetes mellitus (HCC)     TYPE 2   • Difficulty urinating     REQUIRING STRAIGHT CATH   • GERD (gastroesophageal reflux disease)    • Gout    • Hyperlipidemia    • Hypertension    • Left kidney mass    • Neuropathy     IN FEET   • Neuropathy in diabetes (HCC)    • Type 2 diabetes mellitus (HCC)      Past Surgical History:   Procedure Laterality Date   • CARDIAC CATHETERIZATION     • COLONOSCOPY     • CYSTOSCOPY TRANSURETHRAL RESECTION OF PROSTATE N/A 2021    Procedure: CYSTOSCOPY TRANSURETHRAL RESECTION OF  PROSTATE TRANSURETHRAL INCISION OF PROSTATE;  Surgeon: Tres Mena MD;  Location: [facility];  Service: Urology;  Laterality: N/A;   • NEPHRECTOMY PARTIAL Left 11/19/2019    Procedure: LEFT PARTIAL NEPHRECTOMY X2, INTRAOPERATIVE RENAL ULTRASONOGRAPHY;  Surgeon: Tres Mena MD;  Location: [facility];  Service: Urology   • WISDOM TOOTH EXTRACTION        General Information     Row Name 03/30/23 1412          Physical Therapy Time and Intention    Document Type therapy note (daily note)  -CS     Mode of Treatment individual therapy;physical therapy  -CS     Row Name 03/30/23 1412          General Information    Patient Profile Reviewed yes  -CS     Existing Precautions/Restrictions fall;brace worn when out of bed  L darco shoe + wound vac  -CS     Row Name 03/30/23 1412          Cognition    Orientation Status (Cognition) oriented x 4  -CS     Row Name 03/30/23 1412          Safety Issues, Functional Mobility    Impairments Affecting Function (Mobility) balance;pain;range of motion (ROM);strength;endurance/activity tolerance;shortness of breath;postural/trunk control  -CS           User Key  (r) = Recorded By, (t) = Taken By, (c) = Cosigned By    Initials Name Provider Type    CS Michelle Sutton, PT Physical Therapist               Mobility     Row Name 03/30/23 1415          Bed Mobility    Comment, (Bed Mobility) NT; UIC  -CS     Row Name 03/30/23 1415          Sit-Stand Transfer    Sit-Stand [place] (Transfers) contact guard;verbal cues  -CS     Assistive Device (Sit-Stand Transfers) walker, front-wheeled  -CS     Comment, (Sit-Stand Transfer) slight LOB posteriorly when initially standing but able to recoer safely with greater assist  -CS     Row Name 03/30/23 1415          Gait/Stairs (Locomotion)    [place] Level (Gait) contact guard;minimum assist (75% patient effort);verbal cues  -CS     Assistive Device (Gait) walker, front-wheeled  -CS     Distance in Feet  (Gait) 75'  -CS     Deviations/Abnormal Patterns (Gait) evens decreased;gait speed decreased;stride length decreased;antalgic  -CS     Bilateral Gait Deviations forward flexed posture  -CS     Left Sided Gait Deviations weight shift ability decreased;decreased knee extension  -CS     Right Sided Gait Deviations decreased knee extension  -CS     Comment, (Gait/Stairs) slow pace; forward flexed posture & sustained B knee flexion while ambulating - pt reports this is baseline & chronic issue  -     Row Name 03/30/23 1415          Mobility    Extremity Weight-bearing Status left lower extremity  -CS     Left Lower Extremity (Weight-bearing Status) weight-bearing as tolerated (WBAT);other (see comments)  heel WBAT  -CS           User Key  (r) = Recorded By, (t) = Taken By, (c) = Cosigned By    Initials Name Provider Type    CS Michelle Sutton, PT Physical Therapist               Obj/Interventions     Row Name 03/30/23 1420          Balance    Balance Assessment sitting static balance;sitting dynamic balance;standing static balance;standing dynamic balance  -CS     Static Sitting Balance modified independence  -CS     Dynamic Sitting Balance modified independence  -CS     Position, Sitting Balance supported;sitting in chair  -CS     Static Standing Balance contact guard  -CS     Dynamic Standing Balance contact guard  -CS     Position/Device Used, Standing Balance supported;walker, front-wheeled  -CS           User Key  (r) = Recorded By, (t) = Taken By, (c) = Cosigned By    Initials Name Provider Type    CS Michelle Sutton, PT Physical Therapist               Goals/Plan    No documentation.                Clinical Impression     Row Name 03/30/23 1421          Pain    Pretreatment Pain Rating 0/10 - no pain  -CS     Posttreatment Pain Rating 0/10 - no pain  -CS     Row Name 03/30/23 1421          Plan of Care Review    Plan of Care Reviewed With patient  -CS     Progress improving  -CS     Outcome Evaluation Pt  received UIC upon arrival and agreeable to PT. Pt completed STS requiring CGA but had a posterior LOB when initially standing requiring greater assist to safely recover. Pt ambulated 75' c RW requiring CGA/min A. Pt demonstrates a forward flexed posture and sustained B knee flexion while ambulating. Pt states this is his baseline and a chronic issue. Pt presents with a slow pace but no LOB. Pt with reports of SOA with activity but O2 sats at 98% on RA following activity. Pt UIC with all needs in reach at end of session. Pt will continue to benefit from skilled PT to address strength, endurance, and functional mobility.  -CS     Row Name 03/30/23 1421          Therapy Assessment/Plan (PT)    Criteria for Skilled Interventions Met (PT) yes;meets criteria  -CS     Therapy Frequency (PT) 6 times/wk  -CS     Row Name 03/30/23 1421          Positioning and Restraints    Pre-Treatment Position sitting in chair/recliner  -CS     Post Treatment Position chair  -CS     In Chair reclined;call light within reach;notified nsg;encouraged to call for assist;exit alarm on  -CS           User Key  (r) = Recorded By, (t) = Taken By, (c) = Cosigned By    Initials Name Provider Type    CS Michelle Sutton, PT Physical Therapist               Outcome Measures     Row Name 03/30/23 1423          How much help from another person do you currently need...    Turning from your back to your side while in flat bed without using bedrails? 3  -CS     Moving from lying on back to sitting on the side of a flat bed without bedrails? 3  -CS     Moving to and from a bed to a chair (including a wheelchair)? 3  -CS     Standing up from a chair using your arms (e.g., wheelchair, bedside chair)? 3  -CS     Climbing 3-5 steps with a railing? 2  -CS     To walk in hospital room? 3  -CS     AM-PAC 6 Clicks Score (PT) 17  -CS     Highest level of mobility 5 --> Static standing  -CS     Row Name 03/30/23 1423          Functional Assessment    Outcome Measure  Options AM-PAC 6 Clicks Basic Mobility (PT)  -           User Key  (r) = Recorded By, (t) = Taken By, (c) = Cosigned By    Initials Name Provider Type    CS Michelle Sutton, PT Physical Therapist                             Physical Therapy Education     Title: PT OT SLP Therapies (Done)     Topic: Physical Therapy (Done)     Point: Mobility training (Done)     Learning Progress Summary           Patient Acceptance, E,TB, VU,DU by  at 3/30/2023 1424    Acceptance, E,TB,D, VU,NR by  at 3/29/2023 1135    Acceptance, E,TB,D, VU,NR by  at 3/27/2023 1638                   Point: Home exercise program (Done)     Learning Progress Summary           Patient Acceptance, E,TB, VU,DU by  at 3/30/2023 1424    Acceptance, E,TB,D, VU,NR by  at 3/29/2023 1135    Acceptance, E,TB,D, VU,NR by  at 3/27/2023 1638                   Point: Body mechanics (Done)     Learning Progress Summary           Patient Acceptance, E,TB, VU,DU by  at 3/30/2023 1424    Acceptance, E,TB,D, VU,NR by  at 3/29/2023 1135    Acceptance, E,TB,D, VU,NR by  at 3/27/2023 1638                   Point: Precautions (Done)     Learning Progress Summary           Patient Acceptance, E,TB, VU,DU by  at 3/30/2023 1424    Acceptance, E,TB,D, VU,NR by  at 3/29/2023 1135    Acceptance, E,TB,D, VU,NR by  at 3/27/2023 1638                               User Key     Initials Effective Dates Name Provider Type Discipline     06/16/21 -  Christelle Gomes, PT Physical Therapist PT     04/08/22 -  China Sandra PT Physical Therapist PT     09/22/22 -  Michelle Sutton PT Physical Therapist PT              PT Recommendation and Plan     Plan of Care Reviewed With: patient  Progress: improving  Outcome Evaluation: Pt received UIC upon arrival and agreeable to PT. Pt completed STS requiring CGA but had a posterior LOB when initially standing requiring greater assist to safely recover. Pt ambulated 75' c RW requiring CGA/min A. Pt  demonstrates a forward flexed posture and sustained B knee flexion while ambulating. Pt states this is his baseline and a chronic issue. Pt presents with a slow pace but no LOB. Pt with reports of SOA with activity but O2 sats at 98% on RA following activity. Pt UIC with all needs in reach at end of session. Pt will continue to benefit from skilled PT to address strength, endurance, and functional mobility.     Time Calculation:    PT Charges     Row Name 03/30/23 1424             Time Calculation    Start Time 1342  -CS      Stop Time 1358  -CS      Time Calculation (min) 16 min  -CS      PT Received On 03/30/23  -CS      PT - Next Appointment 03/31/23  -CS         Time Calculation- PT    Total Timed Code Minutes- PT 15 minute(s)  -CS         Timed Charges    28815 - PT Therapeutic Activity Minutes 15  -CS         Total Minutes    Timed Charges Total Minutes 15  -CS       Total Minutes 15  -CS            User Key  (r) = Recorded By, (t) = Taken By, (c) = Cosigned By    Initials Name Provider Type    CS Michelle Sutton, PT Physical Therapist              Therapy Charges for Today     Code Description Service Date Service Provider Modifiers Qty    [id number]  PT THERAPEUTIC ACT EA 15 MIN 3/30/2023 Michelle Sutton, PT GP 1          PT G-Codes  Outcome Measure Options: AM-PAC 6 Clicks Basic Mobility (PT)  AM-PAC 6 Clicks Score (PT): 17  PT Discharge Summary  Anticipated Discharge Disposition (PT): home with 24/7 care, home with home health, skilled nursing facility    Michelle Sutton PT  3/30/2023

## 2023-03-30 NOTE — PROGRESS NOTES
LOS: 8 days     Chief Complaint: Diabetic foot infection    Interval History: Doing well this morning.  Remains without fever or leukocytosis.    Vital Signs  Temp:  [97.8 °F (36.6 °C)-98.8 °F (37.1 °C)] 98.8 °F (37.1 °C)  Heart Rate:  [63-76] 65  Resp:  [18-20] 18  BP: (111-144)/(58-80) 139/80    Physical Exam:  General: In no acute distress  HEENT: Oropharynx clear, moist mucous membranes  Cardiovascular: RRR  Respiratory: Normal work of breathing  GI: Soft, NT/ND  Extremities: Improving edema and erythema of the foot.  Wound VAC in place.  Access: Peripheral IV.    Antibiotics:  Anti-Infectives (From admission, onward)    Ordered     Dose/Rate Route Frequency Start Stop    03/28/23 0942  levoFLOXacin (LEVAQUIN) tablet 750 mg        Ordering Provider: Mauricio Yates,     750 mg Oral Every Other Day 03/28/23 1900 05/05/23 2359    03/25/23 1845  vancomycin 1250 mg/250 mL 0.9% NS IVPB ([facility])        Ordering Provider: Noah Pritchard MD    1,250 mg  over 60 Minutes Intravenous Every 24 Hours 03/26/23 0200 03/27/23 0306    03/23/23 0835  vancomycin (VANCOCIN) 1000 mg/200 mL dextrose 5% IVPB        Ordering Provider: Anuradha Desouza APRN    1,000 mg  over 60 Minutes Intravenous Once 03/23/23 1030 03/23/23 1332    03/22/23 1744  piperacillin-tazobactam (ZOSYN) 4.5 g in iso-osmotic dextrose 100 mL IVPB (premix)        Ordering Provider: Richard Felipe II, MD    4.5 g  over 30 Minutes Intravenous Once 03/22/23 1746 03/22/23 1910    03/22/23 1741  vancomycin 2250 mg/500 mL 0.9% NS IVPB ([facility])        Ordering Provider: Richard Felipe II, MD    20 mg/kg × 113 kg Intravenous Once 03/22/23 1743 03/22/23 2238           Results Review:     I reviewed the patient's new clinical results.    Lab Results   Component Value Date    WBC 10.08 03/30/2023    HGB 11.0 (L) 03/30/2023    HCT 32.1 (L) 03/30/2023    MCV 84.3 03/30/2023     03/30/2023     Lab Results   Component Value Date    GLUCOSE  128 (H) 03/30/2023    BUN 28 (H) 03/30/2023    CREATININE 2.00 (H) 03/30/2023    EGFRIFNONA 29 (L) 09/10/2021    EGFRIFAFRI 32 (L) 01/18/2022    BCR 14.0 03/30/2023    CO2 28.0 03/30/2023    CALCIUM 9.4 03/30/2023    ALBUMIN 3.3 (L) 03/29/2023    LABIL2 1.1 07/01/2020    AST 30 03/29/2023    ALT 18 03/29/2023       Microbiology:  3/22 blood cultures no growth to date  3/24 OR cultures strep anginosus and E. coli    Assessment    #Left diabetic foot infection status post irrigation and debridement on 3/24  #Type 2 diabetes  #MARTIN on CKD  #Renal clear-cell carcinoma     Continue p.o. levofloxacin 750 mg every 48 hours for 6 weeks of therapy through 5/5 and follow-up in clinic with me at that time.  Telemetry reviewed and QTc within normal limits.    Thank you for allowing me to be involved in the care of this patient. Infectious diseases will sign off at this time with antibiotics plan in place, but please call me at [phone] if any further ID questions or new ID concerns.

## 2023-03-30 NOTE — DISCHARGE PLACEMENT REQUEST
"ChayoRon shaw (73 y.o. Male)     Date of Birth   1950    Social Security Number       Address   [address] [place] [id number]    Home Phone   [phone]    MRN   [id number]       [facility]    Marital Status   Single                            Admission Date   3/22/23    Admission Type   Emergency    Admitting Provider   Dani Young MD    Attending Provider   Baltazar Velasco MD    Department, Room/Bed   [address], E556/1       Discharge Date       Discharge Disposition       Discharge Destination                               Attending Provider: Baltazar Velasco MD    Allergies: No Known Allergies    Isolation: None   Infection: None   Code Status: CPR    Ht: 190.5 cm (75\")   Wt: 123 kg (272 lb 0.8 oz)    Admission Cmt: None   Principal Problem: Cellulitis of left foot [L03.116]                 Active Insurance as of 3/22/2023     Primary Coverage     Payor Plan Insurance Group Employer/Plan Group    HUMANA MEDICARE REPLACEMENT HU[employer]NA MEDICARE REPLACEMENT [id number]     Payor Plan Address Payor Plan Phone Number Payor Plan Fax Number Effective Dates    [address] [address] [phone]  1/1/2018 - None Entered    [facility] [id number]       Subscriber Name Subscriber Birth Date Member ID       RON DOMINGO 1950 [id number]                 Emergency Contacts      (Rel.) Home Phone Work Phone Mobile Phone    BELÉN DOMINGO (Daughter) -- -- [phone]    MAHOGANY NOLASCO (Sister) -- -- [phone]    CHAYORON SHAW (Son) -- -- [phone]              "

## 2023-03-30 NOTE — PLAN OF CARE
Goal Outcome Evaluation:  Plan of Care Reviewed With: patient        Progress: improving  Outcome Evaluation: Pt received UIC upon arrival and agreeable to PT. Pt completed STS requiring CGA but had a posterior LOB when initially standing requiring greater assist to safely recover. Pt ambulated 75' c RW requiring CGA/min A. Pt demonstrates a forward flexed posture and sustained B knee flexion while ambulating. Pt states this is his baseline and a chronic issue. Pt presents with a slow pace but no LOB. Pt with reports of SOA with activity but O2 sats at 98% on RA following activity. Pt UIC with all needs in reach at end of session. Pt will continue to benefit from skilled PT to address strength, endurance, and functional mobility.

## 2023-03-31 ENCOUNTER — READMISSION MANAGEMENT (OUTPATIENT)
Dept: CALL CENTER | Facility: HOSPITAL | Age: 73
End: 2023-03-31
Payer: MEDICARE

## 2023-03-31 VITALS
TEMPERATURE: 98.5 F | RESPIRATION RATE: 18 BRPM | OXYGEN SATURATION: 96 % | DIASTOLIC BLOOD PRESSURE: 52 MMHG | BODY MASS INDEX: 32.4 KG/M2 | WEIGHT: 260.58 LBS | HEIGHT: 75 IN | SYSTOLIC BLOOD PRESSURE: 103 MMHG | HEART RATE: 58 BPM

## 2023-03-31 LAB
GLUCOSE BLDC GLUCOMTR-MCNC: 157 MG/DL (ref 70–130)
GLUCOSE BLDC GLUCOMTR-MCNC: 158 MG/DL (ref 70–130)
GLUCOSE BLDC GLUCOMTR-MCNC: 160 MG/DL (ref 70–130)

## 2023-03-31 PROCEDURE — 63710000001 INSULIN LISPRO (HUMAN) PER 5 UNITS: Performed by: SURGERY

## 2023-03-31 PROCEDURE — 25010000002 ENOXAPARIN PER 10 MG: Performed by: SURGERY

## 2023-03-31 PROCEDURE — 82962 GLUCOSE BLOOD TEST: CPT

## 2023-03-31 PROCEDURE — 97530 THERAPEUTIC ACTIVITIES: CPT

## 2023-03-31 RX ORDER — LEVOFLOXACIN 750 MG/1
750 TABLET ORAL EVERY OTHER DAY
Qty: 15 TABLET | Refills: 1 | Status: SHIPPED | OUTPATIENT
Start: 2023-04-01 | End: 2023-05-06

## 2023-03-31 RX ORDER — HYDROCODONE BITARTRATE AND ACETAMINOPHEN 7.5; 325 MG/1; MG/1
1 TABLET ORAL EVERY 4 HOURS PRN
Qty: 20 TABLET | Refills: 0 | Status: SHIPPED | OUTPATIENT
Start: 2023-03-31 | End: 2023-04-04

## 2023-03-31 RX ADMIN — ENOXAPARIN SODIUM 40 MG: 100 INJECTION SUBCUTANEOUS at 08:36

## 2023-03-31 RX ADMIN — NEBIVOLOL 10 MG: 10 TABLET ORAL at 05:48

## 2023-03-31 RX ADMIN — AMLODIPINE BESYLATE 10 MG: 10 TABLET ORAL at 05:48

## 2023-03-31 RX ADMIN — HYDROCODONE BITARTRATE AND ACETAMINOPHEN 1 TABLET: 7.5; 325 TABLET ORAL at 05:46

## 2023-03-31 RX ADMIN — INSULIN LISPRO 2 UNITS: 100 INJECTION, SOLUTION INTRAVENOUS; SUBCUTANEOUS at 11:52

## 2023-03-31 RX ADMIN — HYDROCODONE BITARTRATE AND ACETAMINOPHEN 1 TABLET: 7.5; 325 TABLET ORAL at 09:57

## 2023-03-31 RX ADMIN — HYDROCHLOROTHIAZIDE 25 MG: 25 TABLET ORAL at 08:35

## 2023-03-31 RX ADMIN — FERROUS SULFATE TAB 325 MG (65 MG ELEMENTAL FE) 325 MG: 325 (65 FE) TAB at 11:52

## 2023-03-31 RX ADMIN — DULOXETINE HYDROCHLORIDE 60 MG: 60 CAPSULE, DELAYED RELEASE ORAL at 08:35

## 2023-03-31 RX ADMIN — FENOFIBRATE 145 MG: 145 TABLET, FILM COATED ORAL at 08:35

## 2023-03-31 RX ADMIN — PREGABALIN 200 MG: 100 CAPSULE ORAL at 08:35

## 2023-03-31 RX ADMIN — FINASTERIDE 5 MG: 5 TABLET, FILM COATED ORAL at 08:35

## 2023-03-31 RX ADMIN — INSULIN LISPRO 2 UNITS: 100 INJECTION, SOLUTION INTRAVENOUS; SUBCUTANEOUS at 08:35

## 2023-03-31 RX ADMIN — PANTOPRAZOLE SODIUM 40 MG: 40 TABLET, DELAYED RELEASE ORAL at 05:46

## 2023-03-31 RX ADMIN — TAMSULOSIN HYDROCHLORIDE 0.4 MG: 0.4 CAPSULE ORAL at 08:35

## 2023-03-31 RX ADMIN — LOSARTAN POTASSIUM 100 MG: 100 TABLET, FILM COATED ORAL at 08:35

## 2023-03-31 RX ADMIN — Medication 10 ML: at 08:35

## 2023-03-31 NOTE — PROGRESS NOTES
"DAILY PROGRESS NOTE  [facility]    Patient Identification:  Name: Tomy Manjarrez  Age: 73 y.o.  Sex: male  :  1950  MRN: [id number]         Primary Care Physician: Zoya Claros PA    Subjective:  Interval History: He complains of pain in the left foot.    Objective:    Scheduled Meds:allopurinol, 100 mg, Oral, Q PM  amitriptyline, 25 mg, Oral, Nightly  amLODIPine, 10 mg, Oral, QAM  atorvastatin, 40 mg, Oral, Nightly  DULoxetine, 60 mg, Oral, Daily  enoxaparin, 40 mg, Subcutaneous, Daily  fenofibrate, 145 mg, Oral, Daily  ferrous sulfate, 325 mg, Oral, Daily With Lunch  finasteride, 5 mg, Oral, Daily  losartan, 100 mg, Oral, Q24H   And  hydroCHLOROthiazide, 25 mg, Oral, Q24H  insulin glargine, 30 Units, Subcutaneous, Nightly  insulin lispro, 0-14 Units, Subcutaneous, TID AC  levoFLOXacin, 750 mg, Oral, Every Other Day  nebivolol, 10 mg, Oral, QAM  pantoprazole, 40 mg, Oral, Q AM  pregabalin, 200 mg, Oral, Daily  sodium chloride, 10 mL, Intravenous, Q12H  tamsulosin, 0.4 mg, Oral, Daily  tiZANidine, 4 mg, Oral, Nightly  traZODone, 100 mg, Oral, Nightly      Continuous Infusions:lactated ringers, 9 mL/hr, Last Rate: 9 mL/hr (23 1302)  sodium chloride, 9 mL/hr        Vital signs in last 24 hours:  Temp:  [98 °F (36.7 °C)-98.9 °F (37.2 °C)] 98.5 °F (36.9 °C)  Heart Rate:  [58-72] 58  Resp:  [16-18] 18  BP: (103-134)/(46-73) 103/52    Intake/Output:    Intake/Output Summary (Last 24 hours) at 3/31/2023 1318  Last data filed at 3/31/2023 0854  Gross per 24 hour   Intake 600 ml   Output 1325 ml   Net -725 ml       Exam:  /52 (BP Location: Right arm, Patient Position: Sitting)   Pulse 58   Temp 98.5 °F (36.9 °C) (Oral)   Resp 18   Ht 190.5 cm (75\")   Wt 118 kg (260 lb 9.3 oz)   SpO2 96%   BMI 32.57 kg/m²     General Appearance:    Alert, cooperative, no distress   Head:    Normocephalic, without obvious abnormality, atraumatic   Eyes:       Throat:   Lips, tongue, " gums normal   Neck:   Supple, symmetrical, trachea midline, no JVD   Lungs:     Clear to auscultation bilaterally, respirations unlabored   Chest Wall:    No tenderness or deformity    Heart:    Regular rate and rhythm, S1 and S2 normal, no murmur,no  Rub or gallop   Abdomen:     Soft, nontender, bowel sounds active, no masses, no organomegaly    Extremities:   Extremities normal, atraumatic, no cyanosis, wound VAC in place on left foot over wound   Pulses:      Skin:   Skin is warm and dry,  no rashes or palpable lesions   Neurologic:   no focal deficits noted      Lab Results (last 72 hours)     Procedure Component Value Units Date/Time    POC Glucose Once [[id number]]  (Abnormal) Collected: 03/31/23 1040    Specimen: Blood Updated: 03/31/23 1041     Glucose 160 mg/dL      Comment: Meter: [id number] : [id number] Cooler Planetessa NA       POC Glucose Once [[id number]]  (Abnormal) Collected: 03/31/23 0634    Specimen: Blood Updated: 03/31/23 0634     Glucose 157 mg/dL      Comment: Meter: [id number] : [id number] Green Rossy NA       POC Glucose Once [[id number]]  (Abnormal) Collected: 03/30/23 2149    Specimen: Blood Updated: 03/30/23 2151     Glucose 217 mg/dL      Comment: Meter: [id number] : [id number] Green Rossy NA       POC Glucose Once [[id number]]  (Normal) Collected: 03/30/23 1626    Specimen: Blood Updated: 03/30/23 1627     Glucose 115 mg/dL      Comment: Meter: [id number] : [id number] Dalisay Ange NA       POC Glucose Once [[id number]]  (Abnormal) Collected: 03/30/23 1051    Specimen: Blood Updated: 03/30/23 1100     Glucose 161 mg/dL      Comment: Meter: [id number] : [id number] Dalisay Ange NA       POC Glucose Once [[id number]]  (Abnormal) Collected: 03/30/23 0627    Specimen: Blood Updated: 03/30/23 0628     Glucose 167 mg/dL      Comment: Meter: [id number] : [id number] Green Orssy NA       Basic Metabolic Panel [[id number]]  (Abnormal) Collected: 03/30/23 0438    Specimen: Blood  Updated: 03/30/23 0524     Glucose 128 mg/dL      BUN 28 mg/dL      Creatinine 2.00 mg/dL      Sodium 139 mmol/L      Potassium 4.3 mmol/L      Chloride 104 mmol/L      CO2 28.0 mmol/L      Calcium 9.4 mg/dL      BUN/Creatinine Ratio 14.0     Anion Gap 7.0 mmol/L      eGFR 34.4 mL/min/1.73     Narrative:      GFR Normal >60  Chronic Kidney Disease <60  Kidney Failure <15    The GFR formula is only valid for adults with stable renal function between ages 18 and 70.    CBC (No Diff) [[id number]]  (Abnormal) Collected: 03/30/23 0438    Specimen: Blood Updated: 03/30/23 0507     WBC 10.08 10*3/mm3      RBC 3.81 10*6/mm3      Hemoglobin 11.0 g/dL      Hematocrit 32.1 %      MCV 84.3 fL      MCH 28.9 pg      MCHC 34.3 g/dL      RDW 13.5 %      RDW-SD 41.6 fl      MPV 11.7 fL      Platelets 318 10*3/mm3     POC Glucose Once [[id number]]  (Abnormal) Collected: 03/29/23 2045    Specimen: Blood Updated: 03/29/23 2046     Glucose 165 mg/dL      Comment: Meter: [id number] : [id number] Arthur HILLS       POC Glucose Once [[id number]]  (Normal) Collected: 03/29/23 1639    Specimen: Blood Updated: 03/29/23 1640     Glucose 125 mg/dL      Comment: Meter: [id number] : [id number] Raf HILLS       Vancomycin, Trough [[id number]]  (Normal) Collected: 03/29/23 1350    Specimen: Blood Updated: 03/29/23 1436     Vancomycin Trough 7.90 mcg/mL     Narrative:      Therapeutic Ranges for Vancomycin    Vancomycin Random   5.0-40.0 mcg/mL  Vancomycin Trough   5.0-20.0 mcg/mL  Vancomycin Peak     20.0-40.0 mcg/mL    POC Glucose Once [[id number]]  (Abnormal) Collected: 03/29/23 1110    Specimen: Blood Updated: 03/29/23 1113     Glucose 154 mg/dL      Comment: Meter: [id number] : [id number] Robbie HILLS       Anaerobic Culture - Surgical Site, Toe, Left [[id number]]  (Normal) Collected: 03/24/23 1436    Specimen: Surgical Site from Toe, Left Updated: 03/29/23 0710     Anaerobic Culture No anaerobes isolated at 5 days     Comprehensive Metabolic Panel [[id number]]  (Abnormal) Collected: 03/29/23 0550    Specimen: Blood Updated: 03/29/23 0632     Glucose 124 mg/dL      BUN 25 mg/dL      Creatinine 1.94 mg/dL      Sodium 139 mmol/L      Potassium 4.2 mmol/L      Chloride 105 mmol/L      CO2 26.4 mmol/L      Calcium 9.2 mg/dL      Total Protein 7.3 g/dL      Albumin 3.3 g/dL      ALT (SGPT) 18 U/L      AST (SGOT) 30 U/L      Alkaline Phosphatase 148 U/L      Total Bilirubin 0.5 mg/dL      Globulin 4.0 gm/dL      A/G Ratio 0.8 g/dL      BUN/Creatinine Ratio 12.9     Anion Gap 7.6 mmol/L      eGFR 35.7 mL/min/1.73     Narrative:      GFR Normal >60  Chronic Kidney Disease <60  Kidney Failure <15    The GFR formula is only valid for adults with stable renal function between ages 18 and 70.    Magnesium [[id number]]  (Normal) Collected: 03/29/23 0550    Specimen: Blood Updated: 03/29/23 0632     Magnesium 1.8 mg/dL     POC Glucose Once [[id number]]  (Normal) Collected: 03/29/23 0619    Specimen: Blood Updated: 03/29/23 0621     Glucose 129 mg/dL      Comment: Meter: [id number] : [id number] Arthur HILLS       CBC & Differential [[id number]]  (Abnormal) Collected: 03/29/23 0550    Specimen: Blood Updated: 03/29/23 0619    Narrative:      The following orders were created for panel order CBC & Differential.  Procedure                               Abnormality         Status                     ---------                               -----------         ------                     CBC Auto Differential[[id number]]        Abnormal            Final result                 Please view results for these tests on the individual orders.    CBC Auto Differential [[id number]]  (Abnormal) Collected: 03/29/23 0550    Specimen: Blood Updated: 03/29/23 0619     WBC 11.53 10*3/mm3      RBC 3.71 10*6/mm3      Hemoglobin 10.8 g/dL      Hematocrit 31.7 %      MCV 85.4 fL      MCH 29.1 pg      MCHC 34.1 g/dL      RDW 13.8 %      RDW-SD 42.8 fl      MPV 12.2 fL       Platelets 275 10*3/mm3      Neutrophil % 61.3 %      Lymphocyte % 20.4 %      Monocyte % 10.6 %      Eosinophil % 6.3 %      Basophil % 0.5 %      Immature Grans % 0.9 %      Neutrophils, Absolute 7.07 10*3/mm3      Lymphocytes, Absolute 2.35 10*3/mm3      Monocytes, Absolute 1.22 10*3/mm3      Eosinophils, Absolute 0.73 10*3/mm3      Basophils, Absolute 0.06 10*3/mm3      Immature Grans, Absolute 0.10 10*3/mm3      nRBC 0.0 /100 WBC     POC Glucose Once [[id number]]  (Abnormal) Collected: 03/28/23 2112    Specimen: Blood Updated: 03/28/23 2114     Glucose 204 mg/dL      Comment: Meter: [id number] : [id number] Arthur Rossy HILLS       POC Glucose Once [[id number]]  (Abnormal) Collected: 03/28/23 1459    Specimen: Blood Updated: 03/28/23 1500     Glucose 140 mg/dL      Comment: Meter: [id number] : [id number] Jackelyn Benz RN           Data Review:  Results from last 7 days   Lab Units 03/30/23  0438 03/29/23  0550 03/28/23  0457   SODIUM mmol/L 139 139 138   POTASSIUM mmol/L 4.3 4.2 4.5   CHLORIDE mmol/L 104 105 105   CO2 mmol/L 28.0 26.4 26.5   BUN mg/dL 28* 25* 30*   CREATININE mg/dL 2.00* 1.94* 2.16*   GLUCOSE mg/dL 128* 124* 126*   CALCIUM mg/dL 9.4 9.2 9.4     Results from last 7 days   Lab Units 03/30/23  0438 03/29/23  0550 03/28/23  0457   WBC 10*3/mm3 10.08 11.53* 10.80   HEMOGLOBIN g/dL 11.0* 10.8* 11.3*   HEMATOCRIT % 32.1* 31.7* 34.0*   PLATELETS 10*3/mm3 318 275 259         Results from last 7 days   Lab Units 03/28/23  0457   HEMOGLOBIN A1C % 7.80*     No results found for: TROPONINT      Results from last 7 days   Lab Units 03/29/23  0550 03/28/23  0457   ALK PHOS U/L 148* 157*   BILIRUBIN mg/dL 0.5 0.6   ALT (SGPT) U/L 18 22   AST (SGOT) U/L 30 33         Results from last 7 days   Lab Units 03/28/23  0457   HEMOGLOBIN A1C % 7.80*     Glucose   Date/Time Value Ref Range Status   03/31/2023 1040 160 (H) 70 - 130 mg/dL Final     Comment:     Meter: [id number] : [id number] Raf HILLS    03/31/2023 0634 157 (H) 70 - 130 mg/dL Final     Comment:     Meter: [id number] : [id number] Arthur Blair NA   03/30/2023 2149 217 (H) 70 - 130 mg/dL Final     Comment:     Meter: [id number] : [id number] Arthur Blair NA   03/30/2023 1626 115 70 - 130 mg/dL Final     Comment:     Meter: [id number] : [id number] Raf Boscha NA   03/30/2023 1051 161 (H) 70 - 130 mg/dL Final     Comment:     Meter: [id number] : [id number] Raf Boscha NA   03/30/2023 0627 167 (H) 70 - 130 mg/dL Final     Comment:     Meter: [id number] : [id number] Arthur Blair NA   03/29/2023 2045 165 (H) 70 - 130 mg/dL Final     Comment:     Meter: [id number] : [id number] Arthur Blair NA   03/29/2023 1639 125 70 - 130 mg/dL Final     Comment:     Meter: [id number] : [id number] Raf Cassidy NA           Past Medical History:   Diagnosis Date   • Arthritis    • BPH (benign prostatic hyperplasia)    • Chronic back pain    • Chronic kidney disease (CKD), stage III (moderate) ([facility])    • Diabetes mellitus (HCC)     TYPE 2   • Difficulty urinating     REQUIRING STRAIGHT CATH   • GERD (gastroesophageal reflux disease)    • Gout    • Hyperlipidemia    • Hypertension    • Left kidney mass    • Neuropathy     IN FEET   • Neuropathy in diabetes (HCC)    • Type 2 diabetes mellitus (HCC)        Assessment:  Active Hospital Problems    Diagnosis  POA   • **Cellulitis of left foot [L03.116]  Yes   • Diarrhea [R19.7]  No   • Clear cell carcinoma of kidney (HCC) [C64.9]  Yes   • BPH with urinary obstruction [N40.1, N13.8]  Yes   • Stage 3b chronic kidney disease (HCC) [N18.32]  Yes   • Essential hypertension [I10]  Yes   • Type 2 diabetes mellitus with hyperglycemia, with long-term current use of insulin (HCC) [E11.65, Z79.4]  Not Applicable   • Hypertension [I10]  Yes      Resolved Hospital Problems    Diagnosis Date Resolved POA   • MARTIN (acute kidney injury) (HCC) [N17.9] 03/30/2023 Yes   • Hyponatremia [E87.1] 03/30/2023 Yes        Plan:  Continue with antibiotics per infectious disease.  Wound care and wound VAC per vascular surgery.  DC planning.  Case management is having difficult time finding nursing care for home care.  Possibly home later of this can be arranged if not we will have to keep working on getting home care plans in place.    Mahin Armijo MD  3/31/2023  13:18 EDT

## 2023-03-31 NOTE — PLAN OF CARE
Goal Outcome Evaluation:  Plan of Care Reviewed With: patient        Progress: no change  Outcome Evaluation: Pt received UIC upon arrival and agreeable to PT. Pt stood requiring SBA and discussed management of portable wound vac. After standing pt had reports of dizziness and returned to sitting UIC. Pt states he just feels fatigued and discouraged as her anticipated to D/C home today. Pt declined further mobility. RN aware. PT encouraged pt to sit UIC and complete HEP throughout the day. PT provided education regarding home safety. Acute PT will f/u tomorrow if pt still here.

## 2023-03-31 NOTE — NURSING NOTE
CWOCN- VAC replaced this am after removal/assessment by MD. Plan for home once home care in place. I went ahead and put him on the home machine today ([employer]). Patient tolerated well. I used barrier ring to get a better seal around the inner aspect of the toes.   Plan M-W-F dressing care.      03/31/23 0946   Wound 03/24/23 Left anterior foot Incision   Placement Date: 03/24/23   Present on Hospital Admission: No  Side: Left  Orientation: anterior  Location: foot  Primary Wound Type: Incision   Dressing Appearance moist drainage   Base clean;granulating;moist;pink;red   Periwound dry   Periwound Temperature warm   Periwound Skin Turgor soft   Edges open   Drainage Characteristics/Odor serous   Drainage Amount scant   Care, Wound cleansed with;sterile normal saline;negative pressure wound therapy   Dressing Care dressing changed   Periwound Care barrier film applied;dry periwound area maintained   NPWT (Negative Pressure Wound Therapy) 03/29/23 1500 left foot   Placement Date/Time: 03/29/23 1500   Location: left foot   Therapy Setting continuous therapy   Dressing foam, black;transparent dressing   Pressure Setting 125 mmHg   Sponges Inserted 1   Sponges Removed   (removed prior by MD)   Finger sweep complete Yes

## 2023-03-31 NOTE — PLAN OF CARE
Goal Outcome Evaluation:patient being discharged to home with [facility]. PIV removed and dc instructions reviewed with the patient; understanding verbalized.

## 2023-03-31 NOTE — DISCHARGE SUMMARY
PHYSICIAN DISCHARGE SUMMARY                                                                        [facility]    Patient Identification:  Name: Tomy Manjarrez  Age: 73 y.o.  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]  Yes    BPH with urinary obstruction [N40.1, N13.8]  Yes    Stage 3b chronic kidney disease (HCC) [N18.32]  Yes    Essential hypertension [I10]  Yes    Type 2 diabetes mellitus with hyperglycemia, with long-term current use of insulin (HCC) [E11.65, Z79.4]  Not Applicable    Hypertension [I10]  Yes      Resolved Hospital Problems    Diagnosis Date Resolved POA    MARTIN (acute kidney injury) (HCC) [N17.9] 2023 Yes    Hyponatremia [E87.1] 2023 Yes    Sepsis with MARTIN due to left diabetic foot infection with cellulitis present on admission      PMHX:   Past Medical History:   Diagnosis Date    Arthritis     BPH (benign prostatic hyperplasia)     Chronic back pain     Chronic kidney disease (CKD), stage III (moderate) (HCC)     Diabetes mellitus (HCC)     TYPE 2    Difficulty urinating     REQUIRING STRAIGHT CATH    GERD (gastroesophageal reflux disease)     Gout     Hyperlipidemia     Hypertension     Left kidney mass     Neuropathy     IN FEET    Neuropathy in diabetes (HCC)     Type 2 diabetes mellitus (HCC)      PSHX:   Past Surgical History:   Procedure Laterality Date    CARDIAC CATHETERIZATION  2016    COLONOSCOPY      CYSTOSCOPY TRANSURETHRAL RESECTION OF PROSTATE N/A 2021    Procedure: CYSTOSCOPY TRANSURETHRAL RESECTION OF PROSTATE TRANSURETHRAL INCISION OF PROSTATE;  Surgeon:  Tres Mena MD;  Location: [facility];  Service: Urology;  Laterality: N/A;    NEPHRECTOMY PARTIAL Left 11/19/2019    Procedure: LEFT PARTIAL NEPHRECTOMY X2, INTRAOPERATIVE RENAL ULTRASONOGRAPHY;  Surgeon: Tres Mena MD;  Location: [facility];  Service: Urology    WISDOM TOOTH EXTRACTION         Hospital Course: Tomy Manjarrez   is a 73-year-old male with history of type 2 diabetes, hypertension, CKD stage III, clear-cell carcinoma of the kidney who presents to the emergency room with foot pain and swelling.  Patient states that he has had some swelling in his left foot for about a week, noticed some redness and worsening of swelling and developed some numbness today.  He also states he had some mild numbness in his hands bilaterally earlier today but that has resolved.  He denies having any fever at home.  He has had no injury to his foot that he is aware of.  He has been able to walk on his foot, he normally walks with a walker and has not had any change in his mobility.  He has not been started on any new medications.  He states his blood sugars run in the 200s, he has been compliant with his medications.  There is a history in the chart of alcohol use, patient denies any recent alcohol use for several years.  In the emergency room patient's blood glucose 219, sodium 131, potassium 4.4, creatinine 3.12, BUN 49, CRP 22.87, lactate 1.2, white blood cell count 15.53, hemoglobin 11.5, hematocrit 34.0, sed rate 67, blood culture pending.  X-ray of his left foot shows air within the soft tissue at the ventral aspect of the foot at the level of the base of the first proximal phalanx, no radiopaque foreign body within the soft tissue, no convincing evidence is seen for osteomyelitis there are advanced osteoarthritic changes at the first M TP joint.  Patient was given vancomycin and Zosyn in the emergency room.  The patient was admitted to the hospital and seen by infectious  disease, vascular surgery, urology and neurology.  Patient had angiogram and debridement of wound and wound VAC placed.  He was doing better after the angiogram and looked well enough to go home.  Infectious disease recommends 6-week course of oral antibiotics.  He will have home health care with wound VAC care at home.  He will follow-up with his specialist.  He will also follow-up with his primary care in 1 week.      Consults:     Consults       Date and Time Order Name Status Description    3/23/2023 11:44 AM Inpatient Neurology Consult General Completed     3/23/2023 11:44 AM Inpatient Urology Consult      3/23/2023  7:39 AM Inpatient Infectious Diseases Consult Completed     3/22/2023 11:49 PM Inpatient Vascular Surgery Consult Completed     3/22/2023  9:36 PM [facility] (on-call MD unless specified) Details            Results from last 7 days   Lab Units 03/30/23  0438   WBC 10*3/mm3 10.08   HEMOGLOBIN g/dL 11.0*   HEMATOCRIT % 32.1*   PLATELETS 10*3/mm3 318     Results from last 7 days   Lab Units 03/30/23  0438   SODIUM mmol/L 139   POTASSIUM mmol/L 4.3   CHLORIDE mmol/L 104   CO2 mmol/L 28.0   BUN mg/dL 28*   CREATININE mg/dL 2.00*   GLUCOSE mg/dL 128*   CALCIUM mg/dL 9.4     Significant Diagnostic Studies:   WBC   Date Value Ref Range Status   03/30/2023 10.08 3.40 - 10.80 10*3/mm3 Final     Hemoglobin   Date Value Ref Range Status   03/30/2023 11.0 (L) 13.0 - 17.7 g/dL Final     Hematocrit   Date Value Ref Range Status   03/30/2023 32.1 (L) 37.5 - 51.0 % Final     Platelets   Date Value Ref Range Status   03/30/2023 318 140 - 450 10*3/mm3 Final     Sodium   Date Value Ref Range Status   03/30/2023 139 136 - 145 mmol/L Final     Potassium   Date Value Ref Range Status   03/30/2023 4.3 3.5 - 5.2 mmol/L Final     Chloride   Date Value Ref Range Status   03/30/2023 104 98 - 107 mmol/L Final     CO2   Date Value Ref Range Status   03/30/2023 28.0 22.0 - 29.0 mmol/L Final     BUN   Date Value Ref Range Status    03/30/2023 28 (H) 8 - 23 mg/dL Final     Creatinine   Date Value Ref Range Status   03/30/2023 2.00 (H) 0.76 - 1.27 mg/dL Final     Glucose   Date Value Ref Range Status   03/30/2023 128 (H) 65 - 99 mg/dL Final     Calcium   Date Value Ref Range Status   03/30/2023 9.4 8.6 - 10.5 mg/dL Final     Magnesium   Date Value Ref Range Status   03/29/2023 1.8 1.6 - 2.4 mg/dL Final     AST (SGOT)   Date Value Ref Range Status   03/29/2023 30 1 - 40 U/L Final     ALT (SGPT)   Date Value Ref Range Status   03/29/2023 18 1 - 41 U/L Final     Alkaline Phosphatase   Date Value Ref Range Status   03/29/2023 148 (H) 39 - 117 U/L Final     No results found for: APTT, INR  No results found for: COLORU, CLARITYU, SPECGRAV, PHUR, PROTEINUR, GLUCOSEU, KETONESU, BLOODU, NITRITE, LEUKOCYTESUR, BILIRUBINUR, UROBILINOGEN, RBCUA, WBCUA, BACTERIA, UACOMMENT  No results found for: TROPONINT, TROPONINI, BNP  No components found for: HGBA1C;2  No components found for: TSH;2  Imaging Results (All)       Procedure Component Value Units Date/Time    Arteriogram (Autofinalize) [[id number]] Resulted: 03/28/23 1501     Updated: 03/28/23 1501    Narrative:      This procedure was auto-finalized with no dictation required.    MRI Foot Left Without Contrast [[id number]] Collected: 03/23/23 1131     Updated: 03/23/23 1141    Narrative:      MRI LEFT MID AND FOREFOOT WITHOUT CONTRAST     HISTORY: Wound along the plantar side of the great toe for three or four  weeks.     TECHNIQUE: MRI left mid and forefoot is provided and correlated with  x-rays performed yesterday.     FINDINGS: Signal dropout representing soft tissue gas is more subtle on  this series than on the x-ray from yesterday but it localizes to the  dorsal and lateral aspects of the great toe and the medial second toe  base extending into the web space between the toes to the level of the  metatarsal heads. The most lateral, proximal focus of what appears to be  soft tissue gas is observed  dorsal to the second toe extensor tendon at  the level of the metatarsal head. There is soft tissue edema throughout  the mid and forefoot and on the short axis T2 images. The soft tissue  wound is observed along the plantar side of the foot with heterogeneous  fluid signal extending deep into the soft tissue toward the the first  webspace. No focal fluid collection is present.     The first metatarsophalangeal joint is deformed by advanced  osteoarthritis. The joints of the midfoot and the other joints in the  forefoot are preserved. There is no joint effusion.     Foot musculature is diffusely deconditioned. The flexor and extensor  tendons appear normal. There is no abnormal tendon sheath effusion.       Impression:      Soft tissue wound along the plantar side of the foot with a  sinus tract which extends toward the first webspace. There is diffuse  soft tissue edema as well as fairly extensive soft tissue gas around the  lateral half of the great toe, the medial base of the second toe, and  extending into the soft tissue dorsal to the second metatarsal head. No  focal abscess is present. There is no joint effusion or MRI evidence of  osteomyelitis.     This report was finalized on 3/23/2023 11:38 AM by Dr. Suman Baez M.D.       XR Foot 3+ View Left [[id number]] Collected: 03/22/23 1806     Updated: 03/22/23 1813    Narrative:      XR FOOT 3+ VW LEFT-     HISTORY: 73-year-old male with erythema.     FINDINGS: There is air within the soft tissues at the ventral aspect of  the foot at the level of the base of the 1st proximal phalanx. On the  frontal projection, the air is predominantly at the lateral aspect of  the proximal phalanx. No radiopaque foreign body is seen within the soft  tissues. No convincing evidence is seen for osteomyelitis. There are  advanced advanced osteoarthritic changes at the 1st MTP joint.     This report was finalized on 3/22/2023 6:10 PM by Dr. Lynne Lizarraga M.D.             Lab  Results (last 7 days)       Procedure Component Value Units Date/Time    POC Glucose Once [[id number]]  (Abnormal) Collected: 03/31/23 1040    Specimen: Blood Updated: 03/31/23 1041     Glucose 160 mg/dL      Comment: Meter: [id number] : [id number] [address] [place] NA       POC Glucose Once [[id number]]  (Abnormal) Collected: 03/31/23 0634    Specimen: Blood Updated: 03/31/23 0634     Glucose 157 mg/dL      Comment: Meter: [id number] : [id number] [place] NA       POC Glucose Once [[id number]]  (Abnormal) Collected: 03/30/23 2149    Specimen: Blood Updated: 03/30/23 2151     Glucose 217 mg/dL      Comment: Meter: [id number] : [id number] [employer] NA       POC Glucose Once [[id number]]  (Normal) Collected: 03/30/23 1626    Specimen: Blood Updated: 03/30/23 1627     Glucose 115 mg/dL      Comment: Meter: [id number] : [id number] [place] NA       POC Glucose Once [[id number]]  (Abnormal) Collected: 03/30/23 1051    Specimen: Blood Updated: 03/30/23 1100     Glucose 161 mg/dL      Comment: Meter: [id number] : [id number] [place] NA       POC Glucose Once [[id number]]  (Abnormal) Collected: 03/30/23 0627    Specimen: Blood Updated: 03/30/23 0628     Glucose 167 mg/dL      Comment: Meter: [id number] : [id number] [employer] NA       Basic Metabolic Panel [[id number]]  (Abnormal) Collected: 03/30/23 0438    Specimen: Blood Updated: 03/30/23 0524     Glucose 128 mg/dL      BUN 28 mg/dL      Creatinine 2.00 mg/dL      Sodium 139 mmol/L      Potassium 4.3 mmol/L      Chloride 104 mmol/L      CO2 28.0 mmol/L      Calcium 9.4 mg/dL      BUN/Creatinine Ratio 14.0     Anion Gap 7.0 mmol/L      eGFR 34.4 mL/min/1.73     Narrative:      GFR Normal >60  Chronic Kidney Disease <60  Kidney Failure <15    The GFR formula is only valid for adults with stable renal function between ages 18 and 70.    CBC (No Diff) [[id number]]  (Abnormal) Collected: 03/30/23 8661    Specimen: Blood Updated: 03/30/23 9472      WBC 10.08 10*3/mm3      RBC 3.81 10*6/mm3      Hemoglobin 11.0 g/dL      Hematocrit 32.1 %      MCV 84.3 fL      MCH 28.9 pg      MCHC 34.3 g/dL      RDW 13.5 %      RDW-SD 41.6 fl      MPV 11.7 fL      Platelets 318 10*3/mm3     POC Glucose Once [[id number]]  (Abnormal) Collected: 03/29/23 2045    Specimen: Blood Updated: 03/29/23 2046     Glucose 165 mg/dL      Comment: Meter: [id number] : [id number] Arthur Rossy REINIER       POC Glucose Once [[id number]]  (Normal) Collected: 03/29/23 1639    Specimen: Blood Updated: 03/29/23 1640     Glucose 125 mg/dL      Comment: Meter: [id number] : [id number] Raf HILLS       Vancomycin, Trough [[id number]]  (Normal) Collected: 03/29/23 1350    Specimen: Blood Updated: 03/29/23 1436     Vancomycin Trough 7.90 mcg/mL     Narrative:      Therapeutic Ranges for Vancomycin    Vancomycin Random   5.0-40.0 mcg/mL  Vancomycin Trough   5.0-20.0 mcg/mL  Vancomycin Peak     20.0-40.0 mcg/mL    POC Glucose Once [[id number]]  (Abnormal) Collected: 03/29/23 1110    Specimen: Blood Updated: 03/29/23 1113     Glucose 154 mg/dL      Comment: Meter: [id number] : [id number] Robbie HILLS       Anaerobic Culture - Surgical Site, Toe, Left [[id number]]  (Normal) Collected: 03/24/23 1436    Specimen: Surgical Site from Toe, Left Updated: 03/29/23 0710     Anaerobic Culture No anaerobes isolated at 5 days    Comprehensive Metabolic Panel [[id number]]  (Abnormal) Collected: 03/29/23 0550    Specimen: Blood Updated: 03/29/23 0632     Glucose 124 mg/dL      BUN 25 mg/dL      Creatinine 1.94 mg/dL      Sodium 139 mmol/L      Potassium 4.2 mmol/L      Chloride 105 mmol/L      CO2 26.4 mmol/L      Calcium 9.2 mg/dL      Total Protein 7.3 g/dL      Albumin 3.3 g/dL      ALT (SGPT) 18 U/L      AST (SGOT) 30 U/L      Alkaline Phosphatase 148 U/L      Total Bilirubin 0.5 mg/dL      Globulin 4.0 gm/dL      A/G Ratio 0.8 g/dL      BUN/Creatinine Ratio 12.9     Anion Gap 7.6 mmol/L      eGFR 35.7  mL/min/1.73     Narrative:      GFR Normal >60  Chronic Kidney Disease <60  Kidney Failure <15    The GFR formula is only valid for adults with stable renal function between ages 18 and 70.    Magnesium [[id number]]  (Normal) Collected: 03/29/23 0550    Specimen: Blood Updated: 03/29/23 0632     Magnesium 1.8 mg/dL     POC Glucose Once [[id number]]  (Normal) Collected: 03/29/23 0619    Specimen: Blood Updated: 03/29/23 0621     Glucose 129 mg/dL      Comment: Meter: [id number] : [id number] Arthur HILLS       CBC & Differential [[id number]]  (Abnormal) Collected: 03/29/23 0550    Specimen: Blood Updated: 03/29/23 0619    Narrative:      The following orders were created for panel order CBC & Differential.  Procedure                               Abnormality         Status                     ---------                               -----------         ------                     CBC Auto Differential[[id number]]        Abnormal            Final result                 Please view results for these tests on the individual orders.    CBC Auto Differential [[id number]]  (Abnormal) Collected: 03/29/23 0550    Specimen: Blood Updated: 03/29/23 0619     WBC 11.53 10*3/mm3      RBC 3.71 10*6/mm3      Hemoglobin 10.8 g/dL      Hematocrit 31.7 %      MCV 85.4 fL      MCH 29.1 pg      MCHC 34.1 g/dL      RDW 13.8 %      RDW-SD 42.8 fl      MPV 12.2 fL      Platelets 275 10*3/mm3      Neutrophil % 61.3 %      Lymphocyte % 20.4 %      Monocyte % 10.6 %      Eosinophil % 6.3 %      Basophil % 0.5 %      Immature Grans % 0.9 %      Neutrophils, Absolute 7.07 10*3/mm3      Lymphocytes, Absolute 2.35 10*3/mm3      Monocytes, Absolute 1.22 10*3/mm3      Eosinophils, Absolute 0.73 10*3/mm3      Basophils, Absolute 0.06 10*3/mm3      Immature Grans, Absolute 0.10 10*3/mm3      nRBC 0.0 /100 WBC     POC Glucose Once [[id number]]  (Abnormal) Collected: 03/28/23 2112    Specimen: Blood Updated: 03/28/23 2114     Glucose 204 mg/dL       Comment: Meter: [id number] : [id number] Arthur HILLS       POC Glucose Once [[id number]]  (Abnormal) Collected: 03/28/23 1459    Specimen: Blood Updated: 03/28/23 1500     Glucose 140 mg/dL      Comment: Meter: [id number] : [id number] Jackelyn Benz RN       POC Glucose Once [[id number]]  (Abnormal) Collected: 03/28/23 0948    Specimen: Blood Updated: 03/28/23 0950     Glucose 134 mg/dL      Comment: Meter: [id number] : [id number] [place] Gloria BREE       Tissue / Bone Culture - Surgical Site, Toe, Left [[id number]]  (Abnormal)  (Susceptibility) Collected: 03/24/23 1436    Specimen: Surgical Site from Toe, Left Updated: 03/28/23 0722     Tissue Culture Light growth (2+) Streptococcus anginosus      Rare Escherichia coli     Gram Stain Moderate (3+) WBCs per low power field      Few (2+) Gram positive cocci    Susceptibility        Streptococcus anginosus      RAE      Ceftriaxone Susceptible      Penicillin G Susceptible      Vancomycin Susceptible                       Susceptibility        Escherichia coli      RAE      Ampicillin Susceptible      Ampicillin + Sulbactam Susceptible      Cefepime Susceptible      Ceftazidime Susceptible      Ceftriaxone Susceptible      Gentamicin Susceptible      Levofloxacin Susceptible      Piperacillin + Tazobactam Susceptible      Tetracycline Susceptible      Trimethoprim + Sulfamethoxazole Susceptible                       Susceptibility Comments       Escherichia coli    Cefazolin sensitivity will not be reported for Enterobacteriaceae in non-urine isolates. If cefazolin is preferred, please call the microbiology lab to request an E-test.  With the exception of urinary-sourced infections, aminoglycosides should not be used as monotherapy.               Hemoglobin A1c [[id number]]  (Abnormal) Collected: 03/28/23 0457    Specimen: Blood Updated: 03/28/23 0615     Hemoglobin A1C 7.80 %     Narrative:      Hemoglobin A1C Ranges:    Increased Risk for Diabetes   5.7% to 6.4%  Diabetes                     >= 6.5%  Diabetic Goal                < 7.0%    POC Glucose Once [[id number]]  (Normal) Collected: 03/28/23 0600    Specimen: Blood Updated: 03/28/23 0606     Glucose 119 mg/dL      Comment: Meter: [id number] : [id number] Pete HILLS       Folate [[id number]]  (Normal) Collected: 03/28/23 0457    Specimen: Blood Updated: 03/28/23 0551     Folate 6.15 ng/mL     Narrative:      Results may be falsely increased if patient taking Biotin.      Vitamin B12 [[id number]]  (Abnormal) Collected: 03/28/23 0457    Specimen: Blood Updated: 03/28/23 0551     Vitamin B-12 1,079 pg/mL     Narrative:      Results may be falsely increased if patient taking Biotin.      Ferritin [[id number]]  (Abnormal) Collected: 03/28/23 0457    Specimen: Blood Updated: 03/28/23 0540     Ferritin 779.00 ng/mL     Narrative:      Results may be falsely decreased if patient taking Biotin.      Iron Profile [[id number]]  (Abnormal) Collected: 03/28/23 0457    Specimen: Blood Updated: 03/28/23 0537     Iron 35 mcg/dL      Iron Saturation 12 %      Transferrin 201 mg/dL      TIBC 299 mcg/dL     Comprehensive Metabolic Panel [[id number]]  (Abnormal) Collected: 03/28/23 0457    Specimen: Blood Updated: 03/28/23 0537     Glucose 126 mg/dL      BUN 30 mg/dL      Creatinine 2.16 mg/dL      Sodium 138 mmol/L      Potassium 4.5 mmol/L      Chloride 105 mmol/L      CO2 26.5 mmol/L      Calcium 9.4 mg/dL      Total Protein 7.2 g/dL      Albumin 3.1 g/dL      ALT (SGPT) 22 U/L      AST (SGOT) 33 U/L      Alkaline Phosphatase 157 U/L      Total Bilirubin 0.6 mg/dL      Globulin 4.1 gm/dL      A/G Ratio 0.8 g/dL      BUN/Creatinine Ratio 13.9     Anion Gap 6.5 mmol/L      eGFR 31.3 mL/min/1.73     Narrative:      GFR Normal >60  Chronic Kidney Disease <60  Kidney Failure <15    The GFR formula is only valid for adults with stable renal function between ages 18 and 70.    Magnesium [[id number]]  (Normal) Collected: 03/28/23  0457    Specimen: Blood Updated: 03/28/23 0537     Magnesium 1.8 mg/dL     CBC (No Diff) [[id number]]  (Abnormal) Collected: 03/28/23 0457    Specimen: Blood Updated: 03/28/23 0517     WBC 10.80 10*3/mm3      RBC 4.00 10*6/mm3      Hemoglobin 11.3 g/dL      Hematocrit 34.0 %      MCV 85.0 fL      MCH 28.3 pg      MCHC 33.2 g/dL      RDW 13.6 %      RDW-SD 42.0 fl      MPV 12.4 fL      Platelets 259 10*3/mm3     POC Glucose Once [[id number]]  (Abnormal) Collected: 03/27/23 2042    Specimen: Blood Updated: 03/27/23 2051     Glucose 138 mg/dL      Comment: Meter: [id number] : [id number] Pete HILLS       Blood Culture - Blood, Arm, Right [[id number]]  (Normal) Collected: 03/22/23 1830    Specimen: Blood from Arm, Right Updated: 03/27/23 1845     Blood Culture No growth at 5 days    Blood Culture - Blood, Arm, Left [[id number]]  (Normal) Collected: 03/22/23 1830    Specimen: Blood from Arm, Left Updated: 03/27/23 1845     Blood Culture No growth at 5 days    POC Glucose Once [[id number]]  (Abnormal) Collected: 03/27/23 1551    Specimen: Blood Updated: 03/27/23 1553     Glucose 176 mg/dL      Comment: Meter: [id number] : [id number] Sd Bartonus NA       POC Glucose Once [[id number]]  (Abnormal) Collected: 03/27/23 1044    Specimen: Blood Updated: 03/27/23 1046     Glucose 146 mg/dL      Comment: Meter: [id number] : [id number] [place] Att[place]cus NA       POC Glucose Once [[id number]]  (Normal) Collected: 03/27/23 0642    Specimen: Blood Updated: 03/27/23 0645     Glucose 100 mg/dL      Comment: Meter: [id number] : [id number] Rajiv HILLS       Creatinine Serum (kidney function) GFR component [[id number]]  (Abnormal) Collected: 03/27/23 0430    Specimen: Blood Updated: 03/27/23 0551     Creatinine 2.11 mg/dL      eGFR 32.2 mL/min/1.73     Narrative:      GFR Normal >60  Chronic Kidney Disease <60  Kidney Failure <15    The GFR formula is only valid for adults with stable renal function between ages 18 and  70.    POC Glucose Once [[id number]]  (Abnormal) Collected: 03/26/23 2056    Specimen: Blood Updated: 03/26/23 2057     Glucose 188 mg/dL      Comment: Meter: [id number] : [id number] [place] Camryn REINIER       POC Glucose Once [[id number]]  (Abnormal) Collected: 03/26/23 1609    Specimen: Blood Updated: 03/26/23 1611     Glucose 164 mg/dL      Comment: Meter: [id number] : [id number] [place] Carmencita NA       POC Glucose Once [[id number]]  (Abnormal) Collected: 03/26/23 1203    Specimen: Blood Updated: 03/26/23 1204     Glucose 145 mg/dL      Comment: Meter: [id number] : [id number] B[place] Carmencita NA       POC Glucose Once [[id number]]  (Abnormal) Collected: 03/26/23 0603    Specimen: Blood Updated: 03/26/23 0604     Glucose 159 mg/dL      Comment: Meter: [id number] : [id number] Dean Kishan REINIER       Basic Metabolic Panel [[id number]]  (Abnormal) Collected: 03/26/23 0455    Specimen: Blood Updated: 03/26/23 0532     Glucose 176 mg/dL      BUN 47 mg/dL      Creatinine 2.30 mg/dL      Sodium 135 mmol/L      Potassium 4.1 mmol/L      Chloride 105 mmol/L      CO2 22.0 mmol/L      Calcium 9.2 mg/dL      BUN/Creatinine Ratio 20.4     Anion Gap 8.0 mmol/L      eGFR 29.1 mL/min/1.73     Narrative:      GFR Normal >60  Chronic Kidney Disease <60  Kidney Failure <15    The GFR formula is only valid for adults with stable renal function between ages 18 and 70.    CBC & Differential [[id number]]  (Abnormal) Collected: 03/26/23 0455    Specimen: Blood Updated: 03/26/23 0515    Narrative:      The following orders were created for panel order CBC & Differential.  Procedure                               Abnormality         Status                     ---------                               -----------         ------                     CBC Auto Differential[[id number]]        Abnormal            Final result                 Please view results for these tests on the individual orders.    CBC Auto Differential [[id number]]   (Abnormal) Collected: 03/26/23 0455    Specimen: Blood Updated: 03/26/23 0515     WBC 10.15 10*3/mm3      RBC 3.77 10*6/mm3      Hemoglobin 10.6 g/dL      Hematocrit 32.3 %      MCV 85.7 fL      MCH 28.1 pg      MCHC 32.8 g/dL      RDW 13.9 %      RDW-SD 43.4 fl      MPV 12.6 fL      Platelets 214 10*3/mm3      Neutrophil % 68.0 %      Lymphocyte % 19.0 %      Monocyte % 8.7 %      Eosinophil % 3.5 %      Basophil % 0.3 %      Immature Grans % 0.5 %      Neutrophils, Absolute 6.90 10*3/mm3      Lymphocytes, Absolute 1.93 10*3/mm3      Monocytes, Absolute 0.88 10*3/mm3      Eosinophils, Absolute 0.36 10*3/mm3      Basophils, Absolute 0.03 10*3/mm3      Immature Grans, Absolute 0.05 10*3/mm3      nRBC 0.0 /100 WBC     POC Glucose Once [[id number]]  (Abnormal) Collected: 03/25/23 2058    Specimen: Blood Updated: 03/25/23 2059     Glucose 231 mg/dL      Comment: Meter: [id number] : [id number] Dean HILLS       Vancomycin, Trough [[id number]]  (Normal) Collected: 03/25/23 1725    Specimen: Blood Updated: 03/25/23 1807     Vancomycin Trough 12.00 mcg/mL     Narrative:      Therapeutic Ranges for Vancomycin    Vancomycin Random   5.0-40.0 mcg/mL  Vancomycin Trough   5.0-20.0 mcg/mL  Vancomycin Peak     20.0-40.0 mcg/mL    POC Glucose Once [[id number]]  (Abnormal) Collected: 03/25/23 1622    Specimen: Blood Updated: 03/25/23 1633     Glucose 189 mg/dL      Comment: Meter: [id number] : [id number] Corrales Adriel NA       POC Glucose Once [[id number]]  (Abnormal) Collected: 03/25/23 1122    Specimen: Blood Updated: 03/25/23 1127     Glucose 212 mg/dL      Comment: Meter: [id number] : [id number] Corrales Adriel NA       POC Glucose Once [[id number]]  (Abnormal) Collected: 03/25/23 0625    Specimen: Blood Updated: 03/25/23 0625     Glucose 221 mg/dL      Comment: Meter: [id number] : [id number] Juan Betty REINIER       Basic Metabolic Panel [[id number]]  (Abnormal) Collected: 03/25/23 0523    Specimen: Blood  Updated: 03/25/23 0612     Glucose 235 mg/dL      BUN 43 mg/dL      Creatinine 2.25 mg/dL      Sodium 137 mmol/L      Potassium 4.3 mmol/L      Chloride 104 mmol/L      CO2 22.0 mmol/L      Calcium 9.0 mg/dL      BUN/Creatinine Ratio 19.1     Anion Gap 11.0 mmol/L      eGFR 29.8 mL/min/1.73     Narrative:      GFR Normal >60  Chronic Kidney Disease <60  Kidney Failure <15    The GFR formula is only valid for adults with stable renal function between ages 18 and 70.    CBC & Differential [[id number]]  (Abnormal) Collected: 03/25/23 0523    Specimen: Blood Updated: 03/25/23 0556    Narrative:      The following orders were created for panel order CBC & Differential.  Procedure                               Abnormality         Status                     ---------                               -----------         ------                     CBC Auto Differential[[id number]]        Abnormal            Final result                 Please view results for these tests on the individual orders.    CBC Auto Differential [[id number]]  (Abnormal) Collected: 03/25/23 0523    Specimen: Blood Updated: 03/25/23 0556     WBC 12.02 10*3/mm3      RBC 3.71 10*6/mm3      Hemoglobin 10.9 g/dL      Hematocrit 31.4 %      MCV 84.6 fL      MCH 29.4 pg      MCHC 34.7 g/dL      RDW 13.3 %      RDW-SD 40.6 fl      MPV 13.0 fL      Platelets 193 10*3/mm3      Neutrophil % 86.6 %      Lymphocyte % 6.9 %      Monocyte % 5.7 %      Eosinophil % 0.1 %      Basophil % 0.1 %      Immature Grans % 0.6 %      Neutrophils, Absolute 10.41 10*3/mm3      Lymphocytes, Absolute 0.83 10*3/mm3      Monocytes, Absolute 0.69 10*3/mm3      Eosinophils, Absolute 0.01 10*3/mm3      Basophils, Absolute 0.01 10*3/mm3      Immature Grans, Absolute 0.07 10*3/mm3      nRBC 0.0 /100 WBC     POC Glucose Once [[id number]]  (Abnormal) Collected: 03/24/23 2059    Specimen: Blood Updated: 03/24/23 2100     Glucose 210 mg/dL      Comment: Meter: [id number] : [id number]  "Juan Hernández NA       POC Glucose Once [[id number]]  (Abnormal) Collected: 03/24/23 1646    Specimen: Blood Updated: 03/24/23 1648     Glucose 209 mg/dL      Comment: Meter: [id number] : [id number] Memo Grey NA       POC Glucose Once [[id number]]  (Abnormal) Collected: 03/24/23 1510    Specimen: Blood Updated: 03/24/23 1511     Glucose 180 mg/dL      Comment: Meter: [id number] : [id number] Karla Galarza RN             /52 (BP Location: Right arm, Patient Position: Sitting)   Pulse 58   Temp 98.5 °F (36.9 °C) (Oral)   Resp 18   Ht 190.5 cm (75\")   Wt 118 kg (260 lb 9.3 oz)   SpO2 96%   BMI 32.57 kg/m²     Discharge Exam:  General Appearance:    Alert, cooperative, no distress                          Head:    Normocephalic, without obvious abnormality, atraumatic                          Eyes:                            Throat:   Lips, tongue, gums normal                          Neck:   Supple, symmetrical, trachea midline, no JVD                        Lungs:     Clear to auscultation bilaterally, respirations unlabored                Chest Wall:    No tenderness or deformity                        Heart:    Regular rate and rhythm, S1 and S2 normal, no murmur,no  Rub  or gallop                  Abdomen:     Soft, non-tender, bowel sounds active, no masses, no organomegaly                  Extremities:   Extremities normal, left foot with wound VAC in place, no cyanosis or edema                             Skin:   Skin is warm and dry,  no rashes or palpable lesions                  Neurologic:   no focal deficits noted     Disposition:  Home with home health    Activity as tolerated    Diet as tolerated  Diet Order   Procedures    Diet: Cardiac Diets, Diabetic Diets; Healthy Heart (2-3 Na+); Consistent Carbohydrate; Texture: Regular Texture (IDDSI 7); Fluid Consistency: Thin (IDDSI 0)       Patient Instructions:      Discharge Medications        New Medications        Instructions " Start Date   HYDROcodone-acetaminophen 7.5-325 MG per tablet  Commonly known as: NORCO  Replaces: HYDROcodone-acetaminophen  MG per tablet   1 tablet, Oral, Every 4 Hours PRN      levoFLOXacin 750 MG tablet  Commonly known as: LEVAQUIN   750 mg, Oral, Every Other Day   Start Date: April 1, 2023     sodium hypochlorite in sterile water irrigation topical solution 0.0625%   946 mL, Topical, As Needed             Changes to Medications        Instructions Start Date   pregabalin 200 MG capsule  Commonly known as: LYRICA  What changed: Another medication with the same name was removed. Continue taking this medication, and follow the directions you see here.   Take 1 capsule by mouth.             Continue These Medications        Instructions Start Date   Accu-Chek FastClix Lancets misc   TEST BEFORE MEALS AND AT BEDTIME      Accu-Chek Guide test strip  Generic drug: glucose blood   TEST BEFORE MEALS AND AT BEDTIME      glucose blood test strip  Commonly known as: Accu-Chek Guide   Test BS three times daily.  DX E11.65      allopurinol 100 MG tablet  Commonly known as: ZYLOPRIM   100 mg, Oral, Every Evening      amitriptyline 50 MG tablet  Commonly known as: ELAVIL   TAKE ONE (1) TABLET BY MOUTH EVERY NIGHT AT BEDTIME      amLODIPine 10 MG tablet  Commonly known as: NORVASC   10 mg, Oral, Every Morning      atorvastatin 40 MG tablet  Commonly known as: LIPITOR   40 mg, Oral, Nightly      bimatoprost 0.03 % ophthalmic drops  Commonly known as: LUMIGAN   1 drop, Both Eyes, Nightly      Lumigan 0.01 % ophthalmic drops  Generic drug: bimatoprost   No dose, route, or frequency recorded.      DULoxetine 60 MG capsule  Commonly known as: CYMBALTA   No dose, route, or frequency recorded.      Easy Touch Pen Needles 31G X 6 MM misc  Generic drug: Insulin Pen Needle   1 each, Other, 4 Times Daily      fenofibrate 160 MG tablet   160 mg, Oral, Daily      ferrous sulfate 324 (65 Fe) MG tablet delayed-release EC tablet   324  mg, Oral, Daily With Breakfast, HOLD FOR SURGERY      finasteride 5 MG tablet  Commonly known as: PROSCAR   TAKE ONE (1) TABLET BY MOUTH EVERY DAY      losartan-hydrochlorothiazide 100-25 MG per tablet  Commonly known as: HYZAAR   1 tablet, Oral, Daily      nebivolol 10 MG tablet  Commonly known as: BYSTOLIC   10 mg, Oral, Every Morning      NovoLOG FlexPen 100 UNIT/ML solution pen-injector sc pen  Generic drug: insulin aspart   7 Units, Subcutaneous, 3 Times Daily Before Meals      omeprazole 40 MG capsule  Commonly known as: priLOSEC   40 mg, Oral, Nightly      Semaglutide (1 MG/DOSE) 2 MG/1.5ML solution pen-injector  Commonly known as: OZEMPIC   1 mg, Subcutaneous, Weekly      Ozempic (1 MG/DOSE) 4 MG/3ML solution pen-injector  Generic drug: Semaglutide (1 MG/DOSE)   INJECT 1 MG ONCE WEEKLY      tamsulosin 0.4 MG capsule 24 hr capsule  Commonly known as: FLOMAX   TAKE ONE (1) CAPSULE BY MOUTH EVERY DAY      terazosin 2 MG capsule  Commonly known as: HYTRIN   2 mg, Oral, Every Night at Bedtime      tiZANidine 4 MG tablet  Commonly known as: ZANAFLEX   4 mg, Oral, Nightly      traZODone 100 MG tablet  Commonly known as: DESYREL   100 mg, Oral, Nightly, 2 tablet at night      Tresiba FlexTouch 100 UNIT/ML solution pen-injector injection  Generic drug: insulin degludec   20 Units, Subcutaneous, Nightly      Vitamin D3 50 MCG (2000 UT) capsule   TAKE ONE (1) CAPSULE BY MOUTH EVERY DAY             Stop These Medications      clotrimazole 1 % cream  Commonly known as: LOTRIMIN     HYDROcodone-acetaminophen  MG per tablet  Commonly known as: NORCO  Replaced by: HYDROcodone-acetaminophen 7.5-325 MG per tablet     triamcinolone 0.5 % cream  Commonly known as: KENALOG            Future Appointments   Date Time Provider Department Center   5/3/2023 10:50 AM Mauricio Yates DO [facility]     Additional Instructions for the Follow-ups that You Need to Schedule       Ambulatory Referral to Home Health  (Hospital)   As directed      Face to Face Visit Date: 3/31/2023    Follow-up provider for Plan of Care?: I treated the patient in an acute care facility and will not continue treatment after discharge.    Follow-up provider: ZOYA CLAROS [[id number]]    Reason/Clinical Findings: weak    Describe mobility limitations that make leaving home difficult: left foot infection with wound vac    Nursing/Therapeutic Services Requested: Skilled Nursing    Skilled nursing orders: Wound care dressing/changes    Frequency: 1 Week 1                Contact information for follow-up providers       Baltazar Oh MD Follow up in 1 month(s).    Specialty: Vascular Surgery  Why: For wound re-check  Contact information:  [address]  [facility] [address]  [phone]               Zoya Claros PA Follow up in 1 week(s).    Specialty: Physician Assistant  Contact information:  [address] [place] [id number] [phone]                       Contact information for after-discharge care       Home Medical Care       [facility] .    Service: Home Nursing  Contact information:  [address], [address]  [facility] [address] [phone]                                 Discharge Order (From admission, onward)      None          Discharge Order (From admission, onward)       Start     Ordered    03/31/23 1501  Discharge patient  Once        Expected Discharge Date: 03/31/23    Discharge Disposition: Home-Health Care [facility]    Physician of Record for Attribution - Please select from Treatment Team: AIDE CHAUDHARY [[id number]]    Review needed by CMO to determine Physician of Record: No       Question Answer Comment   Physician of Record for Attribution - Please select from Treatment Team AIDE CHAUDHARY    Review needed by CMO to determine Physician of Record No        03/31/23 1507                    Total time spent discharging patient including evaluation,post  hospitalization follow up,  medication and post hospitalization instructions and education total time exceeds 30 minutes.    Signed:  Mahin Armijo MD  3/31/2023  15:08 EDT

## 2023-03-31 NOTE — PLAN OF CARE
Goal Outcome Evaluation:  Plan of Care Reviewed With: patient        Progress: no change  Outcome Evaluation: VSS. Alert and oriented x4. Ambulated to toilet x1 assist with walker. LLE pain treated with prn pain meds. Wound vac in place to LLE. Will continue to provide supportive care. Plans for d/c today.     Pt voided x2 this shift. UOP not recorded the first time but PVR 0. The second time PVR 75. No indication for lee catheter replacement at this time.

## 2023-03-31 NOTE — THERAPY TREATMENT NOTE
Patient Name: Tomy Manjarrez  : 1950    MRN: [id number]                              Today's Date: 3/31/2023       Admit Date: 3/22/2023    Visit Dx:     ICD-10-CM ICD-9-CM   1. Cellulitis of left foot  L03.116 682.7   2. Diabetic foot infection (HCC)  E11.628 250.80    L08.9 686.9     Patient Active Problem List   Diagnosis   • Left renal mass   • Type 2 diabetes mellitus with hyperglycemia, with long-term current use of insulin (HCC)   • Hypertension   • Diabetic neuropathy (HCC)   • Alcohol use   • Essential hypertension   • Chronic renal insufficiency, stage III (moderate) (HCC)   • Prostatocystitis   • Hyperglycemia due to type 2 diabetes mellitus (HCC)   • Diabetic peripheral neuropathy associated with type 2 diabetes mellitus (HCC)   • Stage 3b chronic kidney disease (HCC)   • Mixed hyperlipidemia   • Diabetic peripheral neuropathy (HCC)   • Stage 3b chronic kidney disease (HCC)   • Diabetic peripheral neuropathy (HCC)   • Essential hypertension   • Type 2 diabetes mellitus with hyperglycemia, with long-term current use of insulin (HCC)   • BPH with urinary obstruction   • Clear cell carcinoma of kidney (HCC)   • Cellulitis of left foot   • Diarrhea     Past Medical History:   Diagnosis Date   • Arthritis    • BPH (benign prostatic hyperplasia)    • Chronic back pain    • Chronic kidney disease (CKD), stage III (moderate) (HCC)    • Diabetes mellitus (HCC)     TYPE 2   • Difficulty urinating     REQUIRING STRAIGHT CATH   • GERD (gastroesophageal reflux disease)    • Gout    • Hyperlipidemia    • Hypertension    • Left kidney mass    • Neuropathy     IN FEET   • Neuropathy in diabetes (HCC)    • Type 2 diabetes mellitus (HCC)      Past Surgical History:   Procedure Laterality Date   • CARDIAC CATHETERIZATION     • COLONOSCOPY     • CYSTOSCOPY TRANSURETHRAL RESECTION OF PROSTATE N/A 2021    Procedure: CYSTOSCOPY TRANSURETHRAL RESECTION OF PROSTATE TRANSURETHRAL INCISION OF PROSTATE;  Surgeon:  Tres Mena MD;  Location: [facility];  Service: Urology;  Laterality: N/A;   • NEPHRECTOMY PARTIAL Left 11/19/2019    Procedure: LEFT PARTIAL NEPHRECTOMY X2, INTRAOPERATIVE RENAL ULTRASONOGRAPHY;  Surgeon: Tres Mena MD;  Location: [facility];  Service: Urology   • WISDOM TOOTH EXTRACTION        General Information     Row Name 03/31/23 1533          Physical Therapy Time and Intention    Document Type therapy note (daily note)  -CS     Mode of Treatment individual therapy;physical therapy  -CS     Row Name 03/31/23 1533          General Information    Patient Profile Reviewed yes  -CS     Prior Level of Function --  L darco shoe + wound vac  -CS     Existing Precautions/Restrictions fall;brace worn when out of bed  -CS     Row Name 03/31/23 1533          Cognition    Orientation Status (Cognition) oriented x 4  -CS     Row Name 03/31/23 1533          Safety Issues, Functional Mobility    Impairments Affecting Function (Mobility) balance;pain;range of motion (ROM);strength;endurance/activity tolerance;shortness of breath;postural/trunk control  -CS           User Key  (r) = Recorded By, (t) = Taken By, (c) = Cosigned By    Initials Name Provider Type    CS Michelle Sutton, PT Physical Therapist               Mobility     Row Name 03/31/23 1534          Bed Mobility    Comment, (Bed Mobility) NT; UIC  -     Row Name 03/31/23 1534          Sit-Stand Transfer    Sit-Stand [place] (Transfers) standby assist  -CS     Assistive Device (Sit-Stand Transfers) walker, front-wheeled  -CS     Comment, (Sit-Stand Transfer) c/o of dizziness when initially standing resulting in pt returning to sitting UIC; pt discouraged as he anticipated D/C today and declined further mobility - RN aware  -CS     Row Name 03/31/23 1534          Mobility    Extremity Weight-bearing Status left lower extremity  -CS     Left Lower Extremity (Weight-bearing Status) weight-bearing as tolerated  (WBAT);other (see comments)  heel WBAT  -CS           User Key  (r) = Recorded By, (t) = Taken By, (c) = Cosigned By    Initials Name Provider Type    CS Michelle Sutton, SHAILESH Physical Therapist               Obj/Interventions     Row Name 03/31/23 1536          Motor Skills    Therapeutic Exercise other (see comments)  discussed B LE HEP  -CS     Row Name 03/31/23 1536          Balance    Balance Assessment sitting static balance;sitting dynamic balance;standing static balance  -CS     Static Sitting Balance modified independence  -CS     Dynamic Sitting Balance modified independence  -CS     Position, Sitting Balance supported;sitting in chair  -CS     Static Standing Balance standby assist  -CS     Position/Device Used, Standing Balance supported;walker, front-wheeled  -CS           User Key  (r) = Recorded By, (t) = Taken By, (c) = Cosigned By    Initials Name Provider Type    Michelle Montanez, PT Physical Therapist               Goals/Plan    No documentation.                Clinical Impression     Row Name 03/31/23 1537          Pain    Pretreatment Pain Rating 0/10 - no pain  -CS     Posttreatment Pain Rating 0/10 - no pain  -CS     Row Name 03/31/23 1537          Plan of Care Review    Plan of Care Reviewed With patient  -CS     Progress no change  -CS     Outcome Evaluation Pt received UIC upon arrival and agreeable to PT. Pt stood requiring SBA and discussed management of portable wound vac. After standing pt had reports of dizziness and returned to sitting UIC. Pt states he just feels fatigued and discouraged as her anticipated to D/C home today. Pt declined further mobility. RN aware. PT encouraged pt to sit UIC and complete HEP throughout the day. PT provided education regarding home safety. Acute PT will f/u tomorrow if pt still here.  -CS     Row Name 03/31/23 1537          Therapy Assessment/Plan (PT)    Criteria for Skilled Interventions Met (PT) yes;meets criteria  -CS     Therapy Frequency (PT) 6  times/wk  -CS     Row Name 03/31/23 1537          Positioning and Restraints    Pre-Treatment Position sitting in chair/recliner  -CS     Post Treatment Position chair  -CS     In Chair notified nsg;sitting;call light within reach;encouraged to call for assist;exit alarm on  -CS           User Key  (r) = Recorded By, (t) = Taken By, (c) = Cosigned By    Initials Name Provider Type    Michelle Montanez, PT Physical Therapist               Outcome Measures     Row Name 03/31/23 1540          How much help from another person do you currently need...    Turning from your back to your side while in flat bed without using bedrails? 3  -CS     Moving from lying on back to sitting on the side of a flat bed without bedrails? 3  -CS     Moving to and from a bed to a chair (including a wheelchair)? 3  -CS     Standing up from a chair using your arms (e.g., wheelchair, bedside chair)? 4  -CS     Climbing 3-5 steps with a railing? 2  -CS     To walk in hospital room? 3  -CS     AM-PAC 6 Clicks Score (PT) 18  -CS     Highest level of mobility 6 --> Walked 10 steps or more  -CS     Row Name 03/31/23 1540          Functional Assessment    Outcome Measure Options AM-PAC 6 Clicks Basic Mobility (PT)  -CS           User Key  (r) = Recorded By, (t) = Taken By, (c) = Cosigned By    Initials Name Provider Type    Michelle Montanez, PT Physical Therapist                             Physical Therapy Education     Title: PT OT SLP Therapies (Done)     Topic: Physical Therapy (Done)     Point: Mobility training (Done)     Learning Progress Summary           Patient Acceptance, E,TB, VU,DU by  at 3/31/2023 1540    Acceptance, E,TB, VU,DU by  at 3/30/2023 1424    Acceptance, E,TB,D, VU,NR by  at 3/29/2023 1135    Acceptance, E,TB,D, VU,NR by  at 3/27/2023 1638                   Point: Home exercise program (Done)     Learning Progress Summary           Patient Acceptance, E,TB, VU,DU by  at 3/31/2023 1540    Acceptance, E,TB,  VU,DU by  at 3/30/2023 1424    Acceptance, E,TB,D, VU,NR by  at 3/29/2023 1135    Acceptance, E,TB,D, VU,NR by  at 3/27/2023 1638                   Point: Body mechanics (Done)     Learning Progress Summary           Patient Acceptance, E,TB, VU,DU by  at 3/31/2023 1540    Acceptance, E,TB, VU,DU by  at 3/30/2023 1424    Acceptance, E,TB,D, VU,NR by  at 3/29/2023 1135    Acceptance, E,TB,D, VU,NR by  at 3/27/2023 1638                   Point: Precautions (Done)     Learning Progress Summary           Patient Acceptance, E,TB, VU,DU by  at 3/31/2023 1540    Acceptance, E,TB, VU,DU by  at 3/30/2023 1424    Acceptance, E,TB,D, VU,NR by  at 3/29/2023 1135    Acceptance, E,TB,D, VU,NR by  at 3/27/2023 1638                               User Key     Initials Effective Dates Name Provider Type Discipline     06/16/21 -  Christelle Gomes, PT Physical Therapist PT     04/08/22 -  China Sandra, PT Physical Therapist PT     09/22/22 -  Michelle Sutton, SHAILESH Physical Therapist PT              PT Recommendation and Plan     Plan of Care Reviewed With: patient  Progress: no change  Outcome Evaluation: Pt received UIC upon arrival and agreeable to PT. Pt stood requiring SBA and discussed management of portable wound vac. After standing pt had reports of dizziness and returned to sitting UIC. Pt states he just feels fatigued and discouraged as her anticipated to D/C home today. Pt declined further mobility. RN aware. PT encouraged pt to sit UIC and complete HEP throughout the day. PT provided education regarding home safety. Acute PT will f/u tomorrow if pt still here.     Time Calculation:    PT Charges     Row Name 03/31/23 1540             Time Calculation    Start Time 1404  -CS      Stop Time 1413  -CS      Time Calculation (min) 9 min  -CS      PT Received On 03/31/23  -      PT - Next Appointment 04/01/23  -         Time Calculation- PT    Total Timed Code Minutes- PT 8 minute(s)  -          Timed Charges    20585 - PT Therapeutic Activity Minutes 8  -CS         Total Minutes    Timed Charges Total Minutes 8  -CS       Total Minutes 8  -CS            User Key  (r) = Recorded By, (t) = Taken By, (c) = Cosigned By    Initials Name Provider Type    CS Michelle Sutton, PT Physical Therapist              Therapy Charges for Today     Code Description Service Date Service Provider Modifiers Qty    16489731333 HC PT THERAPEUTIC ACT EA 15 MIN 3/30/2023 Michelle Sutton, PT GP 1    58831183251 HC PT THERAPEUTIC ACT EA 15 MIN 3/31/2023 Michelle Sutton, PT GP 1          PT G-Codes  Outcome Measure Options: AM-PAC 6 Clicks Basic Mobility (PT)  AM-PAC 6 Clicks Score (PT): 18  PT Discharge Summary  Anticipated Discharge Disposition (PT): home with 24/7 care, home with home health    Michelle Sutton, SHAILESH  3/31/2023

## 2023-03-31 NOTE — PROGRESS NOTES
FootName: Tomy Manjarrez ADMIT: 3/22/2023   : 1950  PCP: Zoya Claros PA    MRN: [id number] LOS: 9 days   AGE/SEX: 73 y.o. male  ROOM: [address]    [place], [facility] [employer]    Chief Complaint   Patient presents with   • Foot Pain   • Numbness     CC: Foot I&D follow-up peripheral arterial disease follow-up  Subjective     73 y.o. male patient resting comfortably.  Tolerating VAC change without pain    Review of Systems minimal foot pain    Objective     Scheduled Medications:   allopurinol, 100 mg, Oral, Q PM  amitriptyline, 25 mg, Oral, Nightly  amLODIPine, 10 mg, Oral, QAM  atorvastatin, 40 mg, Oral, Nightly  DULoxetine, 60 mg, Oral, Daily  enoxaparin, 40 mg, Subcutaneous, Daily  fenofibrate, 145 mg, Oral, Daily  ferrous sulfate, 325 mg, Oral, Daily With Lunch  finasteride, 5 mg, Oral, Daily  losartan, 100 mg, Oral, Q24H   And  hydroCHLOROthiazide, 25 mg, Oral, Q24H  insulin glargine, 30 Units, Subcutaneous, Nightly  insulin lispro, 0-14 Units, Subcutaneous, TID AC  levoFLOXacin, 750 mg, Oral, Every Other Day  nebivolol, 10 mg, Oral, QAM  pantoprazole, 40 mg, Oral, Q AM  pregabalin, 200 mg, Oral, Daily  sodium chloride, 10 mL, Intravenous, Q12H  tamsulosin, 0.4 mg, Oral, Daily  tiZANidine, 4 mg, Oral, Nightly  traZODone, 100 mg, Oral, Nightly        Active Infusions:  lactated ringers, 9 mL/hr, Last Rate: 9 mL/hr (23 1302)  sodium chloride, 9 mL/hr        As Needed Medications:  •  acetaminophen  •  dextrose  •  dextrose  •  glucagon (human recombinant)  •  HYDROcodone-acetaminophen  •  HYDROmorphone **AND** naloxone  •  nitroglycerin  •  ondansetron **OR** ondansetron  •  sodium chloride  •  sodium chloride  •  sodium chloride  •  sodium hypochlorite (DAKIN'S 1/8 Strength) 0.0625% topical solution (VERAFLO)    Vital Signs  Vital Signs   Patient Vitals for the past 24 hrs:   BP Temp Temp src Pulse Resp SpO2 Weight   23 0700 109/60 98 °F (36.7 °C) Oral 58 18 97  % --   03/31/23 0548 124/70 -- -- 63 -- -- 118 kg (260 lb 9.3 oz)   03/30/23 2300 129/46 98 °F (36.7 °C) Oral 65 18 92 % --   03/30/23 1900 128/73 98.1 °F (36.7 °C) Oral 72 18 94 % --   03/30/23 1535 134/73 98.9 °F (37.2 °C) Oral 65 16 98 % --   03/30/23 1054 121/61 98.8 °F (37.1 °C) Oral 65 18 96 % --     I/O:  I/O last 3 completed shifts:  In: 400 [P.O.:400]  Out: 3250 [Urine:3250]    Physical Exam:  Physical Exam   Foot wound with viable tissue but some pus still expressible.    Results Review:     CBC    Results from last 7 days   Lab Units 03/30/23  0438 03/29/23  0550 03/28/23  0457 03/26/23  0455 03/25/23  0523   WBC 10*3/mm3 10.08 11.53* 10.80 10.15 12.02*   HEMOGLOBIN g/dL 11.0* 10.8* 11.3* 10.6* 10.9*   PLATELETS 10*3/mm3 318 275 259 214 193     BMP   Results from last 7 days   Lab Units 03/30/23  0438 03/29/23  0550 03/28/23  0457 03/27/23  0430 03/26/23  0455 03/25/23  0523   SODIUM mmol/L 139 139 138  --  135* 137   POTASSIUM mmol/L 4.3 4.2 4.5  --  4.1 4.3   CHLORIDE mmol/L 104 105 105  --  105 104   CO2 mmol/L 28.0 26.4 26.5  --  22.0 22.0   BUN mg/dL 28* 25* 30*  --  47* 43*   CREATININE mg/dL 2.00* 1.94* 2.16* 2.11* 2.30* 2.25*   GLUCOSE mg/dL 128* 124* 126*  --  176* 235*   MAGNESIUM mg/dL  --  1.8 1.8  --   --   --        Assessment & Plan     Assessment & Plan      Cellulitis of left foot    Type 2 diabetes mellitus with hyperglycemia, with long-term current use of insulin (HCC)    Hypertension    Essential hypertension    Stage 3b chronic kidney disease (HCC)    BPH with urinary obstruction    Clear cell carcinoma of kidney (HCC)    Diarrhea        73 y.o. male left diabetic foot infection with mal perforans ulcer.  Postop day 6 status post I&D of foot wound.  Angioplasty and redebridement performed 3 days ago.  Doing extremely well with foot wound that appears to be viable and healing now.  No expressible pus.  Good bleeding.  From our standpoint home with IV antibiotics x6 weeks and wound VAC  is our plan.  We will follow him up in 1 month for wound check.  We will sign off.  Thanks        Baltazar Oh MD  03/31/23  07:56 EDT    Please call my office with any question: [phone]    Active Hospital Problems    Diagnosis  POA   • **Cellulitis of left foot [L03.116]  Yes   • Diarrhea [R19.7]  No   • Clear cell carcinoma of kidney (HCC) [C64.9]  Yes   • BPH with urinary obstruction [N40.1, N13.8]  Yes   • Stage 3b chronic kidney disease (HCC) [N18.32]  Yes   • Essential hypertension [I10]  Yes   • Type 2 diabetes mellitus with hyperglycemia, with long-term current use of insulin (HCC) [E11.65, Z79.4]  Not Applicable   • Hypertension [I10]  Yes      Resolved Hospital Problems    Diagnosis Date Resolved POA   • MARTIN (acute kidney injury) (HCC) [N17.9] 03/30/2023 Yes   • Hyponatremia [E87.1] 03/30/2023 Yes

## 2023-04-02 ENCOUNTER — HOSPITAL ENCOUNTER (EMERGENCY)
Facility: HOSPITAL | Age: 73
Discharge: HOME | End: 2023-04-02
Attending: EMERGENCY MEDICINE | Admitting: EMERGENCY MEDICINE
Payer: MEDICARE

## 2023-04-02 VITALS
OXYGEN SATURATION: 96 % | BODY MASS INDEX: 32.33 KG/M2 | SYSTOLIC BLOOD PRESSURE: 136 MMHG | HEART RATE: 66 BPM | RESPIRATION RATE: 16 BRPM | WEIGHT: 260 LBS | HEIGHT: 75 IN | TEMPERATURE: 96.5 F | DIASTOLIC BLOOD PRESSURE: 93 MMHG

## 2023-04-02 DIAGNOSIS — Z51.89 ENCOUNTER FOR WOUND CARE: Primary | ICD-10-CM

## 2023-04-02 PROCEDURE — 99283 EMERGENCY DEPT VISIT LOW MDM: CPT

## 2023-04-02 NOTE — ED NOTES
Presents for LEFT foot pain and wound drainage. Wound vac from LEFT foot fell out last night. Was admitted to ICU last week and had drain placed. states it is due to come out tomorrow. foot is dry, cracked, and has purulent drainage leaking.

## 2023-04-02 NOTE — DISCHARGE INSTRUCTIONS
Continue all other care as before.  The wound care nurse that comes to your house tomorrow can put a new VAC dressing on there.

## 2023-04-02 NOTE — ED PROVIDER NOTES
EMERGENCY DEPARTMENT ENCOUNTER    Room Number:  37/37  Date seen:  4/2/2023  PCP: System, Provider Not In  Historian: Patient  Relevant information provided by sources other than the patient, review of external records, and social determinants of health may be included in the HPI section.      HPI:  Chief Complaint: Postop complication  Additional historical features obtained directly from: Family at the bedside state that the drain came out of his wound today and they are not sure why.  They did bring the wound VAC suction device with them.  Context: Tomy Manjarrez is a 73 y.o. male who presents to the ED c/o postop complication after debridement of left foot due to PAD.  Reviewing the records in epic, discharge summary from March 31 of this year by Dr. Armijo discusses that the patient had been admitted for an infection of the left foot which required debridement and wound VAC placement by vascular surgery.  He is also discharged home on IV antibiotics which she still taking.    Patient said that home health was arranged to come out to do his dressing changes every other day, and they were due to come tomorrow.  Somehow though when he woke up today the suction tubing had become disconnected from the VAC dressing and they came at the behest of the wound care nurse to they called.  Otherwise he had no complaints, no fevers, everything else is going okay.        Initial impression including social determinants which will impact the assessment initial impression appears to be that the patient will need some local wound care to the foot.  He was recently discharged in the hospital and is not complaining of anything else so I do not know that we need any diagnostic testing          PAST MEDICAL HISTORY  Active Ambulatory Problems     Diagnosis Date Noted   • Left renal mass 11/19/2019   • Type 2 diabetes mellitus with hyperglycemia, with long-term current use of insulin (HCC) 11/20/2019   • Hypertension 11/20/2019   •  Diabetic neuropathy 11/20/2019   • Alcohol use 11/20/2019   • Essential hypertension 08/17/2020   • Chronic renal insufficiency, stage III (moderate) 08/17/2020   • Prostatocystitis 08/17/2020   • Hyperglycemia due to type 2 diabetes mellitus 08/17/2020   • Diabetic peripheral neuropathy associated with type 2 diabetes mellitus 08/17/2020   • Stage 3b chronic kidney disease (HCC) 01/12/2021   • Mixed hyperlipidemia 01/12/2021   • Diabetic peripheral neuropathy 01/12/2021   • Stage 3b chronic kidney disease 11/20/2019   • Diabetic peripheral neuropathy 11/20/2019   • Essential hypertension 11/20/2019   • Type 2 diabetes mellitus with hyperglycemia, with long-term current use of insulin 11/20/2019   • BPH with urinary obstruction 09/20/2021   • Clear cell carcinoma of kidney 08/04/2022   • Cellulitis of left foot 03/22/2023   • Diarrhea 03/27/2023     Resolved Ambulatory Problems     Diagnosis Date Noted   • Hyponatremia 03/22/2023   • MARTIN (acute kidney injury) 03/27/2023     Past Medical History:   Diagnosis Date   • Arthritis    • BPH (benign prostatic hyperplasia)    • Chronic back pain    • Chronic kidney disease (CKD), stage III (moderate)    • Diabetes mellitus    • Difficulty urinating    • GERD (gastroesophageal reflux disease)    • Gout    • Hyperlipidemia    • Left kidney mass    • Neuropathy    • Neuropathy in diabetes    • Type 2 diabetes mellitus          PAST SURGICAL HISTORY  Past Surgical History:   Procedure Laterality Date   • CARDIAC CATHETERIZATION  2016   • COLONOSCOPY     • CYSTOSCOPY TRANSURETHRAL RESECTION OF PROSTATE N/A 9/20/2021    Procedure: CYSTOSCOPY TRANSURETHRAL RESECTION OF PROSTATE TRANSURETHRAL INCISION OF PROSTATE;  Surgeon: Tres Mena MD;  Location: [facility];  Service: Urology;  Laterality: N/A;   • NEPHRECTOMY PARTIAL Left 11/19/2019    Procedure: LEFT PARTIAL NEPHRECTOMY X2, INTRAOPERATIVE RENAL ULTRASONOGRAPHY;  Surgeon: Tres Mena MD;   Location: [facility] OR;  Service: Urology   • WISDOM TOOTH EXTRACTION           FAMILY HISTORY  Family History   Problem Relation Age of Onset   • Diabetes Paternal Grandmother    • Diabetes Paternal Grandfather    • Hypertension Maternal Grandmother    • Cancer Neg Hx    • Malig Hyperthermia Neg Hx          SOCIAL HISTORY  Social History     Socioeconomic History   • Marital status: Single   • Number of children: 2   • Years of education: 14   Tobacco Use   • Smoking status: Former     Years: 1.00     Types: Cigarettes     Quit date: 2008     Years since quitting: 15.2   • Smokeless tobacco: Never   Vaping Use   • Vaping Use: Never used   Substance and Sexual Activity   • Alcohol use: Not Currently     Alcohol/week: 14.0 - 21.0 standard drinks     Types: 14 - 21 Shots of liquor per week   • Drug use: Not Currently   • Sexual activity: Defer         ALLERGIES  Patient has no known allergies.          PHYSICAL EXAM  ED Triage Vitals   Temp Heart Rate Resp BP SpO2   04/02/23 1516 04/02/23 1516 04/02/23 1516 04/02/23 1526 04/02/23 1516   96.5 °F (35.8 °C) 69 16 116/60 95 %      Temp src Heart Rate Source Patient Position BP Location FiO2 (%)   04/02/23 1516 04/02/23 1516 -- -- --   Tympanic Monitor            Physical Exam      GENERAL: Awake and alert nontoxic-appearing  HENT: nares patent  EYES: no scleral icterus, PERRL, EOMI  CV: regular rhythm, normal rate, distal pulses symmetric and palpable  RESPIRATORY: normal effort  ABDOMEN: soft, nondistended nontender with normal bowel sound  MUSCULOSKELETAL: no deformity.  There is a circular wound VAC dressing over the dorsal medial left foot.  The suction tubing has been pulled out inadvertently from the dressing.  The tissue around the VAC dressing does not appear particularly erythematous or tender.  There is a little bit of mucopurulent drainage around the margins of the sponge and cap refill less than 2 seconds in all the toes.  NEURO: alert, moves all  "extremities, follows commands  PSYCH:  calm, cooperative  SKIN: warm, dry with no rash        LAB RESULTS  No results found for this or any previous visit (from the past 24 hour(s)).    Ordered the above labs and my independent interpretation of these results are discussed in the section labeled \"ED course\" below        RADIOLOGY  No Radiology Exams Resulted Within Past 24 Hours    Ordered the above noted radiological studies.  Independently interpreted by me in PACS.  My independent interpretation of these results are discussed in the section below labeled \"ED course\"        PROCEDURES  Procedures          MEDICATIONS GIVEN IN ER  Medications - No data to display                MEDICAL DECISION MAKING and INDEPENDENT INTERPRETATIONS      Everything documented below this statement is the \"ED course\" section which I have referred to above.  Everything discussed in the following section constitutes MY INDEPENDENT INTERPRETATIONS of the lab work, EKGs, and radiology studies, and all of my personal conversations with other providers, and all of my independent decision making regarding this patient are discussed below.      ED Course as of 04/02/23 2058   Sun Apr 02, 2023 2057 Unfortunately we do not have a certified wound care nurse here at this hour on the weekend.  Spoke with house supervisor and family.  The home health agency nurse is scheduled to come to their house tomorrow.  With that in mind we have agreed to place a saline wet-to-dry dressing on the foot in the lieu of the wound VAC for tonight.  Then, the wound care nurse can replace the wound VAC appropriately tomorrow and he can continue with his scheduled follow-up appointments [DP]   2057 I do not see any indication for additional testing or treatment at this time.  He will continue getting his IV antibiotics at home [DP]      ED Course User Index  [DP] Jayson Tran MD         Everything documented above with this statement, in the ED course section " "constitutes my independent interpretation of lab work EKG and radiology studies along with my personal conversations with other providers and my independent medical decision making.  This statement will not be repeated before each data point, but they are all my independent interpretation needs contained within the \"ED course\" section.             All appropriate hygiene and PPE requirements were satisfied with this patient encounter      FINAL DIAGNOSES  Final diagnoses:   Encounter for wound care           DISPOSITION  Discharge          Latest Documented Vital Signs:  As of 20:57 EDT  BP- 136/93 HR- 66 Temp- 96.5 °F (35.8 °C) (Tympanic) O2 sat- 96%        --    Please note that portions of this were completed with a voice recognition program.       Note Disclaimer: At [facility], we believe that sharing information builds trust and better relationships. You are receiving this note because you are receiving care at [facility] or recently visited. It is possible you will see health information before a provider has talked with you about it. This kind of information can be easy to misunderstand. To help you fully understand what it      Jayson Tran MD  04/02/23 2058    "

## 2023-04-02 NOTE — ED TRIAGE NOTES
Pt had left foot surgery last week.  Today his drain fell out of his foot    Patient was placed in face mask during first look triage.  Patient was wearing a face mask throughout encounter.  I wore personal protective equipment throughout the encounter.  Hand hygiene was performed before and after patient encounter.

## 2023-04-03 NOTE — CASE MANAGEMENT/SOCIAL WORK
Case Management Discharge Note      Final Note: Located within [facility] unable to accept.  Pt discharged home with [facility] and [facility] wound vac         Selected Continued Care - Discharged on 3/31/2023 Admission date: 3/22/2023 - Discharge disposition: Home-Health Care Svc    Destination    No services have been selected for the patient.              Durable Medical Equipment     Service Provider Selected Services Address Phone Fax Patient Preferred    [facility] Durable Medical Equipment [address] [address] [phone] --          Dialysis/Infusion    No services have been selected for the patient.              Home Medical Care     Service Provider Selected Services Address Phone Fax Patient Preferred    [facility] [address], [facility] [address], [facility] [address] [phone] --          Therapy    No services have been selected for the patient.              Community Resources    No services have been selected for the patient.              Community & DME    No services have been selected for the patient.                Selected Continued Care - Episodes Includes continued care and service providers with selected services from the active episodes listed below    [facility] Episode start date: 3/16/2023   There are no active outsourced providers for this episode.               Transportation Services  Private: Car    Final Discharge Disposition Code: 06 - home with home health care

## 2023-04-04 ENCOUNTER — HOME HEALTH ADMISSION (OUTPATIENT)
Dept: HOME HEALTH SERVICES | Facility: HOME HEALTHCARE | Age: 73
End: 2023-04-04
Payer: MEDICARE

## 2023-04-05 ENCOUNTER — READMISSION MANAGEMENT (OUTPATIENT)
Dept: CALL CENTER | Facility: HOSPITAL | Age: 73
End: 2023-04-05
Payer: MEDICARE

## 2023-04-05 NOTE — OUTREACH NOTE
General Surgery Week 1 Survey    Flowsheet Row Responses   [facility] patient discharged from? [place]   Does the patient have one of the following disease processes/diagnoses(primary or secondary)? General Surgery   Week 1 attempt successful? No   Unsuccessful attempts Attempt 1  [attempted pt and sister who deferred back to pt (declined at 2nd attempt)]          GARRETT RUBIO - Registered Nurse

## 2023-04-11 ENCOUNTER — READMISSION MANAGEMENT (OUTPATIENT)
Dept: CALL CENTER | Facility: HOSPITAL | Age: 73
End: 2023-04-11
Payer: MEDICARE

## 2023-04-11 NOTE — OUTREACH NOTE
General Surgery Week 2 Survey    Flowsheet Row Responses   [facility] patient discharged from? [place]   Does the patient have one of the following disease processes/diagnoses(primary or secondary)? General Surgery   Week 2 attempt successful? Yes   Call start time 1053   Call end time 1058   Discharge diagnosis Cellulitis of left foot    Person spoke with today (if not patient) and relationship son and patient   Meds reviewed with patient/caregiver? Yes   Does the patient have all medications related to this admission filled (includes all antibiotics, pain medications, etc.) Yes   Is the patient taking all medications as directed (includes completed medication regime)? Yes   Does the patient have a follow up appointment scheduled with their surgeon? Yes  [4/5/23]   Has the patient kept scheduled appointments due by today? N/A   Comments Appt with ID is on 5/3/23   What is the Home health agency?  [facility]   Has home health visited the patient within 72 hours of discharge? Unsure   What DME was ordered? Wound Vac per [facility]    Has all DME been delivered? Yes   Psychosocial issues? No   Did the patient receive a copy of their discharge instructions? Yes   Nursing interventions Reviewed instructions with patient   What is the patient's perception of their health status since discharge? Improving   Is the patient/caregiver able to teach back signs and symptoms of incisional infection? Increased drainage or bleeding, Fever   Is the patient/caregiver able to teach back steps to recovery at home? Eat a well-balance diet   Is the patient/caregiver able to teach back the hierarchy of who to call/visit for symptoms/problems? PCP, Specialist, Home health nurse, Urgent Care, ED, 911 Yes   Week 2 call completed? Yes   Is the patient interested in additional calls from an ambulatory ?  NOTE:  applies to high risk patients requiring additional follow-up. No          Karley BERMUDEZ - Registered Nurse

## 2023-04-19 ENCOUNTER — READMISSION MANAGEMENT (OUTPATIENT)
Dept: CALL CENTER | Facility: HOSPITAL | Age: 73
End: 2023-04-19
Payer: MEDICARE

## 2023-04-19 NOTE — OUTREACH NOTE
"General Surgery Week 3 Survey    Flowsheet Row Responses   [facility] patient discharged from? [place]   Does the patient have one of the following disease processes/diagnoses(primary or secondary)? General Surgery   Week 3 attempt successful? Yes   Call start time 1804   Call end time 1807   Discharge diagnosis Cellulitis of left foot    Is the patient taking all medications as directed (includes completed medication regime)? Yes   Does the patient have a follow up appointment scheduled with their surgeon? Yes   Has the patient kept scheduled appointments due by today? Yes   What is the Home health agency?  [facility]   Has home health visited the patient within 72 hours of discharge? Yes   Psychosocial issues? No   What is the patient's perception of their health status since discharge? Improving   Is the patient/caregiver able to teach back the hierarchy of who to call/visit for symptoms/problems? PCP, Specialist, Home health nurse, Urgent Care, ED, 911 Yes   Additional teach back comments Call was brief and states he is doing \"ok\".  Foot is healing    Week 3 call completed? Yes   Graduated/Revoked comments Denies questions or needs at this time.          Fátima TUCKER - Licensed Nurse  "

## 2023-05-03 ENCOUNTER — OFFICE VISIT (OUTPATIENT)
Dept: INFECTIOUS DISEASES | Facility: CLINIC | Age: 73
End: 2023-05-03
Payer: MEDICARE

## 2023-05-03 VITALS
HEART RATE: 72 BPM | SYSTOLIC BLOOD PRESSURE: 86 MMHG | DIASTOLIC BLOOD PRESSURE: 50 MMHG | RESPIRATION RATE: 20 BRPM | TEMPERATURE: 97.1 F

## 2023-05-03 DIAGNOSIS — L03.116 CELLULITIS OF LEFT FOOT: ICD-10-CM

## 2023-05-03 DIAGNOSIS — E11.42 DIABETIC PERIPHERAL NEUROPATHY: ICD-10-CM

## 2023-05-03 DIAGNOSIS — M86.172 OTHER ACUTE OSTEOMYELITIS OF LEFT FOOT: Primary | ICD-10-CM

## 2023-05-03 NOTE — PROGRESS NOTES
"Chief Complaint  Follow-up    Subjective        Tomy Manjarrez presents to [facility] INFECTIOUS DISEASES  History of Present Illness    Patient is a 73-year-old male recently seen in the hospital in the setting of left diabetic foot infection.  Here today for follow-up on antibiotic course.    Patient was seen in the hospital in the setting of a left diabetic foot wound that underwent irrigation and debridement on 3/24 by vascular surgery.  During that time he also had angiogram with angioplasty performed.  During this procedure the bone was removed and cultures grew strep anginosus and E. coli.  MRI from earlier in his stay had shown no evidence of osteomyelitis however extensive soft tissue gas showed extension to the second metatarsal head.  Patient was started on 750 mg of levofloxacin every other day for a 6-week course treating osteomyelitis and end date of 5/5.    Patient is here today for follow-up and reports he has been doing well with antibiotics.  He reports he has had no side effects that he is aware of.  Did have recent decrease in his blood pressure medicine due to low blood pressure readings.  Denies any difficulties with the foot wound and has had home health come out to address his wound VAC.  States it has been doing well with continued output.  Does report some dry skin.  Denies any fevers or chills.    Objective   Vital Signs:  BP (!) 86/50 Comment: States he was taken off his BP med Terazosin yesterday since BP has been low. Denies dizziness or headache.  Pulse 72   Temp 97.1 °F (36.2 °C)   Resp 20   Estimated body mass index is 32.5 kg/m² as calculated from the following:    Height as of 4/2/23: 190.5 cm (75\").    Weight as of 4/2/23: 118 kg (260 lb).         Physical Exam   Result Review :  The following data was reviewed by: Mauricio Yates DO on 05/03/2023:  Common labs        3/28/2023    04:57 3/29/2023    05:50 3/30/2023    04:38   Common Labs   Glucose 126  "  124   128     BUN 30   25   28     Creatinine 2.16   1.94   2.00     Sodium 138   139   139     Potassium 4.5   4.2   4.3     Chloride 105   105   104     Calcium 9.4   9.2   9.4     Albumin 3.1   3.3      Total Bilirubin 0.6   0.5      Alkaline Phosphatase 157   148      AST (SGOT) 33   30      ALT (SGPT) 22   18      WBC 10.80   11.53   10.08     Hemoglobin 11.3   10.8   11.0     Hematocrit 34.0   31.7   32.1     Platelets 259   275   318     Hemoglobin A1C 7.80         Data reviewed: Antibiotic monitoring labs, recent MRI, and hospital notes including operative note and cultures             Assessment and Plan   Diagnoses and all orders for this visit:    1. Other acute osteomyelitis of left foot (Primary)    2. Diabetic peripheral neuropathy    3. Cellulitis of left foot    Patient is now at the end of his 6-week course of levofloxacin targeting strep and E. coli grown from his operative debridement.   Plan to stop antibiotics tomorrow after his final dose of levofloxacin 750 mg daily.  He was counseled extensively on wound care and diabetes control to help prevent any further infections.  His wound VAC was not removed today and he has close follow-up with vascular surgery later this week.  At this point I think further antibiotics are going to do little and if anything further debridement versus amputation would be the next step.    I do have some significant concerns about his ability to completely heal this wound and his risk for ongoing infection.  This was discussed with him and his son.  Counseled on diet to help aid in blood sugar control.       I spent 44 minutes caring for Tomy on this date of service. This time includes time spent by me in the following activities:preparing for the visit, reviewing tests, obtaining and/or reviewing a separately obtained history, performing a medically appropriate examination and/or evaluation , counseling and educating the patient/family/caregiver, documenting  information in the medical record, independently interpreting results and communicating that information with the patient/family/caregiver and care coordination  Follow Up   No follow-ups on file.  Patient was given instructions and counseling regarding his condition or for health maintenance advice. Please see specific information pulled into the AVS if appropriate.

## 2023-05-24 RX ORDER — OMEPRAZOLE 40 MG/1
1 CAPSULE, DELAYED RELEASE ORAL EVERY 24 HOURS
COMMUNITY

## 2023-05-24 RX ORDER — PREGABALIN 300 MG/1
CAPSULE ORAL
COMMUNITY
Start: 2023-04-04

## 2023-05-24 RX ORDER — HYDROCODONE BITARTRATE AND ACETAMINOPHEN 10; 325 MG/1; MG/1
TABLET ORAL
COMMUNITY
Start: 2023-04-04

## 2023-05-24 RX ORDER — DOCUSATE SODIUM 100 MG/1
CAPSULE, LIQUID FILLED ORAL EVERY 24 HOURS
COMMUNITY

## 2023-05-24 RX ORDER — SIMVASTATIN 20 MG
TABLET ORAL EVERY 24 HOURS
COMMUNITY

## 2023-05-24 RX ORDER — AMLODIPINE BESYLATE 10 MG/1
TABLET ORAL EVERY 24 HOURS
COMMUNITY

## 2023-05-24 RX ORDER — ALLOPURINOL 100 MG/1
TABLET ORAL EVERY 24 HOURS
COMMUNITY

## 2023-05-30 ENCOUNTER — OFFICE VISIT (OUTPATIENT)
Dept: ENDOCRINOLOGY | Facility: CLINIC | Age: 73
End: 2023-05-30

## 2023-05-30 VITALS
OXYGEN SATURATION: 93 % | SYSTOLIC BLOOD PRESSURE: 110 MMHG | HEART RATE: 69 BPM | BODY MASS INDEX: 29.22 KG/M2 | DIASTOLIC BLOOD PRESSURE: 65 MMHG | WEIGHT: 235 LBS | HEIGHT: 75 IN

## 2023-05-30 DIAGNOSIS — E11.65 TYPE 2 DIABETES MELLITUS WITH HYPERGLYCEMIA, WITH LONG-TERM CURRENT USE OF INSULIN: Primary | ICD-10-CM

## 2023-05-30 DIAGNOSIS — N18.32 STAGE 3B CHRONIC KIDNEY DISEASE: ICD-10-CM

## 2023-05-30 DIAGNOSIS — Z79.4 TYPE 2 DIABETES MELLITUS WITH HYPERGLYCEMIA, WITH LONG-TERM CURRENT USE OF INSULIN: Primary | ICD-10-CM

## 2023-05-30 DIAGNOSIS — I10 ESSENTIAL HYPERTENSION: ICD-10-CM

## 2023-05-30 DIAGNOSIS — E11.42 DIABETIC PERIPHERAL NEUROPATHY: ICD-10-CM

## 2023-05-30 DIAGNOSIS — E78.2 MIXED HYPERLIPIDEMIA: ICD-10-CM

## 2023-05-30 LAB — GLUCOSE BLDC GLUCOMTR-MCNC: 230 MG/DL (ref 70–105)

## 2023-05-30 PROCEDURE — 82948 REAGENT STRIP/BLOOD GLUCOSE: CPT | Performed by: INTERNAL MEDICINE

## 2023-05-30 NOTE — PROGRESS NOTES
Endocrine Progress Note Outpatient     Patient Care Team:  Jeanette Langley APRN as PCP - General (Family Medicine)  Nati Scruggs MD as Consulting Physician (Endocrinology)  Juan Holbrook MD as Consulting Physician (Nephrology)    Chief Complaint: Follow-up type 2 diabetes    HPI: This is 73-year-old male with history of type 2 diabetes, hypertension, hyperlipidemia, CKD stage III disease is here for follow-up.    For type 2 diabetes: Is currently taking Tresiba 10 units sq daily, Ozempic 1 mg once a week along with glipizide 10 milligrams twice a day.  Not checking BS at home on regular basis.     Hypertension: Well-controlled    Hyperlipidemia: Currently taking fenofibrate and atorvastatin.    CKD stage III disease: Follows with nephrologist.    Past Medical History:   Diagnosis Date   • Arthritis    • BPH (benign prostatic hyperplasia)    • Chronic back pain    • Chronic kidney disease (CKD), stage III (moderate)    • Diabetes mellitus     TYPE 2   • Difficulty urinating     REQUIRING STRAIGHT CATH   • GERD (gastroesophageal reflux disease)    • Gout    • Hyperlipidemia    • Hypertension    • Left kidney mass    • Multiple endocrine neoplasia    • Neuropathy     IN FEET   • Neuropathy in diabetes    • Testosterone deficiency    • Type 2 diabetes mellitus    • Vitamin D deficiency        Social History     Socioeconomic History   • Marital status: Single   • Number of children: 2   • Years of education: 14   Tobacco Use   • Smoking status: Former     Packs/day: 1.00     Years: 15.00     Pack years: 15.00     Types: Cigarettes     Quit date: 8/10/2007     Years since quitting: 15.8   • Smokeless tobacco: Never   Vaping Use   • Vaping Use: Never used   Substance and Sexual Activity   • Alcohol use: Not Currently     Alcohol/week: 14.0 - 21.0 standard drinks     Types: 14 - 21 Shots of liquor per week     Comment: Quit y 5th,2022   • Drug use: Not Currently     Types: Hydrocodone   • Sexual activity: Not  Currently       Family History   Problem Relation Age of Onset   • Diabetes Paternal Grandmother    • Diabetes Paternal Grandfather    • Hypertension Maternal Grandmother    • Cancer Neg Hx    • Malig Hyperthermia Neg Hx        No Known Allergies    ROS:   Constitutional:  Denies fatigue, tiredness.    Eyes:  Denies change in visual acuity   HENT:  Denies nasal congestion or sore throat   Respiratory: denies cough, shortness of breath.   Cardiovascular:  denies chest pain, edema   GI:  Denies abdominal pain, nausea, vomiting.   Musculoskeletal:  Denies back pain or joint pain   Integument:  Denies dry skin and rash   Neurologic:  Denies headache, focal weakness or sensory changes   Endocrine:  Denies polyuria or polydipsia   Psychiatric:  Denies depression or anxiety      Vitals:    05/30/23 1436   BP: 110/65   Pulse: 69   SpO2: 93%     Body mass index is 29.37 kg/m².     Physical Exam:  GEN: NAD, conversant  EYES: EOMI, PERRL, no conjunctival erythema  NECK: no thyromegaly, full ROM   CV: RRR, no murmurs/rubs/gallops, no peripheral edema  LUNG: CTAB, no wheezes/rales/ronchi  SKIN: no rashes, no acanthosis  MSK: no deformities, full ROM of all extremities  NEURO: no tremors, DTR normal  PSYCH: AOX3, appropriate mood, affect normal  FEET: Has bunion on the left foot, has soft boot due to recent foot surgery.     Results Review:     I reviewed the patient's new clinical results.    Lab Results   Component Value Date    HGBA1C 7.80 (H) 03/28/2023    HGBA1C 11.50 (H) 08/04/2022    HGBA1C 6.8 (H) 01/18/2022      Lab Results   Component Value Date    GLUCOSE 128 (H) 03/30/2023    BUN 28 (H) 03/30/2023    CREATININE 2.00 (H) 03/30/2023    EGFRIFNONA 29 (L) 09/10/2021    EGFRIFAFRI 32 (L) 01/18/2022    BCR 14.0 03/30/2023    K 4.3 03/30/2023    CO2 28.0 03/30/2023    CALCIUM 9.4 03/30/2023    ALBUMIN 3.3 (L) 03/29/2023    LABIL2 1.1 07/01/2020    AST 30 03/29/2023    ALT 18 03/29/2023    CHOL 146 08/04/2022    TRIG 398 (H)  08/04/2022    LDL 55 08/04/2022    HDL 31 (L) 08/04/2022     Lab Results   Component Value Date    TSH 2.410 01/12/2021    FREET4 1.18 01/12/2021         Medication Review: Reviewed.       Current Outpatient Medications:   •  ACCU-CHEK FASTCLIX LANCETS misc, TEST BEFORE MEALS AND AT BEDTIME, Disp: 102 each, Rfl: 0  •  allopurinol (ZYLOPRIM) 100 MG tablet, Take 1 tablet by mouth Every Evening., Disp: , Rfl:   •  amitriptyline (ELAVIL) 50 MG tablet, TAKE ONE (1) TABLET BY MOUTH EVERY NIGHT AT BEDTIME, Disp: , Rfl:   •  amLODIPine (NORVASC) 10 MG tablet, Take 1 tablet by mouth Every Morning., Disp: , Rfl:   •  atorvastatin (LIPITOR) 40 MG tablet, Take 1 tablet by mouth Every Night., Disp: , Rfl:   •  bimatoprost (LUMIGAN) 0.03 % ophthalmic drops, Administer 1 drop to both eyes Every Night., Disp: , Rfl:   •  Cholecalciferol (Vitamin D3) 50 MCG (2000 UT) capsule, TAKE ONE (1) CAPSULE BY MOUTH EVERY DAY, Disp: 30 capsule, Rfl: 12  •  docusate sodium (COLACE) 100 MG capsule, Daily., Disp: , Rfl:   •  DULoxetine (CYMBALTA) 60 MG capsule, , Disp: , Rfl:   •  fenofibrate 160 MG tablet, Take 1 tablet by mouth Daily., Disp: , Rfl:   •  ferrous sulfate 324 (65 Fe) MG tablet delayed-release EC tablet, Take 1 tablet by mouth Daily With Breakfast. HOLD FOR SURGERY, Disp: , Rfl:   •  finasteride (PROSCAR) 5 MG tablet, TAKE ONE (1) TABLET BY MOUTH EVERY DAY, Disp: , Rfl:   •  glucose blood (Accu-Chek Guide) test strip, Test BS three times daily.  DX E11.65, Disp: 100 each, Rfl: 5  •  glucose blood test strip, TEST BEFORE MEALS AND AT BEDTIME, Disp: 100 each, Rfl: 0  •  HYDROcodone-acetaminophen (NORCO)  MG per tablet, TAKE ONE (1) TABLET BY MOUTH EVERY 4 TO 6 HOURS, Disp: , Rfl:   •  insulin aspart (NovoLOG FlexPen) 100 UNIT/ML solution pen-injector sc pen, Inject 7 Units under the skin into the appropriate area as directed 3 (Three) Times a Day Before Meals., Disp: 15 mL, Rfl: 3  •  insulin degludec (Tresiba FlexTouch) 100  UNIT/ML solution pen-injector injection, Inject 20 Units under the skin into the appropriate area as directed Every Night., Disp: 15 mL, Rfl: 3  •  Insulin Pen Needle (Easy Touch Pen Needles) 31G X 6 MM misc, 1 each by Other route 4 (Four) Times a Day., Disp: 400 each, Rfl: 3  •  losartan-hydrochlorothiazide (HYZAAR) 100-25 MG per tablet, Take 1 tablet by mouth Daily., Disp: , Rfl:   •  Lumigan 0.01 % ophthalmic drops, , Disp: , Rfl:   •  nebivolol (BYSTOLIC) 10 MG tablet, Take 1 tablet by mouth Every Morning., Disp: , Rfl:   •  omeprazole (priLOSEC) 40 MG capsule, Take 1 capsule by mouth Every Night., Disp: , Rfl:   •  Ozempic, 1 MG/DOSE, 4 MG/3ML solution pen-injector, INJECT 1 MG ONCE WEEKLY, Disp: , Rfl:   •  pregabalin (LYRICA) 300 MG capsule, TAKE ONE (1) CAPSULE BY MOUTH TWICE DAILY, Disp: , Rfl:   •  Semaglutide, 1 MG/DOSE, (OZEMPIC) 2 MG/1.5ML solution pen-injector, Inject 1 mg under the skin into the appropriate area as directed 1 (One) Time Per Week., Disp: 1.5 mL, Rfl: 6  •  simvastatin (ZOCOR) 20 MG tablet, Daily., Disp: , Rfl:   •  sodium hypochlorite in sterile water irrigation topical solution 0.0625%, Apply 946 mL topically to the appropriate area as directed As Needed (with veraflo vac therapy)., Disp: , Rfl:   •  tamsulosin (FLOMAX) 0.4 MG capsule 24 hr capsule, TAKE ONE (1) CAPSULE BY MOUTH EVERY DAY, Disp: , Rfl:   •  tiZANidine (ZANAFLEX) 4 MG tablet, Take 1 tablet by mouth Every Night., Disp: , Rfl:   •  traZODone (DESYREL) 100 MG tablet, Take 1 tablet by mouth Every Night. 2 tablet at night, Disp: , Rfl:       Assessment & Plan   1.  Diabetes mellitus type 2 with hyperglycemia: Uncontrolled but improving, will continue current regimen including Tresiba with Ozempic and glipizide.  Recommend to monitor blood sugars at home and bring the records for review and always keep glucose source in case of low blood sugars and continue to work on diet and activity.      2.  Hypertension:  Well-controlled, continue current medication    3.  Hyperlipidemia: Currently on atorvastatin fenofibrate, will follow lipid panel    4.  CKD stage III disease: Follows with nephrology    5.  Diabetic peripheral neuropathy with a bunion on the left foot.  Recently had surgery on the left foot.       Assessment plan from August 4, 2022 reviewed and updated.          Nati Scruggs MD FACE.

## 2023-05-30 NOTE — PATIENT INSTRUCTIONS
Labs before follow-up  Continue current management  Always keep glucose source in case of low blood sugar  Annual eye exam

## 2023-06-14 RX ORDER — BLOOD SUGAR DIAGNOSTIC
STRIP MISCELLANEOUS
Qty: 100 EACH | Refills: 11 | Status: SHIPPED | OUTPATIENT
Start: 2023-06-14

## 2023-08-14 RX ORDER — TRIAMCINOLONE ACETONIDE OINTMENT USP, 0.05% 0.5 MG/G
OINTMENT TOPICAL 2 TIMES DAILY
COMMUNITY
Start: 2023-06-19

## 2023-08-15 ENCOUNTER — ANESTHESIA (OUTPATIENT)
Dept: PERIOP | Facility: HOSPITAL | Age: 73
End: 2023-08-15
Payer: MEDICARE

## 2023-08-15 ENCOUNTER — ANESTHESIA EVENT (OUTPATIENT)
Dept: PERIOP | Facility: HOSPITAL | Age: 73
End: 2023-08-15
Payer: MEDICARE

## 2023-08-15 ENCOUNTER — HOSPITAL ENCOUNTER (OUTPATIENT)
Facility: HOSPITAL | Age: 73
Setting detail: HOSPITAL OUTPATIENT SURGERY
Discharge: HOME | End: 2023-08-15
Attending: SURGERY | Admitting: SURGERY
Payer: MEDICARE

## 2023-08-15 VITALS
WEIGHT: 228.3 LBS | SYSTOLIC BLOOD PRESSURE: 152 MMHG | HEART RATE: 59 BPM | RESPIRATION RATE: 16 BRPM | BODY MASS INDEX: 28.39 KG/M2 | OXYGEN SATURATION: 98 % | TEMPERATURE: 98.7 F | DIASTOLIC BLOOD PRESSURE: 60 MMHG | HEIGHT: 75 IN

## 2023-08-15 DIAGNOSIS — S91.302A WOUND OF LEFT FOOT: ICD-10-CM

## 2023-08-15 PROBLEM — L91.0 HYPERTROPHIC SCAR: Status: ACTIVE | Noted: 2023-08-15

## 2023-08-15 PROBLEM — L98.9 FOOT LESION: Status: ACTIVE | Noted: 2023-08-15

## 2023-08-15 LAB
ANION GAP SERPL CALCULATED.3IONS-SCNC: 9 MMOL/L (ref 5–15)
BUN SERPL-MCNC: 49 MG/DL (ref 8–23)
BUN/CREAT SERPL: 18.7 (ref 7–25)
CALCIUM SPEC-SCNC: 9.6 MG/DL (ref 8.6–10.5)
CHLORIDE SERPL-SCNC: 96 MMOL/L (ref 98–107)
CO2 SERPL-SCNC: 26 MMOL/L (ref 22–29)
CREAT SERPL-MCNC: 2.62 MG/DL (ref 0.76–1.27)
DEPRECATED RDW RBC AUTO: 43.1 FL (ref 37–54)
EGFRCR SERPLBLD CKD-EPI 2021: 24.9 ML/MIN/1.73
ERYTHROCYTE [DISTWIDTH] IN BLOOD BY AUTOMATED COUNT: 14.1 % (ref 12.3–15.4)
GLUCOSE BLDC GLUCOMTR-MCNC: 273 MG/DL (ref 70–130)
GLUCOSE BLDC GLUCOMTR-MCNC: 310 MG/DL (ref 70–130)
GLUCOSE SERPL-MCNC: 303 MG/DL (ref 65–99)
HCT VFR BLD AUTO: 35.1 % (ref 37.5–51)
HGB BLD-MCNC: 11.6 G/DL (ref 13–17.7)
MCH RBC QN AUTO: 27.8 PG (ref 26.6–33)
MCHC RBC AUTO-ENTMCNC: 33 G/DL (ref 31.5–35.7)
MCV RBC AUTO: 84.2 FL (ref 79–97)
PLATELET # BLD AUTO: 225 10*3/MM3 (ref 140–450)
PMV BLD AUTO: 12.9 FL (ref 6–12)
POTASSIUM SERPL-SCNC: 5.3 MMOL/L (ref 3.5–5.2)
QT INTERVAL: 379 MS
RBC # BLD AUTO: 4.17 10*6/MM3 (ref 4.14–5.8)
SODIUM SERPL-SCNC: 131 MMOL/L (ref 136–145)
WBC NRBC COR # BLD: 9.92 10*3/MM3 (ref 3.4–10.8)

## 2023-08-15 PROCEDURE — 80048 BASIC METABOLIC PNL TOTAL CA: CPT | Performed by: SURGERY

## 2023-08-15 PROCEDURE — 25010000002 CEFAZOLIN IN DEXTROSE 2-4 GM/100ML-% SOLUTION: Performed by: SURGERY

## 2023-08-15 PROCEDURE — 88305 TISSUE EXAM BY PATHOLOGIST: CPT | Performed by: SURGERY

## 2023-08-15 PROCEDURE — 25010000002 FENTANYL CITRATE (PF) 50 MCG/ML SOLUTION: Performed by: ANESTHESIOLOGY

## 2023-08-15 PROCEDURE — 25010000002 ROPIVACAINE PER 1 MG: Performed by: ANESTHESIOLOGY

## 2023-08-15 PROCEDURE — 93005 ELECTROCARDIOGRAM TRACING: CPT | Performed by: SURGERY

## 2023-08-15 PROCEDURE — 0 MEPIVACAINE HCL (PF) 1.5 % SOLUTION: Performed by: ANESTHESIOLOGY

## 2023-08-15 PROCEDURE — 25010000002 MIDAZOLAM PER 1 MG: Performed by: ANESTHESIOLOGY

## 2023-08-15 PROCEDURE — 63710000001 INSULIN REGULAR HUMAN PER 5 UNITS: Performed by: ANESTHESIOLOGY

## 2023-08-15 PROCEDURE — 93010 ELECTROCARDIOGRAM REPORT: CPT | Performed by: INTERNAL MEDICINE

## 2023-08-15 PROCEDURE — 88312 SPECIAL STAINS GROUP 1: CPT | Performed by: SURGERY

## 2023-08-15 PROCEDURE — 25010000002 PROPOFOL 10 MG/ML EMULSION: Performed by: NURSE ANESTHETIST, CERTIFIED REGISTERED

## 2023-08-15 PROCEDURE — 85027 COMPLETE CBC AUTOMATED: CPT | Performed by: SURGERY

## 2023-08-15 PROCEDURE — 82948 REAGENT STRIP/BLOOD GLUCOSE: CPT

## 2023-08-15 RX ORDER — DROPERIDOL 2.5 MG/ML
0.62 INJECTION, SOLUTION INTRAMUSCULAR; INTRAVENOUS
Status: DISCONTINUED | OUTPATIENT
Start: 2023-08-15 | End: 2023-08-15 | Stop reason: HOSPADM

## 2023-08-15 RX ORDER — LIDOCAINE HYDROCHLORIDE 20 MG/ML
INJECTION, SOLUTION INFILTRATION; PERINEURAL AS NEEDED
Status: DISCONTINUED | OUTPATIENT
Start: 2023-08-15 | End: 2023-08-15 | Stop reason: SURG

## 2023-08-15 RX ORDER — SODIUM CHLORIDE, SODIUM LACTATE, POTASSIUM CHLORIDE, CALCIUM CHLORIDE 600; 310; 30; 20 MG/100ML; MG/100ML; MG/100ML; MG/100ML
9 INJECTION, SOLUTION INTRAVENOUS CONTINUOUS
Status: DISCONTINUED | OUTPATIENT
Start: 2023-08-15 | End: 2023-08-15 | Stop reason: HOSPADM

## 2023-08-15 RX ORDER — FENTANYL CITRATE 50 UG/ML
50 INJECTION, SOLUTION INTRAMUSCULAR; INTRAVENOUS ONCE AS NEEDED
Status: COMPLETED | OUTPATIENT
Start: 2023-08-15 | End: 2023-08-15

## 2023-08-15 RX ORDER — ROPIVACAINE HYDROCHLORIDE 5 MG/ML
INJECTION, SOLUTION EPIDURAL; INFILTRATION; PERINEURAL
Status: COMPLETED | OUTPATIENT
Start: 2023-08-15 | End: 2023-08-15

## 2023-08-15 RX ORDER — HYDRALAZINE HYDROCHLORIDE 20 MG/ML
5 INJECTION INTRAMUSCULAR; INTRAVENOUS
Status: DISCONTINUED | OUTPATIENT
Start: 2023-08-15 | End: 2023-08-15 | Stop reason: HOSPADM

## 2023-08-15 RX ORDER — NALOXONE HCL 0.4 MG/ML
0.2 VIAL (ML) INJECTION AS NEEDED
Status: DISCONTINUED | OUTPATIENT
Start: 2023-08-15 | End: 2023-08-15 | Stop reason: HOSPADM

## 2023-08-15 RX ORDER — CEFAZOLIN SODIUM 2 G/100ML
2000 INJECTION, SOLUTION INTRAVENOUS ONCE
Status: COMPLETED | OUTPATIENT
Start: 2023-08-15 | End: 2023-08-15

## 2023-08-15 RX ORDER — ONDANSETRON 2 MG/ML
4 INJECTION INTRAMUSCULAR; INTRAVENOUS ONCE AS NEEDED
Status: DISCONTINUED | OUTPATIENT
Start: 2023-08-15 | End: 2023-08-15 | Stop reason: HOSPADM

## 2023-08-15 RX ORDER — PROPOFOL 10 MG/ML
VIAL (ML) INTRAVENOUS CONTINUOUS PRN
Status: DISCONTINUED | OUTPATIENT
Start: 2023-08-15 | End: 2023-08-15 | Stop reason: SURG

## 2023-08-15 RX ORDER — SODIUM CHLORIDE 0.9 % (FLUSH) 0.9 %
3-10 SYRINGE (ML) INJECTION AS NEEDED
Status: DISCONTINUED | OUTPATIENT
Start: 2023-08-15 | End: 2023-08-15 | Stop reason: HOSPADM

## 2023-08-15 RX ORDER — EPHEDRINE SULFATE 50 MG/ML
5 INJECTION, SOLUTION INTRAVENOUS ONCE AS NEEDED
Status: DISCONTINUED | OUTPATIENT
Start: 2023-08-15 | End: 2023-08-15 | Stop reason: HOSPADM

## 2023-08-15 RX ORDER — FENTANYL CITRATE 50 UG/ML
25 INJECTION, SOLUTION INTRAMUSCULAR; INTRAVENOUS
Status: DISCONTINUED | OUTPATIENT
Start: 2023-08-15 | End: 2023-08-15 | Stop reason: HOSPADM

## 2023-08-15 RX ORDER — LABETALOL HYDROCHLORIDE 5 MG/ML
5 INJECTION, SOLUTION INTRAVENOUS
Status: DISCONTINUED | OUTPATIENT
Start: 2023-08-15 | End: 2023-08-15 | Stop reason: HOSPADM

## 2023-08-15 RX ORDER — MIDAZOLAM HYDROCHLORIDE 1 MG/ML
0.5 INJECTION INTRAMUSCULAR; INTRAVENOUS
Status: DISCONTINUED | OUTPATIENT
Start: 2023-08-15 | End: 2023-08-15 | Stop reason: HOSPADM

## 2023-08-15 RX ORDER — SODIUM CHLORIDE 0.9 % (FLUSH) 0.9 %
3 SYRINGE (ML) INJECTION EVERY 12 HOURS SCHEDULED
Status: DISCONTINUED | OUTPATIENT
Start: 2023-08-15 | End: 2023-08-15 | Stop reason: HOSPADM

## 2023-08-15 RX ORDER — DIPHENHYDRAMINE HYDROCHLORIDE 50 MG/ML
12.5 INJECTION INTRAMUSCULAR; INTRAVENOUS
Status: DISCONTINUED | OUTPATIENT
Start: 2023-08-15 | End: 2023-08-15 | Stop reason: HOSPADM

## 2023-08-15 RX ORDER — HYDROCODONE BITARTRATE AND ACETAMINOPHEN 5; 325 MG/1; MG/1
1 TABLET ORAL EVERY 4 HOURS PRN
Qty: 20 TABLET | Refills: 0 | Status: SHIPPED | OUTPATIENT
Start: 2023-08-15

## 2023-08-15 RX ORDER — FLUMAZENIL 0.1 MG/ML
0.2 INJECTION INTRAVENOUS AS NEEDED
Status: DISCONTINUED | OUTPATIENT
Start: 2023-08-15 | End: 2023-08-15 | Stop reason: HOSPADM

## 2023-08-15 RX ORDER — FAMOTIDINE 10 MG/ML
20 INJECTION, SOLUTION INTRAVENOUS ONCE
Status: COMPLETED | OUTPATIENT
Start: 2023-08-15 | End: 2023-08-15

## 2023-08-15 RX ORDER — IPRATROPIUM BROMIDE AND ALBUTEROL SULFATE 2.5; .5 MG/3ML; MG/3ML
3 SOLUTION RESPIRATORY (INHALATION) ONCE AS NEEDED
Status: DISCONTINUED | OUTPATIENT
Start: 2023-08-15 | End: 2023-08-15 | Stop reason: HOSPADM

## 2023-08-15 RX ADMIN — FAMOTIDINE 20 MG: 10 INJECTION INTRAVENOUS at 08:22

## 2023-08-15 RX ADMIN — PROPOFOL 120 MCG/KG/MIN: 10 INJECTION, EMULSION INTRAVENOUS at 09:24

## 2023-08-15 RX ADMIN — ROPIVACAINE HYDROCHLORIDE 20 ML: 5 INJECTION EPIDURAL; INFILTRATION; PERINEURAL at 08:25

## 2023-08-15 RX ADMIN — LIDOCAINE HYDROCHLORIDE 100 MG: 20 INJECTION, SOLUTION INFILTRATION; PERINEURAL at 09:25

## 2023-08-15 RX ADMIN — MIDAZOLAM 0.5 MG: 1 INJECTION INTRAMUSCULAR; INTRAVENOUS at 08:31

## 2023-08-15 RX ADMIN — INSULIN HUMAN 7 UNITS: 100 INJECTION, SOLUTION PARENTERAL at 08:42

## 2023-08-15 RX ADMIN — SODIUM CHLORIDE, POTASSIUM CHLORIDE, SODIUM LACTATE AND CALCIUM CHLORIDE 9 ML/HR: 600; 310; 30; 20 INJECTION, SOLUTION INTRAVENOUS at 08:00

## 2023-08-15 RX ADMIN — MEPIVACAINE HYDROCHLORIDE 5 ML: 15 INJECTION, SOLUTION EPIDURAL; INFILTRATION at 08:25

## 2023-08-15 RX ADMIN — FENTANYL CITRATE 50 MCG: 50 INJECTION, SOLUTION INTRAMUSCULAR; INTRAVENOUS at 08:31

## 2023-08-15 RX ADMIN — CEFAZOLIN SODIUM 2000 MG: 2 INJECTION, SOLUTION INTRAVENOUS at 09:13

## 2023-08-15 NOTE — OP NOTE
Operative Note  Date of Admission:  8/15/2023  OR Date: 8/15/2023    Pre-op Diagnosis:   Left foot hypertrophic lesion    Post-Op Diagnosis Codes:  Same    Procedure:   1) resection of left foot hypertrophic lesion with sharp excisional debridement skin and subcutaneous tissue, 2 x 3 x 1 cm    Surgeon: Ruel Oh MD    Assistant:  Fátima MCCLELLAN CSA and they provided critical assistance during the case including suctioning, exposure, retraction, and reduction of blood loss.    Anesthesia: Monitored Anesthesia Care with Regional    Staff:   Circulator: Rosi Man RN  Scrub Person: Alonso Guillen  Assistant: Fátima Tellez CSA    Estimated Blood Loss: minimal    Specimens:   Order Name Source Comment Collection Info Order Time   CBC (NO DIFF)   Collected By: Poly Hester RN 8/15/2023  7:29 AM     Release to patient   Routine Release        BASIC METABOLIC PANEL   Collected By: Poly Hester RN 8/15/2023  7:29 AM     Release to patient   Routine Release        TISSUE PATHOLOGY EXAM Foot, Left  Collected By: Baltazar Oh MD 8/15/2023  9:40 AM     Release to patient   Routine Release             Complications: None    Findings: See dictation    Indications:    The patient is an 73 y.o. male seen for evaluation hypertrophic lesion at the base of the first and second toes with growth that is preventing walking.  It appears to be a keloid or hypertrophic scar but patient is in need of resection and control for his ambulation.       Procedure:    Patient was prepped and draped in a sterile manner using Bovie cutting cautery we excised the base of the mushroom like hypertrophic scar between the first and second toe base.  The wound was mobilized after prepping and the area was dissected free from the soft tissue underneath.  No bone was exposed.  Callus was then resected sharply from the skin.  Cautery was used for hemostasis.  Total segment of the wound was 2 x 3 x 1 cm with the  lesion sent for pathology.          Active Hospital Problems    Diagnosis  POA    Foot lesion [L98.9]  Unknown    Hypertrophic scar [L91.0]  Unknown      Resolved Hospital Problems   No resolved problems to display.      Baltazar Oh MD     Date: 8/15/2023  Time: 15:35 EDT

## 2023-08-15 NOTE — BRIEF OP NOTE
INCISION AND DRAINAGE LOWER EXTREMITY  Progress Note    Tomy Manjarrez  8/15/2023    Pre-op Diagnosis:   Hypertrophic left foot lesion       Post-Op Diagnosis Codes:   same    Procedure/CPTr Codes:        Procedure(s):  LEFT FOOT debridement of hypertrophic lesion              Surgeon(s):  Baltazar Oh MD    Anesthesia: Monitored Anesthesia Care with [facility]    Staff:   Circulator: Rosi Man RN  Scrub Person: Alonso Guillen  Assistant: Fátima Tellez CSA  Assistant: Fátima Tellez CSA      Estimated Blood Loss: minimal    Urine Voided: * No values recorded between 8/15/2023  9:18 AM and 8/15/2023  9:52 AM *    Specimens:                Specimens       ID Source Type Tests Collected By Collected At Frozen?    A Foot, Left Tissue TISSUE PATHOLOGY EXAM   Baltazar Oh MD 8/15/23 0938 No    Description: HYPERTROPHIC LESION FROM LEFT FOOT DORSUM    This specimen was not marked as sent.                  Drains:   [REMOVED] Urethral Catheter Straight-tip (Removed)       Findings: See dictation        Complications: None    Assistant: Fátima Tellez CSA  was responsible for performing the following activities: Retraction, Suction, Irrigation, and Placing Dressing and their skilled assistance was necessary for the success of this case.    Baltazar Oh MD     Date: 8/15/2023  Time: 10:04 EDT         - - -

## 2023-08-15 NOTE — ANESTHESIA PROCEDURE NOTES
Peripheral Block      Patient reassessed immediately prior to procedure    Patient location during procedure: pre-op  Start time: 8/15/2023 8:25 AM  Stop time: 8/15/2023 8:36 AM  Reason for block: procedure for pain, at surgeon's request and post-op pain management  Performed by  Anesthesiologist: Raymond Rosario MD  Preanesthetic Checklist  Completed: patient identified, IV checked, site marked, risks and benefits discussed, surgical consent, monitors and equipment checked, pre-op evaluation and timeout performed  Prep:  Pt Position: supine  Sterile barriers:cap, gloves, sterile barriers, washed/disinfected hands and mask  Prep: ChloraPrep  Patient monitoring: blood pressure monitoring, continuous pulse oximetry and EKG  Procedure    Sedation: yes  Performed under: local infiltration  Guidance:ultrasound guided  Images:still images obtained, printed/placed on chart    Laterality:left  Block Type:adductor canal block  Injection Technique:single-shot  Needle Type:echogenic  Resistance on Injection: none    Medications Used: ropivacaine (NAROPIN) 0.5 % injection - Injection   20 mL - 8/15/2023 8:25:00 AM  Mepivacaine HCl (PF) (CARBOCAINE) 1.5 % injection - Injection   5 mL - 8/15/2023 8:25:00 AM      Post Assessment  Injection Assessment: negative aspiration for heme, no paresthesia on injection and incremental injection  Patient Tolerance:comfortable throughout block  Complications:no  Additional Notes  USG used to verify needle placement and medication administration

## 2023-08-15 NOTE — DISCHARGE INSTRUCTIONS
"    What to expect after a Nerve Block    Nerve blocks administered to block pain affect many types of nerves, including those nerves that control movement, pain, and normal sensation. Following a nerve block, you may notice some bruising at the site where the block was given. You may experience sensations such as: numbness of the affected area or limb, tingling, heaviness (that is the limb feels heavy to you), weakness or inability to move the affected arm or leg, or a feeling as if your arm or leg has "fallen asleep."     A nerve block can last from 2 to 36 hours depending on the medications used.  Usually the weakness wears off first followed by the tingling and heaviness. As the block wears off, you may begin to notice pain; however, this sequence of events may occur in any order. Typically, you will be able to move your limb before you will feel it. Once a nerve block begins to wear off, the effects are usually completely gone within 60 minutes.  If you experience continued side effects that you believe are block related for longer than 48 hours, please call your healthcare provider. Please see block-specific instructions below.    Instructions for any block involving the shoulder or arm  If you have had any kind of shoulder/arm block, you will go home with your arm in a sling. Wear the sling until the block has completely worn off. You may be required to wear it for a longer period of time per your surgeon's recommendations.  If you have had a shoulder/arm block, it is a good idea to sleep on a recliner with pillows under your arm.    You may experience symptoms such as:  Shortness of breath  Hoarseness   Blurry vision  Unequal pupils  Drooping of your face on the same side as the block was performed    These are side effects associated with this kind of block and should go away within 12 hours.    Note: If you have severe or prolonged shortness of breath, please seek medical assistance as soon as possible. " "    Protection of a "blocked" arm or leg (limb)  After a nerve block, you cannot feel pain, pressure, or extremes of temperature in the affected limb. And because of this, your blocked limb is at more risk for injury. For example, it is possible to burn your limb on an extremely hot surface without feeling it.     When resting, it is important to reposition your limb periodically to avoid prolonged pressure on it. This may require the use of pillows and padding.    While sleeping, you should avoid rolling onto the affected limb or putting too much pressure on it.     If you have a cast or tight dressing, check the color of your fingers or toes of the affected limb. Call your surgeon if they look discolored (that is, dusky, dark colored).    Use caution in cold weather. Cover your limb appropriately to protect it from the cold.      Pain Management:    Your surgeon will give you a prescription for pain medication. Begin taking this before the nerve block wears off. Bear in mind that sometimes the block can wear off in the middle of the night.   "

## 2023-08-15 NOTE — ANESTHESIA POSTPROCEDURE EVALUATION
"Patient: Tomy Manjarrez    Procedure Summary       Date: 08/15/23 Room / Location: [facility] OR  / [facility] MAIN OR    Anesthesia Start: 0918 Anesthesia Stop: 0948    Procedure: LEFT FOOT INCISION AND DRAINAGE (Left) Diagnosis:     Surgeons: Baltazar Oh MD Provider: Raymond Rosario MD    Anesthesia Type: general ASA Status: 3            Anesthesia Type: general    Vitals  Vitals Value Taken Time   /60 08/15/23 1048   Temp     Pulse 60 08/15/23 1059   Resp 16 08/15/23 1030   SpO2 98 % 08/15/23 1059   Vitals shown include unvalidated device data.        Post Anesthesia Care and Evaluation    Patient location during evaluation: PACU  Patient participation: complete - patient participated  Level of consciousness: awake and alert  Pain management: adequate    Airway patency: patent  Anesthetic complications: No anesthetic complications  PONV Status: controlled  Cardiovascular status: acceptable and hemodynamically stable  Respiratory status: acceptable  Hydration status: acceptable    Comments: /66   Pulse 58   Temp 37.1 øC (98.7 øF) (Oral)   Resp 16   Ht 190.5 cm (75\")   Wt 104 kg (228 lb 4.8 oz)   SpO2 97%   BMI 28.54 kg/mý     "

## 2023-08-15 NOTE — ANESTHESIA PREPROCEDURE EVALUATION
Anesthesia Evaluation     NPO Solid Status: > 8 hours  NPO Liquid Status: > 8 hours           Airway   Mallampati: II  No difficulty expected  Dental      Pulmonary    Cardiovascular     (+) hypertensionhyperlipidemia      Neuro/Psych  (+) numbness  GI/Hepatic/Renal/Endo    (+) GERD, renal disease CRI, diabetes mellitus    Musculoskeletal     Abdominal    Substance History      OB/GYN          Other   arthritis,   history of cancer    ROS/Med Hx Other: Diabetic neuropathy     Pt states surgery between first 2 toes -> saphenous block     Preop  -> 7 units reg insulin in preop                 Anesthesia Plan    ASA 3     general     ([facility] for [facility]  )  intravenous induction     Anesthetic plan, risks, benefits, and alternatives have been provided, discussed and informed consent has been obtained with: patient.    CODE STATUS:

## 2023-08-17 LAB
LAB AP CASE REPORT: NORMAL
LAB AP DIAGNOSIS COMMENT: NORMAL
PATH REPORT.FINAL DX SPEC: NORMAL
PATH REPORT.GROSS SPEC: NORMAL

## 2023-08-28 DIAGNOSIS — E11.65 TYPE 2 DIABETES MELLITUS WITH HYPERGLYCEMIA, WITH LONG-TERM CURRENT USE OF INSULIN: ICD-10-CM

## 2023-08-28 DIAGNOSIS — Z79.4 TYPE 2 DIABETES MELLITUS WITH HYPERGLYCEMIA, WITH LONG-TERM CURRENT USE OF INSULIN: ICD-10-CM

## 2023-08-29 RX ORDER — INSULIN ASPART 100 [IU]/ML
INJECTION, SOLUTION INTRAVENOUS; SUBCUTANEOUS
Qty: 15 ML | Refills: 5 | Status: SHIPPED | OUTPATIENT
Start: 2023-08-29

## 2023-08-29 NOTE — TELEPHONE ENCOUNTER
Last visit 5/2023. Next 2/2024    Per [employer] witness policy, when refills come in if there are any red check marks or if it was a paper/verbal request it has to go to the provider to approve. Forwarding Script to provider.

## 2023-09-01 ENCOUNTER — TELEPHONE (OUTPATIENT)
Dept: ENDOCRINOLOGY | Facility: CLINIC | Age: 73
End: 2023-09-01
Payer: MEDICARE

## 2023-09-01 DIAGNOSIS — E11.65 TYPE 2 DIABETES MELLITUS WITH HYPERGLYCEMIA, WITH LONG-TERM CURRENT USE OF INSULIN: ICD-10-CM

## 2023-09-01 DIAGNOSIS — Z79.4 TYPE 2 DIABETES MELLITUS WITH HYPERGLYCEMIA, WITH LONG-TERM CURRENT USE OF INSULIN: ICD-10-CM

## 2023-09-01 NOTE — TELEPHONE ENCOUNTER
Caller: Tomy Manjarrez    Relationship: Self    Best call back number: [phone]    Who are you requesting to speak with (clinical staff, provider,  specific staff member): CLINICAL TEAM     What was the call regarding: PT WOULD LIKE TO VERIFY WHICH PHARMACY RX WAS SENT TO ORIGINALLY AND THAT IT IS THE CORRECT PHARMACY NOTATED IN ENCOUNTER .     Is it okay if the provider responds through MyChart: NO

## 2023-09-01 NOTE — TELEPHONE ENCOUNTER
Caller: Tomy Manjarrez    Relationship: Self    Best call back number: [phone]    Requested Prescriptions:   NOVALOG     Pharmacy where request should be sent:  [facility] [phone]    Last office visit with prescribing clinician: 5/30/2023   Last telemedicine visit with prescribing clinician: Visit date not found   Next office visit with prescribing clinician: 2/15/2024     Additional details provided by patient: 1 PEN OF NOVALOG LEFT, PT IS UNSURE IF IT WILL LAST UNTIL MONDAY. PLEASE SEND RX TO PHARMACY.     Does the patient have less than a 3 day supply:  [x] Yes  [] No    Would you like a call back once the refill request has been completed: [x] Yes [] No    If the office needs to give you a call back, can they leave a voicemail: [x] Yes [] No    Jose Luis Syed Rep   09/01/23 09:17 EDT

## 2023-09-05 RX ORDER — INSULIN ASPART 100 [IU]/ML
INJECTION, SOLUTION INTRAVENOUS; SUBCUTANEOUS
Qty: 15 ML | Refills: 5 | Status: SHIPPED | OUTPATIENT
Start: 2023-09-05

## 2023-09-18 ENCOUNTER — HOSPITAL ENCOUNTER (OUTPATIENT)
Dept: ULTRASOUND IMAGING | Facility: HOSPITAL | Age: 73
Discharge: HOME | End: 2023-09-18
Admitting: NURSE PRACTITIONER
Payer: MEDICARE

## 2023-09-18 ENCOUNTER — TRANSCRIBE ORDERS (OUTPATIENT)
Dept: ADMINISTRATIVE | Facility: HOSPITAL | Age: 73
End: 2023-09-18
Payer: MEDICARE

## 2023-09-18 DIAGNOSIS — N50.811 RIGHT TESTICULAR PAIN: ICD-10-CM

## 2023-09-18 DIAGNOSIS — N50.811 RIGHT TESTICULAR PAIN: Primary | ICD-10-CM

## 2023-09-18 PROCEDURE — 93976 VASCULAR STUDY: CPT

## 2023-09-18 PROCEDURE — 76870 US EXAM SCROTUM: CPT

## 2024-01-19 DIAGNOSIS — Z79.4 TYPE 2 DIABETES MELLITUS WITH HYPERGLYCEMIA, WITH LONG-TERM CURRENT USE OF INSULIN: ICD-10-CM

## 2024-01-19 DIAGNOSIS — E11.65 TYPE 2 DIABETES MELLITUS WITH HYPERGLYCEMIA, WITH LONG-TERM CURRENT USE OF INSULIN: ICD-10-CM

## 2024-01-19 RX ORDER — SEMAGLUTIDE 1.34 MG/ML
INJECTION, SOLUTION SUBCUTANEOUS
Qty: 3 ML | Refills: 6 | Status: SHIPPED | OUTPATIENT
Start: 2024-01-19

## 2024-01-22 RX ORDER — ACETAMINOPHEN 160 MG
TABLET,DISINTEGRATING ORAL
Qty: 30 CAPSULE | Refills: 12 | Status: SHIPPED | OUTPATIENT
Start: 2024-01-22

## 2024-03-07 DIAGNOSIS — I70.212 ATHEROSCLEROSIS OF NATIVE ARTERY OF LEFT LOWER EXTREMITY WITH INTERMITTENT CLAUDICATION: ICD-10-CM

## 2024-03-07 DIAGNOSIS — Z95.828 PERSONAL HISTORY OF EXTREMITY BYPASS GRAFT: ICD-10-CM

## 2024-03-07 DIAGNOSIS — I70.234 ATHSCL NATIVE ART OF RIGHT LEG W ULCER OF HEEL AND MIDFOOT: Primary | ICD-10-CM

## 2024-04-03 PROBLEM — I73.9 PERIPHERAL VASCULAR DISEASE: Status: ACTIVE | Noted: 2024-04-03

## 2024-04-18 ENCOUNTER — HOSPITAL ENCOUNTER (OUTPATIENT)
Facility: HOSPITAL | Age: 74
Discharge: HOME | End: 2024-04-18
Payer: MEDICARE

## 2024-04-18 ENCOUNTER — OFFICE VISIT (OUTPATIENT)
Age: 74
End: 2024-04-18
Payer: MEDICARE

## 2024-04-18 VITALS
DIASTOLIC BLOOD PRESSURE: 65 MMHG | BODY MASS INDEX: 30.46 KG/M2 | HEIGHT: 75 IN | WEIGHT: 245 LBS | SYSTOLIC BLOOD PRESSURE: 129 MMHG | HEART RATE: 60 BPM

## 2024-04-18 DIAGNOSIS — I73.9 PERIPHERAL ARTERIAL DISEASE WITH HISTORY OF REVASCULARIZATION: ICD-10-CM

## 2024-04-18 DIAGNOSIS — Z95.828 PERSONAL HISTORY OF EXTREMITY BYPASS GRAFT: ICD-10-CM

## 2024-04-18 DIAGNOSIS — L97.422 DIABETIC ULCER OF LEFT MIDFOOT ASSOCIATED WITH DIABETES MELLITUS DUE TO UNDERLYING CONDITION, WITH FAT LAYER EXPOSED: ICD-10-CM

## 2024-04-18 DIAGNOSIS — E08.621 DIABETIC ULCER OF LEFT MIDFOOT ASSOCIATED WITH DIABETES MELLITUS DUE TO UNDERLYING CONDITION, WITH FAT LAYER EXPOSED: ICD-10-CM

## 2024-04-18 DIAGNOSIS — Z98.890 PERIPHERAL ARTERIAL DISEASE WITH HISTORY OF REVASCULARIZATION: ICD-10-CM

## 2024-04-18 DIAGNOSIS — I70.212 ATHEROSCLEROSIS OF NATIVE ARTERY OF LEFT LOWER EXTREMITY WITH INTERMITTENT CLAUDICATION: ICD-10-CM

## 2024-04-18 DIAGNOSIS — I70.234 ATHSCL NATIVE ART OF RIGHT LEG W ULCER OF HEEL AND MIDFOOT: ICD-10-CM

## 2024-04-18 DIAGNOSIS — E11.42 DIABETIC PERIPHERAL NEUROPATHY ASSOCIATED WITH TYPE 2 DIABETES MELLITUS: Primary | ICD-10-CM

## 2024-04-18 PROBLEM — L97.509 DIABETIC FOOT ULCER ASSOCIATED WITH DIABETES MELLITUS DUE TO UNDERLYING CONDITION: Status: ACTIVE | Noted: 2024-04-18

## 2024-04-18 LAB
BH CV LOWER ARTERIAL LEFT ABI RATIO: 1.02
BH CV LOWER ARTERIAL LEFT DORSALIS PEDIS SYS MAX: 178
BH CV LOWER ARTERIAL LEFT POST TIBIAL SYS MAX: 178
BH CV LOWER ARTERIAL RIGHT ABI RATIO: 1.07
BH CV LOWER ARTERIAL RIGHT DORSALIS PEDIS SYS MAX: 180
BH CV LOWER ARTERIAL RIGHT POST TIBIAL SYS MAX: 186
UPPER ARTERIAL LEFT ARM BRACHIAL SYS MAX: NORMAL
UPPER ARTERIAL RIGHT ARM BRACHIAL SYS MAX: NORMAL

## 2024-04-18 PROCEDURE — 93926 LOWER EXTREMITY STUDY: CPT

## 2024-04-18 PROCEDURE — 93922 UPR/L XTREMITY ART 2 LEVELS: CPT

## 2024-04-18 RX ORDER — INSULIN DEGLUDEC 200 U/ML
INJECTION, SOLUTION SUBCUTANEOUS
COMMUNITY
Start: 2024-02-23

## 2024-04-18 NOTE — PROGRESS NOTES
Chief Complaint  Peripheral Vascular Disease and Foot wound     Subjective        Tomy Manjarrez presents to [facility] VASCULAR SURGERY  HPI   Tomy Manjarrez is a 74 y.o. male that has been followed in our office for peripheral arterial disease.   On 3/28/2023, he had a left anterior tibial and dorsalis pedis angioplasty and a left popliteal drug eluting angioplasty.  On 8/15/2023, he had a resection of the left foot hypertrophic lesion with debridement of his skin.  He returns today in follow up along with ABIs and a left lower extremity arterial duplex.  He reports he  has been doing well without hospitalizations or surgeries. He denies any worsening claudication symptoms or rest pain.  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  At the last visit, Dr. Oh sent him to [facility] for custom shoe, though he reports he is unable to afford it as it was not covered by insurance.  It is still present today.  He reports he has been using Neosporin.    Review of Systems   Constitutional:  Negative for fever.   Eyes:  Negative for visual disturbance.   Cardiovascular:  Negative for leg swelling.   Gastrointestinal:  Negative for abdominal pain.   Musculoskeletal:  Negative for back pain.   Skin:  Positive for wound. Negative for color change and pallor.   Neurological:  Negative for dizziness, facial asymmetry, speech difficulty and weakness.        Tomy Manjarrez  reports that he quit smoking about 16 years ago. His smoking use included cigarettes. He started smoking about 31 years ago. He has a 15 pack-year smoking history. He has never used smokeless tobacco..        Objective   Vital Signs:  Vitals:    04/18/24 0900   BP: 129/65   Pulse: 60      Body mass index is 30.62 kg/m².           Physical Exam  Vitals reviewed.   Constitutional:       Appearance: Normal appearance.   HENT:      Head: Normocephalic.   Cardiovascular:      Rate and Rhythm: Normal rate and regular  rhythm.      Pulses: Normal pulses.           Dorsalis pedis pulses are 3+ on the right side and 3+ on the left side.        Posterior tibial pulses are 3+ on the right side and 3+ on the left side.   Pulmonary:      Effort: Pulmonary effort is normal.   Musculoskeletal:        Feet:    Feet:      Comments: Left mal perforans ulceration.  No evidence of infection.  Skin:     General: Skin is warm.   Neurological:      General: No focal deficit present.      Mental Status: He is alert and oriented to person, place, and time.   Psychiatric:         Mood and Affect: Mood normal.          Result Review :    ABIs from last year: Greater than 1 on the right. cannot be obtained due to calcification on the left.    ABIs from today: Greater than 1 on the right with monophasic waveforms which is likely elevated.  On the left, he has greater than 1 ALLYSSA with triphasic waveforms.  His left lower extremity arterial duplex shows patency of all vessels.                   Assessment and Plan     Diagnoses and all orders for this visit:    1. Diabetic peripheral neuropathy associated with type 2 diabetes mellitus (Primary)  -     Ambulatory Referral to Wound Clinic  -     Doppler Arterial Multi Level Lower Extremity - Bilateral CAR; Future  -     Duplex Lower Extremity Art / Grafts - Left CAR; Future    2. 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 Peripheral arterial disease with history of revascularization  Assessment & Plan:  3/28/2023: Left anterior tibial and dorsalis pedis artery angioplasty with left popliteal drug-eluting angioplasty    Orders:  -     Doppler Arterial Multi Level Lower Extremity - Bilateral CAR; Future  -     Duplex Lower Extremity Art / Grafts - Left  CAR; Future      Patient presents today for follow-up after his lower extremity intervention.  This shows his arteries are open with a blood flow of greater than 1 on the right triphasic waveforms.  He continues to have a mal perforans ulceration to his left midfoot.  We attempted to get him sent for custom orthotics use, though he reports his insurance will not cover it.  The area of ulceration has persisted and I have recommended a referral to the [facility] wound care for further evaluation and management.  That referral was sent today.  I would like to see him back in 3 months along with repeat imaging.       Follow Up     Return in about 3 months (around 7/18/2024) for Recheck, dayan.  Patient was given instructions and counseling regarding his condition or for health maintenance advice. Please see specific information pulled into the AVS if appropriate.     RADHA Gaona

## 2024-04-18 NOTE — ASSESSMENT & PLAN NOTE
3/28/2023: Left anterior tibial and dorsalis pedis artery angioplasty with left popliteal drug-eluting angioplasty

## 2024-04-19 LAB
BH CV LEA LEFT ANT TIBIAL A DISTAL EDV: 21.2 CM/S
BH CV LEA LEFT ANT TIBIAL A DISTAL PSV: 114.5 CM/S
BH CV LEA LEFT ANT TIBIAL A MID EDV: 18 CM/S
BH CV LEA LEFT ANT TIBIAL A MID PSV: 128.6 CM/S
BH CV LEA LEFT ANT TIBIAL A PROX EDV: 13 CM/S
BH CV LEA LEFT ANT TIBIAL A PROX PSV: 106.2 CM/S
BH CV LEA LEFT CFA PROX PSV: 155 CM/S
BH CV LEA LEFT DFA PROX PSV: 144 CM/S
BH CV LEA LEFT PERONEAL  DISTAL PSV: 53.1 CM/S
BH CV LEA LEFT PERONEAL  MID PSV: 101.9 CM/S
BH CV LEA LEFT PERONEAL  PROX PSV: 78.9 CM/S
BH CV LEA LEFT POPITEAL A  DISTAL EDV: -18.4 CM/S
BH CV LEA LEFT POPITEAL A  DISTAL PSV: -164.6 CM/S
BH CV LEA LEFT POPITEAL A  MID EDV: 20.9 CM/S
BH CV LEA LEFT POPITEAL A  MID PSV: 197.6 CM/S
BH CV LEA LEFT POPITEAL A  PROX EDV: 18.2 CM/S
BH CV LEA LEFT POPITEAL A  PROX PSV: 139.5 CM/S
BH CV LEA LEFT PTA DISTAL EDV: -18.2 CM/S
BH CV LEA LEFT PTA DISTAL PSV: -129.1 CM/S
BH CV LEA LEFT PTA MID EDV: 25.2 CM/S
BH CV LEA LEFT PTA MID PSV: 172.3 CM/S
BH CV LEA LEFT PTA PROX EDV: -10.6 CM/S
BH CV LEA LEFT PTA PROX PSV: -103.7 CM/S
BH CV LEA LEFT SFA DISTAL EDV: -22 CM/S
BH CV LEA LEFT SFA DISTAL PSV: -220.9 CM/S
BH CV LEA LEFT SFA MID EDV: -16.5 CM/S
BH CV LEA LEFT SFA MID PSV: -156.9 CM/S
BH CV LEA LEFT SFA PROX EDV: 19.2 CM/S
BH CV LEA LEFT SFA PROX PSV: 236 CM/S
LEFT GROIN CFA SYS: 154.9 CM/SEC

## 2024-05-13 ENCOUNTER — LAB (OUTPATIENT)
Dept: LAB | Facility: HOSPITAL | Age: 74
End: 2024-05-13
Payer: MEDICARE

## 2024-05-13 ENCOUNTER — TRANSCRIBE ORDERS (OUTPATIENT)
Dept: LAB | Facility: HOSPITAL | Age: 74
End: 2024-05-13
Payer: MEDICARE

## 2024-05-13 ENCOUNTER — HOSPITAL ENCOUNTER (OUTPATIENT)
Dept: GENERAL RADIOLOGY | Facility: HOSPITAL | Age: 74
Discharge: HOME | End: 2024-05-13
Payer: MEDICARE

## 2024-05-13 ENCOUNTER — LAB REQUISITION (OUTPATIENT)
Dept: LAB | Facility: HOSPITAL | Age: 74
End: 2024-05-13
Payer: MEDICARE

## 2024-05-13 ENCOUNTER — OFFICE VISIT (OUTPATIENT)
Dept: WOUND CARE | Facility: HOSPITAL | Age: 74
End: 2024-05-13
Payer: MEDICARE

## 2024-05-13 DIAGNOSIS — E11.65 TYPE 2 DIABETES MELLITUS WITH HYPERGLYCEMIA, WITH LONG-TERM CURRENT USE OF INSULIN: ICD-10-CM

## 2024-05-13 DIAGNOSIS — E11.69 DIABETIC FOOT ULCER WITH OSTEOMYELITIS: Primary | ICD-10-CM

## 2024-05-13 DIAGNOSIS — L97.522 ULCER OF LEFT FOOT, WITH FAT LAYER EXPOSED: ICD-10-CM

## 2024-05-13 DIAGNOSIS — E11.621 DIABETIC FOOT ULCER WITH OSTEOMYELITIS: ICD-10-CM

## 2024-05-13 DIAGNOSIS — E11.69 DIABETIC FOOT ULCER WITH OSTEOMYELITIS: ICD-10-CM

## 2024-05-13 DIAGNOSIS — L97.509 DIABETIC FOOT ULCER WITH OSTEOMYELITIS: ICD-10-CM

## 2024-05-13 DIAGNOSIS — E11.621 DIABETIC FOOT ULCER WITH OSTEOMYELITIS: Primary | ICD-10-CM

## 2024-05-13 DIAGNOSIS — E11.621 TYPE 2 DIABETES MELLITUS WITH FOOT ULCER (CODE): ICD-10-CM

## 2024-05-13 DIAGNOSIS — M86.9 DIABETIC FOOT ULCER WITH OSTEOMYELITIS: ICD-10-CM

## 2024-05-13 DIAGNOSIS — M86.9 DIABETIC FOOT ULCER WITH OSTEOMYELITIS: Primary | ICD-10-CM

## 2024-05-13 DIAGNOSIS — L97.522 NON-PRS CHRONIC ULCER OTH PRT LEFT FOOT W FAT LAYER EXPOSED: ICD-10-CM

## 2024-05-13 DIAGNOSIS — Z79.4 TYPE 2 DIABETES MELLITUS WITH HYPERGLYCEMIA, WITH LONG-TERM CURRENT USE OF INSULIN: ICD-10-CM

## 2024-05-13 DIAGNOSIS — L97.509 DIABETIC FOOT ULCER WITH OSTEOMYELITIS: Primary | ICD-10-CM

## 2024-05-13 LAB
ANION GAP SERPL CALCULATED.3IONS-SCNC: 16.7 MMOL/L (ref 5–15)
BASOPHILS # BLD AUTO: 0.06 10*3/MM3 (ref 0–0.2)
BASOPHILS NFR BLD AUTO: 0.7 % (ref 0–1.5)
BUN SERPL-MCNC: 38 MG/DL (ref 8–23)
BUN/CREAT SERPL: 16.7 (ref 7–25)
CALCIUM SPEC-SCNC: 9.6 MG/DL (ref 8.6–10.5)
CHLORIDE SERPL-SCNC: 98 MMOL/L (ref 98–107)
CO2 SERPL-SCNC: 24.3 MMOL/L (ref 22–29)
CREAT SERPL-MCNC: 2.27 MG/DL (ref 0.76–1.27)
CRP SERPL-MCNC: 0.62 MG/DL (ref 0–0.5)
DEPRECATED RDW RBC AUTO: 44.3 FL (ref 37–54)
EGFRCR SERPLBLD CKD-EPI 2021: 29.3 ML/MIN/1.73
EOSINOPHIL # BLD AUTO: 0.27 10*3/MM3 (ref 0–0.4)
EOSINOPHIL NFR BLD AUTO: 3.4 % (ref 0.3–6.2)
ERYTHROCYTE [DISTWIDTH] IN BLOOD BY AUTOMATED COUNT: 13.7 % (ref 12.3–15.4)
ERYTHROCYTE [SEDIMENTATION RATE] IN BLOOD: 22 MM/HR (ref 0–20)
GLUCOSE SERPL-MCNC: 226 MG/DL (ref 65–99)
HBA1C MFR BLD: 8.3 % (ref 4.8–5.6)
HCT VFR BLD AUTO: 36.7 % (ref 37.5–51)
HGB BLD-MCNC: 12.3 G/DL (ref 13–17.7)
IMM GRANULOCYTES # BLD AUTO: 0.02 10*3/MM3 (ref 0–0.05)
IMM GRANULOCYTES NFR BLD AUTO: 0.2 % (ref 0–0.5)
LYMPHOCYTES # BLD AUTO: 2.23 10*3/MM3 (ref 0.7–3.1)
LYMPHOCYTES NFR BLD AUTO: 27.7 % (ref 19.6–45.3)
MCH RBC QN AUTO: 29.9 PG (ref 26.6–33)
MCHC RBC AUTO-ENTMCNC: 33.5 G/DL (ref 31.5–35.7)
MCV RBC AUTO: 89.1 FL (ref 79–97)
MONOCYTES # BLD AUTO: 0.73 10*3/MM3 (ref 0.1–0.9)
MONOCYTES NFR BLD AUTO: 9.1 % (ref 5–12)
NEUTROPHILS NFR BLD AUTO: 4.73 10*3/MM3 (ref 1.7–7)
NEUTROPHILS NFR BLD AUTO: 58.9 % (ref 42.7–76)
PLATELET # BLD AUTO: 161 10*3/MM3 (ref 140–450)
PMV BLD AUTO: 13.7 FL (ref 6–12)
POTASSIUM SERPL-SCNC: 5.2 MMOL/L (ref 3.5–5.2)
PREALB SERPL-MCNC: 18.2 MG/DL (ref 20–40)
RBC # BLD AUTO: 4.12 10*6/MM3 (ref 4.14–5.8)
SODIUM SERPL-SCNC: 139 MMOL/L (ref 136–145)
WBC NRBC COR # BLD AUTO: 8.04 10*3/MM3 (ref 3.4–10.8)

## 2024-05-13 PROCEDURE — 87070 CULTURE OTHR SPECIMN AEROBIC: CPT | Performed by: NURSE PRACTITIONER

## 2024-05-13 PROCEDURE — 87075 CULTR BACTERIA EXCEPT BLOOD: CPT | Performed by: NURSE PRACTITIONER

## 2024-05-13 PROCEDURE — 87147 CULTURE TYPE IMMUNOLOGIC: CPT | Performed by: NURSE PRACTITIONER

## 2024-05-13 PROCEDURE — 84134 ASSAY OF PREALBUMIN: CPT

## 2024-05-13 PROCEDURE — 87186 SC STD MICRODIL/AGAR DIL: CPT | Performed by: NURSE PRACTITIONER

## 2024-05-13 PROCEDURE — 85025 COMPLETE CBC W/AUTO DIFF WBC: CPT

## 2024-05-13 PROCEDURE — 80048 BASIC METABOLIC PNL TOTAL CA: CPT

## 2024-05-13 PROCEDURE — 86140 C-REACTIVE PROTEIN: CPT

## 2024-05-13 PROCEDURE — 87205 SMEAR GRAM STAIN: CPT | Performed by: NURSE PRACTITIONER

## 2024-05-13 PROCEDURE — 83036 HEMOGLOBIN GLYCOSYLATED A1C: CPT

## 2024-05-13 PROCEDURE — G0463 HOSPITAL OUTPT CLINIC VISIT: HCPCS

## 2024-05-13 PROCEDURE — 73630 X-RAY EXAM OF FOOT: CPT

## 2024-05-13 PROCEDURE — 36415 COLL VENOUS BLD VENIPUNCTURE: CPT

## 2024-05-13 PROCEDURE — 85652 RBC SED RATE AUTOMATED: CPT

## 2024-05-13 PROCEDURE — 87015 SPECIMEN INFECT AGNT CONCNTJ: CPT | Performed by: NURSE PRACTITIONER

## 2024-05-16 LAB
BACTERIA SPEC AEROBE CULT: ABNORMAL
BACTERIA SPEC AEROBE CULT: ABNORMAL
GRAM STN SPEC: ABNORMAL

## 2024-05-18 LAB — BACTERIA SPEC ANAEROBE CULT: NORMAL

## 2024-05-20 ENCOUNTER — OFFICE VISIT (OUTPATIENT)
Dept: WOUND CARE | Facility: HOSPITAL | Age: 74
End: 2024-05-20
Payer: MEDICARE

## 2024-06-20 ENCOUNTER — OFFICE VISIT (OUTPATIENT)
Dept: WOUND CARE | Facility: HOSPITAL | Age: 74
End: 2024-06-20
Payer: MEDICARE

## 2024-07-18 ENCOUNTER — OFFICE VISIT (OUTPATIENT)
Age: 74
End: 2024-07-18
Payer: MEDICARE

## 2024-07-18 ENCOUNTER — HOSPITAL ENCOUNTER (OUTPATIENT)
Facility: HOSPITAL | Age: 74
Discharge: HOME | End: 2024-07-18
Payer: MEDICARE

## 2024-07-18 ENCOUNTER — OFFICE VISIT (OUTPATIENT)
Dept: WOUND CARE | Facility: HOSPITAL | Age: 74
End: 2024-07-18
Payer: MEDICARE

## 2024-07-18 ENCOUNTER — HOSPITAL ENCOUNTER (OUTPATIENT)
Facility: HOSPITAL | Age: 74
End: 2024-07-18
Payer: MEDICARE

## 2024-07-18 VITALS
BODY MASS INDEX: 29.84 KG/M2 | SYSTOLIC BLOOD PRESSURE: 118 MMHG | WEIGHT: 240 LBS | HEIGHT: 75 IN | DIASTOLIC BLOOD PRESSURE: 50 MMHG

## 2024-07-18 DIAGNOSIS — E08.621 DIABETIC ULCER OF LEFT MIDFOOT ASSOCIATED WITH DIABETES MELLITUS DUE TO UNDERLYING CONDITION, WITH FAT LAYER EXPOSED: ICD-10-CM

## 2024-07-18 DIAGNOSIS — I73.9 PERIPHERAL ARTERIAL DISEASE WITH HISTORY OF REVASCULARIZATION: ICD-10-CM

## 2024-07-18 DIAGNOSIS — Z98.890 PERIPHERAL ARTERIAL DISEASE WITH HISTORY OF REVASCULARIZATION: ICD-10-CM

## 2024-07-18 DIAGNOSIS — E11.42 DIABETIC PERIPHERAL NEUROPATHY ASSOCIATED WITH TYPE 2 DIABETES MELLITUS: ICD-10-CM

## 2024-07-18 DIAGNOSIS — I73.9 PERIPHERAL VASCULAR DISEASE, UNSPECIFIED: ICD-10-CM

## 2024-07-18 DIAGNOSIS — I73.9 PERIPHERAL ARTERIAL DISEASE WITH HISTORY OF REVASCULARIZATION: Primary | ICD-10-CM

## 2024-07-18 DIAGNOSIS — L97.422 DIABETIC ULCER OF LEFT MIDFOOT ASSOCIATED WITH DIABETES MELLITUS DUE TO UNDERLYING CONDITION, WITH FAT LAYER EXPOSED: ICD-10-CM

## 2024-07-18 DIAGNOSIS — Z98.890 PERIPHERAL ARTERIAL DISEASE WITH HISTORY OF REVASCULARIZATION: Primary | ICD-10-CM

## 2024-07-18 PROCEDURE — 93922 UPR/L XTREMITY ART 2 LEVELS: CPT

## 2024-07-18 PROCEDURE — 93926 LOWER EXTREMITY STUDY: CPT

## 2024-07-18 NOTE — PROGRESS NOTES
Chief Complaint  Peripheral Vascular Disease    Subjective        Tomy Manjarrez presents to [facility] VASCULAR SURGERY  HPI   Tomy Manjarrez is a 74 y.o. male that has been followed in our office for peripheral arterial disease and  a malperforans ulcer to his left foot.  On 3/28/2023, he had a left anterior tibial/dorsalis pedis angioplasty and a left popliteal drug-eluting balloon angioplasty.  He returns today in follow up along with ABIs. He reports he  has been doing well without hospitalizations or surgeries. He denies any worsening claudication symptoms, rest pain, or tissue loss.  His left foot wound is almost healed.  He is actually going to the wound care care clinic after this visit and believes he may be discharged from their services.  He is compliant with foot inspection as well as his diabetic shoes.    Review of Systems   Constitutional:  Negative for fever.   Eyes:  Negative for visual disturbance.   Cardiovascular:  Negative for leg swelling.   Gastrointestinal:  Negative for abdominal pain.   Musculoskeletal:  Negative for back pain.   Skin:  Negative for color change, pallor and wound.   Neurological:  Negative for dizziness, facial asymmetry, speech difficulty and weakness.        Tomy Manjarrez  reports that he quit smoking about 16 years ago. His smoking use included cigarettes. He started smoking about 31 years ago. He has a 15 pack-year smoking history. He has never used smokeless tobacco..        Objective   Vital Signs:  Vitals:    07/18/24 1352   BP: 118/50      Body mass index is 30 kg/m².           Physical Exam  Vitals reviewed.   Constitutional:       Appearance: Normal appearance.   HENT:      Head: Normocephalic.   Cardiovascular:      Rate and Rhythm: Normal rate and regular rhythm.      Pulses: Normal pulses.           Dorsalis pedis pulses are 3+ on the right side and 3+ on the left side.        Posterior tibial pulses are 3+ on the right side and 3+ on  the left side.   Pulmonary:      Effort: Pulmonary effort is normal.   Skin:     General: Skin is warm.   Neurological:      General: No focal deficit present.      Mental Status: He is alert and oriented to person, place, and time.   Psychiatric:         Mood and Affect: Mood normal.        Result Review :    ABIs from last year: 1 on the right and falsely elevated on the left.    ABIs from today: Doppler Ankle Brachial Index Single Level CAR (07/18/2024 13:45)   Doppler Ankle Brachial Index Single Level CAR (07/18/2024 13:45)                    Assessment and Plan     Diagnoses and all orders for this visit:    1. Peripheral arterial disease with history of revascularization (Primary)  -     Doppler Ankle Brachial Index Single Level CAR; Future  -     Duplex Lower Extremity Art / Grafts - Left CAR; Future          Patient presents today for follow up of his peripheral arterial disease.  His popliteal and tibial intervention is patent.  His ABIs are noncompressible, though his wound is essentially healed.  He will continue follow-up in the wound care center and be discharged at their discretion.  He is to continue his statin for cholesterol control.  We discussed adequate blood pressure management.  We discussed continuing his walking protocol. He will return in 6 months  along with ABIs and a left lower extremity duplex.         Follow Up     Return in about 6 months (around 1/18/2025) for dayan.  Patient was given instructions and counseling regarding his condition or for health maintenance advice. Please see specific information pulled into the AVS if appropriate.     RADHA Gaona

## 2024-07-19 LAB
BH CV LEA LEFT ANT TIBIAL A DISTAL EDV: 13.7 CM/S
BH CV LEA LEFT ANT TIBIAL A DISTAL PSV: 89.5 CM/S
BH CV LEA LEFT ANT TIBIAL A MID PSV: 85.7 CM/S
BH CV LEA LEFT ANT TIBIAL A PROX PSV: 88.6 CM/S
BH CV LEA LEFT CFA PROX PSV: 144 CM/S
BH CV LEA LEFT DFA PROX PSV: 136 CM/S
BH CV LEA LEFT PERONEAL  DISTAL PSV: -77.1 CM/S
BH CV LEA LEFT PERONEAL  MID PSV: -114.9 CM/S
BH CV LEA LEFT PERONEAL  PROX PSV: 67.2 CM/S
BH CV LEA LEFT POPITEAL A  DISTAL PSV: -104.6 CM/S
BH CV LEA LEFT POPITEAL A  MID PSV: 247.7 CM/S
BH CV LEA LEFT POPITEAL A  PROX EDV: 16.5 CM/S
BH CV LEA LEFT POPITEAL A  PROX PSV: 162.4 CM/S
BH CV LEA LEFT PTA DISTAL EDV: -16.5 CM/S
BH CV LEA LEFT PTA DISTAL PSV: -106.6 CM/S
BH CV LEA LEFT PTA MID EDV: 13.3 CM/S
BH CV LEA LEFT PTA MID PSV: 105.8 CM/S
BH CV LEA LEFT PTA PROX EDV: 10.3 CM/S
BH CV LEA LEFT PTA PROX PSV: 62.9 CM/S
BH CV LEA LEFT SFA DISTAL PSV: -222.6 CM/S
BH CV LEA LEFT SFA MID EDV: -17.6 CM/S
BH CV LEA LEFT SFA MID PSV: -144.9 CM/S
BH CV LEA LEFT SFA PROX EDV: 20.6 CM/S
BH CV LEA LEFT SFA PROX PSV: 245.6 CM/S
BH CV LEA LEFT TIBEOPERONEAL PSV: 108 CM/S
BH CV LOWER ARTERIAL LEFT ABI RATIO: 1.05
BH CV LOWER ARTERIAL LEFT DORSALIS PEDIS SYS MAX: 116
BH CV LOWER ARTERIAL LEFT POST TIBIAL SYS MAX: 126
BH CV LOWER ARTERIAL RIGHT ABI RATIO: 1.17
BH CV LOWER ARTERIAL RIGHT DORSALIS PEDIS SYS MAX: 124
BH CV LOWER ARTERIAL RIGHT POST TIBIAL SYS MAX: 140
LEFT GROIN CFA SYS: 144.3 CM/SEC
RIGHT GROIN CFA SYS: 145 CM/SEC
UPPER ARTERIAL LEFT ARM BRACHIAL SYS MAX: NORMAL
UPPER ARTERIAL RIGHT ARM BRACHIAL SYS MAX: NORMAL

## (undated) DEVICE — SPNG LAP 18X18IN LF STRL PK/5

## (undated) DEVICE — DESTINATION PERIPHERAL GUIDING SHEATH: Brand: DESTINATION

## (undated) DEVICE — CATH GUIDE SOFTVU FLUSH HT PIG .035 5F 65CM

## (undated) DEVICE — GOWN,NON-REINFORCED,SIRUS,SET IN SLV,XXL: Brand: MEDLINE

## (undated) DEVICE — Device: Brand: OLYMPUS

## (undated) DEVICE — TIDISHIELD UROLOGY DRAIN BAGS FROSTY VINYL STERILE FITS SIEMENS UROSKOP ACCESS 20 PER CASE: Brand: TIDISHIELD

## (undated) DEVICE — ELECTRD BLD EXT EDGE/INSUL 6IN

## (undated) DEVICE — PK ORTHO MINOR 40

## (undated) DEVICE — PINNACLE INTRODUCER SHEATH: Brand: PINNACLE

## (undated) DEVICE — PATIENT RETURN ELECTRODE, SINGLE-USE, CONTACT QUALITY MONITORING, ADULT, WITH 9FT CORD, FOR PATIENTS WEIGING OVER 33LBS. (15KG): Brand: MEGADYNE

## (undated) DEVICE — Device

## (undated) DEVICE — VIOLET POLYDIOXANONE POLYMER, SYNTHETIC ABSORBABLE SUTURE CLIPS: Brand: LAPRA-TY

## (undated) DEVICE — PERCLOSE™ PROSTYLE™ SUTURE-MEDIATED CLOSURE AND REPAIR SYSTEM: Brand: PERCLOSE™ PROSTYLE™

## (undated) DEVICE — ABC HANDPIECE SINGLE FUNCTION HANDPIECE: Brand: ABC

## (undated) DEVICE — CATH ANGIO TRCN NB BCN .038 5F 65CM RIM

## (undated) DEVICE — RADIFOCUS TORQUE DEVICE MULTI-TORQUE VISE: Brand: RADIFOCUS TORQUE DEVICE

## (undated) DEVICE — ABC DISSECTING BLADE ELECTRODE, ELECTROSURGICAL ACCESSORY ELECTRODE: Brand: ABC

## (undated) DEVICE — BAG,DRAINAGE,4L,A/R TOWER,LL,SLIDE TAP: Brand: MEDLINE

## (undated) DEVICE — RADIFOCUS GLIDEWIRE ADVANTAGE GUIDEWIRE: Brand: GLIDEWIRE ADVANTAGE

## (undated) DEVICE — SUT SILK 0 TIES 30IN A306H

## (undated) DEVICE — RADIFOCUS GLIDEWIRE: Brand: GLIDEWIRE

## (undated) DEVICE — LOU TUR: Brand: MEDLINE INDUSTRIES, INC.

## (undated) DEVICE — DRAPE,CHEST,FENES,15X10,STERIL: Brand: MEDLINE

## (undated) DEVICE — CVR PROB 96IN LF STRL

## (undated) DEVICE — MAT FLR ABSORBENT LG 4FT 10 2.5FT

## (undated) DEVICE — PROXIMATE RH ROTATING HEAD SKIN STAPLERS (35 WIDE) CONTAINS 35 STAINLESS STEEL STAPLES: Brand: PROXIMATE

## (undated) DEVICE — DOVER HYDROGEL COATED LATEX FOLEY CATHETER, 30 ML, 3-WAY 24 FR/CH (8.0 MM): Brand: DOVER

## (undated) DEVICE — NAVICROSS SUPPORT CATHETER: Brand: NAVICROSS

## (undated) DEVICE — MAGNETIC DRAPE: Brand: DEVON

## (undated) DEVICE — EXTENSION SET, MALE LUER LOCK ADAPTER WITH RETRACTABLE COLLAR

## (undated) DEVICE — SYRINGE KIT,PACKAGED,,150FT,MK 7(ANGIO-ARTERION, 150ML SYR KIT W/QFT,MC)(60729385): Brand: MEDRAD® MARK 7 ARTERION DISPOSABLE SYRINGE 150 ML WITH QUICK FILL TUBE

## (undated) DEVICE — SUT SILK 2/0 SH 30IN K833H

## (undated) DEVICE — GLV SURG BIOGEL LTX PF 7 1/2

## (undated) DEVICE — APPL CHLORAPREP W/TINT 26ML ORNG

## (undated) DEVICE — GLV SURG BIOGEL LTX PF 8

## (undated) DEVICE — DRSNG WND BORDR/ADHS NONADHR/GZ LF 4X4IN STRL

## (undated) DEVICE — DRSNG WND BORDR/ADHS NONADHR/GZ LF 4X14IN STRL

## (undated) DEVICE — SUT PDS 1 XLH LP 99IN Z881G

## (undated) DEVICE — TRAP FLD MINIVAC MEGADYNE 100ML

## (undated) DEVICE — WIPE INST 3X3IN 2MM BX/20

## (undated) DEVICE — THERMOGARD PLUS ABC DUAL DISPERSIVE ELECTRODE: Brand: THERMOGARD

## (undated) DEVICE — PK PROC MAJ 40

## (undated) DEVICE — VESSEL LOOPS X-RAY DETECTABLE: Brand: DEROYAL

## (undated) DEVICE — SOL NACL 0.9PCT 1000ML

## (undated) DEVICE — GLV SURG TRIUMPH CLASSIC PF LTX 8 STRL

## (undated) DEVICE — INFLATION DEVICE: Brand: ENCORE™ 26

## (undated) DEVICE — CATH COUVALAIRE SIMPLASTIC 3WY 22F 30CC

## (undated) DEVICE — DRN WND JP RND W TROC SIL 15F 3/16IN

## (undated) DEVICE — TBG PENCL TELESCP MEGADYNE SMOKE EVAC 10FT

## (undated) DEVICE — INTENDED FOR TISSUE SEPARATION, AND OTHER PROCEDURES THAT REQUIRE A SHARP SURGICAL BLADE TO PUNCTURE OR CUT.: Brand: BARD-PARKER ® CARBON RIB-BACK BLADES

## (undated) DEVICE — DRSNG SURESITE WNDW 4X4.5

## (undated) DEVICE — TOTAL TRAY, 16FR 10ML SIL FOLEY, URN: Brand: MEDLINE

## (undated) DEVICE — GLV SURG BIOGEL LTX PF 8 1/2

## (undated) DEVICE — ST ACC MICROPUNCTURE STFF .018 ECHO/PLDM/TP 4F/10CM 21G/7CM

## (undated) DEVICE — Device: Brand: FABCO

## (undated) DEVICE — PK ANGIO 40